# Patient Record
Sex: FEMALE | NOT HISPANIC OR LATINO | Employment: OTHER | URBAN - METROPOLITAN AREA
[De-identification: names, ages, dates, MRNs, and addresses within clinical notes are randomized per-mention and may not be internally consistent; named-entity substitution may affect disease eponyms.]

---

## 2022-02-01 ENCOUNTER — TRANSFERRED RECORDS (OUTPATIENT)
Dept: HEALTH INFORMATION MANAGEMENT | Facility: CLINIC | Age: 22
End: 2022-02-01

## 2022-03-01 ENCOUNTER — MEDICAL CORRESPONDENCE (OUTPATIENT)
Dept: HEALTH INFORMATION MANAGEMENT | Facility: CLINIC | Age: 22
End: 2022-03-01

## 2022-03-01 ENCOUNTER — TRANSFERRED RECORDS (OUTPATIENT)
Dept: HEALTH INFORMATION MANAGEMENT | Facility: CLINIC | Age: 22
End: 2022-03-01

## 2022-04-06 ENCOUNTER — TRANSFERRED RECORDS (OUTPATIENT)
Dept: HEALTH INFORMATION MANAGEMENT | Facility: CLINIC | Age: 22
End: 2022-04-06

## 2022-05-03 ENCOUNTER — TRANSFERRED RECORDS (OUTPATIENT)
Dept: HEALTH INFORMATION MANAGEMENT | Facility: CLINIC | Age: 22
End: 2022-05-03

## 2022-05-29 ENCOUNTER — TRANSFERRED RECORDS (OUTPATIENT)
Dept: HEALTH INFORMATION MANAGEMENT | Facility: CLINIC | Age: 22
End: 2022-05-29

## 2023-06-05 ENCOUNTER — HOSPITAL ENCOUNTER (OUTPATIENT)
Facility: CLINIC | Age: 23
Discharge: HOME | End: 2023-06-05
Attending: OBSTETRICS & GYNECOLOGY | Admitting: OBSTETRICS & GYNECOLOGY
Payer: MEDICAID

## 2023-06-05 ENCOUNTER — HOSPITAL ENCOUNTER (OUTPATIENT)
Facility: CLINIC | Age: 23
End: 2023-06-05
Attending: OBSTETRICS & GYNECOLOGY | Admitting: OBSTETRICS & GYNECOLOGY
Payer: MEDICAID

## 2023-06-05 VITALS
RESPIRATION RATE: 18 BRPM | OXYGEN SATURATION: 99 % | HEART RATE: 87 BPM | DIASTOLIC BLOOD PRESSURE: 55 MMHG | TEMPERATURE: 98.4 F | SYSTOLIC BLOOD PRESSURE: 104 MMHG

## 2023-06-05 DIAGNOSIS — Z34.90 INTRAUTERINE PREGNANCY: Primary | ICD-10-CM

## 2023-06-05 PROBLEM — N93.9 VAGINAL BLEEDING: Status: ACTIVE | Noted: 2023-06-05

## 2023-06-05 LAB
ABO/RH(D): NORMAL
ALBUMIN UR-MCNC: 20 MG/DL
ANTIBODY SCREEN: NEGATIVE
APPEARANCE UR: CLEAR
APTT PPP: 27 SECONDS (ref 22–38)
BILIRUB UR QL STRIP: NEGATIVE
CLUE CELLS: ABNORMAL
COLOR UR AUTO: YELLOW
ERYTHROCYTE [DISTWIDTH] IN BLOOD BY AUTOMATED COUNT: 13.8 % (ref 10–15)
FIBRINOGEN PPP-MCNC: 417 MG/DL (ref 170–490)
GLUCOSE UR STRIP-MCNC: NEGATIVE MG/DL
HCT VFR BLD AUTO: 29 % (ref 35–47)
HGB BLD-MCNC: 9.9 G/DL (ref 11.7–15.7)
HGB UR QL STRIP: NEGATIVE
HOLD SPECIMEN: NORMAL
INR PPP: 0.99 (ref 0.85–1.15)
KETONES UR STRIP-MCNC: ABNORMAL MG/DL
LEUKOCYTE ESTERASE UR QL STRIP: ABNORMAL
MCH RBC QN AUTO: 30.7 PG (ref 26.5–33)
MCHC RBC AUTO-ENTMCNC: 34.1 G/DL (ref 31.5–36.5)
MCV RBC AUTO: 90 FL (ref 78–100)
MUCOUS THREADS #/AREA URNS LPF: PRESENT /LPF
NITRATE UR QL: NEGATIVE
PH UR STRIP: 6.5 [PH] (ref 5–7)
PLATELET # BLD AUTO: 222 10E3/UL (ref 150–450)
RBC # BLD AUTO: 3.22 10E6/UL (ref 3.8–5.2)
RBC URINE: 1 /HPF
SP GR UR STRIP: 1.03 (ref 1–1.03)
SPECIMEN EXPIRATION DATE: NORMAL
SQUAMOUS EPITHELIAL: 1 /HPF
TRICHOMONAS, WET PREP: ABNORMAL
UROBILINOGEN UR STRIP-MCNC: 2 MG/DL
WBC # BLD AUTO: 8.2 10E3/UL (ref 4–11)
WBC URINE: 8 /HPF
WBC'S/HIGH POWER FIELD, WET PREP: ABNORMAL
YEAST, WET PREP: ABNORMAL

## 2023-06-05 PROCEDURE — 86780 TREPONEMA PALLIDUM: CPT

## 2023-06-05 PROCEDURE — 999N000104 HC STATISTIC NO CHARGE

## 2023-06-05 PROCEDURE — 85027 COMPLETE CBC AUTOMATED: CPT

## 2023-06-05 PROCEDURE — 36415 COLL VENOUS BLD VENIPUNCTURE: CPT

## 2023-06-05 PROCEDURE — 87086 URINE CULTURE/COLONY COUNT: CPT

## 2023-06-05 PROCEDURE — 81001 URINALYSIS AUTO W/SCOPE: CPT

## 2023-06-05 PROCEDURE — 87491 CHLMYD TRACH DNA AMP PROBE: CPT

## 2023-06-05 PROCEDURE — G0463 HOSPITAL OUTPT CLINIC VISIT: HCPCS

## 2023-06-05 PROCEDURE — 86901 BLOOD TYPING SEROLOGIC RH(D): CPT

## 2023-06-05 PROCEDURE — 85384 FIBRINOGEN ACTIVITY: CPT

## 2023-06-05 PROCEDURE — 86803 HEPATITIS C AB TEST: CPT

## 2023-06-05 PROCEDURE — 86762 RUBELLA ANTIBODY: CPT

## 2023-06-05 PROCEDURE — 87389 HIV-1 AG W/HIV-1&-2 AB AG IA: CPT

## 2023-06-05 PROCEDURE — 87210 SMEAR WET MOUNT SALINE/INK: CPT

## 2023-06-05 PROCEDURE — 87653 STREP B DNA AMP PROBE: CPT

## 2023-06-05 PROCEDURE — 86706 HEP B SURFACE ANTIBODY: CPT

## 2023-06-05 PROCEDURE — 85460 HEMOGLOBIN FETAL: CPT

## 2023-06-05 PROCEDURE — 85610 PROTHROMBIN TIME: CPT

## 2023-06-05 PROCEDURE — 85730 THROMBOPLASTIN TIME PARTIAL: CPT

## 2023-06-05 PROCEDURE — 87591 N.GONORRHOEAE DNA AMP PROB: CPT

## 2023-06-05 PROCEDURE — 87340 HEPATITIS B SURFACE AG IA: CPT

## 2023-06-05 RX ORDER — PROCHLORPERAZINE MALEATE 10 MG
10 TABLET ORAL EVERY 6 HOURS PRN
Status: DISCONTINUED | OUTPATIENT
Start: 2023-06-05 | End: 2023-06-06 | Stop reason: HOSPADM

## 2023-06-05 RX ORDER — ONDANSETRON 4 MG/1
4 TABLET, ORALLY DISINTEGRATING ORAL EVERY 6 HOURS PRN
Status: DISCONTINUED | OUTPATIENT
Start: 2023-06-05 | End: 2023-06-06 | Stop reason: HOSPADM

## 2023-06-05 RX ORDER — PROCHLORPERAZINE 25 MG
25 SUPPOSITORY, RECTAL RECTAL EVERY 12 HOURS PRN
Status: DISCONTINUED | OUTPATIENT
Start: 2023-06-05 | End: 2023-06-06 | Stop reason: HOSPADM

## 2023-06-05 RX ORDER — METOCLOPRAMIDE 10 MG/1
10 TABLET ORAL EVERY 6 HOURS PRN
Status: DISCONTINUED | OUTPATIENT
Start: 2023-06-05 | End: 2023-06-06 | Stop reason: HOSPADM

## 2023-06-05 RX ORDER — ACETAMINOPHEN 325 MG/1
650 TABLET ORAL EVERY 4 HOURS PRN
Status: DISCONTINUED | OUTPATIENT
Start: 2023-06-05 | End: 2023-06-06 | Stop reason: HOSPADM

## 2023-06-05 RX ORDER — PRENATAL VIT/IRON FUM/FOLIC AC 27MG-0.8MG
1 TABLET ORAL DAILY
Qty: 90 TABLET | Refills: 3 | Status: ON HOLD | OUTPATIENT
Start: 2023-06-05 | End: 2023-10-02

## 2023-06-05 RX ORDER — ONDANSETRON 2 MG/ML
4 INJECTION INTRAMUSCULAR; INTRAVENOUS EVERY 6 HOURS PRN
Status: DISCONTINUED | OUTPATIENT
Start: 2023-06-05 | End: 2023-06-06 | Stop reason: HOSPADM

## 2023-06-05 RX ORDER — FERROUS SULFATE 325(65) MG
325 TABLET, DELAYED RELEASE (ENTERIC COATED) ORAL EVERY OTHER DAY
Qty: 90 TABLET | Refills: 3 | Status: ON HOLD | OUTPATIENT
Start: 2023-06-05 | End: 2023-10-02

## 2023-06-05 RX ORDER — ACETAMINOPHEN 325 MG/1
325-650 TABLET ORAL EVERY 6 HOURS PRN
Qty: 90 TABLET | Refills: 3 | Status: SHIPPED | OUTPATIENT
Start: 2023-06-05 | End: 2023-06-20

## 2023-06-05 RX ORDER — METOCLOPRAMIDE HYDROCHLORIDE 5 MG/ML
10 INJECTION INTRAMUSCULAR; INTRAVENOUS EVERY 6 HOURS PRN
Status: DISCONTINUED | OUTPATIENT
Start: 2023-06-05 | End: 2023-06-06 | Stop reason: HOSPADM

## 2023-06-05 ASSESSMENT — ACTIVITIES OF DAILY LIVING (ADL)
ADLS_ACUITY_SCORE: 18
ADLS_ACUITY_SCORE: 33

## 2023-06-06 ENCOUNTER — TELEPHONE (OUTPATIENT)
Dept: OBGYN | Facility: CLINIC | Age: 23
End: 2023-06-06
Payer: MEDICAID

## 2023-06-06 LAB
C TRACH DNA SPEC QL NAA+PROBE: NEGATIVE
FETAL RBC % LFV: 0 %
FETAL RBC (ML): 0 ML
GP B STREP DNA SPEC QL NAA+PROBE: NEGATIVE
HBV SURFACE AB SERPL IA-ACNC: >1000 M[IU]/ML
HBV SURFACE AB SERPL IA-ACNC: REACTIVE M[IU]/ML
HBV SURFACE AG SERPL QL IA: NONREACTIVE
HCV AB SERPL QL IA: NONREACTIVE
HIV 1+2 AB+HIV1 P24 AG SERPL QL IA: NONREACTIVE
IF INDICATED RECOMMENDED DOSE OF RH IMMUNE GLOBULIN UG: 300 UG
N GONORRHOEA DNA SPEC QL NAA+PROBE: NEGATIVE
RUBV IGG SERPL QL IA: 13 INDEX
RUBV IGG SERPL QL IA: POSITIVE
T PALLIDUM AB SER QL: NONREACTIVE

## 2023-06-06 NOTE — PLAN OF CARE
Data: [facility] at .   Reason for maternal/fetal assessment per patient is Bleeding and back pain. Patient is a . Prenatal record reviewed.      OB History    Para Term  AB Living   3 2 2 0 0 2   SAB IAB Ectopic Multiple Live Births   0 0 0 0 2      # Outcome Date GA Lbr Adrian/2nd Weight Sex Delivery Anes PTL Lv   3 Current            2 Term         CHAPARRITA   1 Term         CHAPARRITA      Medical History: History reviewed. No pertinent past medical history.. Gestational Age Unknown. VSS. Cervix: closed.  Fetal movement present. Patient denies cramping, vaginal discharge, pelvic pressure, UTI symptoms, GI problems, bloody show, edema, headache, visual disturbances, epigastric or URQ pain, abdominal pain, rupture of membranes. Support persons present.  Action: Verbal consent for EFM. Triage assessment completed.  Fetal assessment: Presumed adequate fetal oxygenation documented (see flow record). Patient instructed to report change in fetal movement, vaginal leaking of fluid or bleeding, abdominal pain, or any concerns related to the pregnancy to her nurse/physician.   Response: Dr. Lowry informed of patient arrival. Plan per provider is discharge home following normal labs resulted at this time and referral for patient to establish prenatal care and plan for full anatomy ultrasound. Prescription for iron and PNV sent to [facility] per provider. Patient verbalized understanding of education and verbalized agreement with plan. Discharged ambulatory at 2335.

## 2023-06-06 NOTE — PROGRESS NOTES
L&D Triage Progress Note    HPI: Cathleen Urbano is a 23 year old  at 23w5d by BSUS, here for vaginal bleeding and back pain.    She reports walking earlier today and noticed very small amount of vaginal bleeding. She no longer has the bleeding. Denies prior issues with this. Also had some back pain earlier today. Using heat packs for this. Has not tried any medications. Eating and drinking well. Denies contractions or LOF. Denies any trauma.     Feeling baby move well yesterday but not as much today.    Recently moved from [place]. Moving to [place]. Currently living with her  and brother in law in [place]. States she had one US done in [place] but doesn't have access to her records. Thinks she's about 6 months along based on a timeframe she was told when she was about three months along. Does not remember her due date or how it was assigned.    Not taking any medications. Reports two prior vaginal deliveries without complication. Denies any PMH.     Pregnancy notable for:  - Limited PNC    No results found for: ABO, RH, AS, HEPBANG, CHPCRT, GCPCRT, TREPAB, RPR, RUBELLAABIGG, HGB, HIV    GBS Status:   No results found for: GBS          OBHX:  OB History    Para Term  AB Living   3 2 2 0 0 2   SAB IAB Ectopic Multiple Live Births   0 0 0 0 2      # Outcome Date GA Lbr Adrian/2nd Weight Sex Delivery Anes PTL Lv   3 Current            2 Term         CHAPARRITA   1 Term         CHAPARRITA       MedicalHX:  History reviewed. No pertinent past medical history.    SurgicalHX:  History reviewed. No pertinent surgical history.    Medications:  No current facility-administered medications on file prior to encounter.  No current outpatient medications on file prior to encounter.      Allergies:  No Known Allergies    FamilyHX:    History reviewed. No pertinent family history.    SocialHX:  Social History     Socioeconomic History     Marital status:      Spouse name: None     Number of children: None      Years of education: None     Highest education level: None   Tobacco Use     Smoking status: Never     Smokeless tobacco: Never   Substance and Sexual Activity     Alcohol use: Never     Drug use: Never       ROS: 10-point ROS negative except as in HPI.    Physical Exam:  Vitals:    23   BP: 101/61   Pulse: 87   Resp: 16   Temp: 98.6  F (37  C)   TempSrc: Oral   SpO2: 99%     GEN: resting comfortably in bed, NAD   CV: Regular rate, well perfused  PULM: On room air, no increased work of breathing  ABD: soft, gravid, non-tender, non-distended  Back: Nontender to palpation, no CVA tenderness  EXT: No edema  SSE: Normal appearing external female genitalia. Normal appearing vaginal epithelium and cervix with ectropion but no other lesions. No apparent bleeding from os or evidence of trauma. No pooling. Minimal amounts of white discharge throughout the vaginal vault.   CVX: C/L/H  Presentation: Cephalic by BSUS    BSUS:   Cephalic presentation. Posterior placenta without evidence of previa. Subjectively normal amount of amniotic fluid. Biometry with BPD 6.08 cm, AC 18.87 cm, FL 4.07 cm with average gestational age of 23w5d.     NST:  FHT: baseline 140, mod variability, intermittent variable decels  TOCO: quiet    A/P: 23 year old  at 23w5d by BSUS, here for vaginal bleeding and back pain. Pregnancy notable for limited PNC.    # Vaginal bleeding  Differential includes: Labor (cervical change), Placental Abruption, Trauma/Ectropion, Cervicitis, Placenta/Vasa Previa. No evidence of previa on BSUS but comprehensive ultrasound recommended for complete evaluation. Cervix nonlabored, so PTL unlikely. Given no abdominal pain or trauma, unlikely to be due to abruption but laboratory workup ordered for completeness. Infectious labs for cervicitis collected. No evidence of trauma. Most likely due to ectropion. Continue to monitor and return precautions provided. Follow up labs and treat PRN.   - Cervix: C/L/H  -  Membranes: Intact  - Labs: UA reflex UC, GC/C, Wet Prep, GBS, coags, KB  - Encourage oral hydration    # Anemia  - Hgb 9.9, no baseline available  - Recommend iron supplementation for now  - Further workup deferred to outpatient setting    # Back pain  No signs of PTL currently and patient states it does not feel like contractions. No CVA tenderness and pain mainly in lower back.  - Recommend symptomatic relief with hot packs and tylenol PRN  - Consider PT referral in future if persistent    # Fetal Well-Being  - NST reassuring, appropriate for gestational age  - Fetal movements seen on BSUS    # PNC  - Rh positive, GBS collected  - Imaging as above on BSUS  - PNL collected today  - Recommend PNV, prescription sent in    Dispo: To home w/ strict return precautions. Follow up recommended and message sent to RN pool for scheduling as well as comprehensive US.    Patient discussed with Dr. Valentine.    Cuate Lowry MD, MPH  Ob/Gyn Resident, PGY-2  06/05/23 11:47 PM     The patient was reviewed with Dr. Lowry.  I agree with the above assessment and plan of care.     Dalia Valentine MD, FACOG

## 2023-06-06 NOTE — DISCHARGE INSTRUCTIONS
Discharge Instruction for Undelivered Patients      You were seen for: Bleeding Assessment and Fetal Assessment  We Consulted: Dr. Lowry   You had (Test or Medicine):EFM, Labs for CBC, Hep B, Hep C, HIV, GC/Chlam, wet prep, GBS, Urinalysis and urine culture     Diet:   Drink 8 to 12 glasses of liquids (milk, juice, water) every day.  You may eat meals and snacks.     Activity:  Call your doctor or nurse midwife if your baby is moving less than usual.     Call your provider if you notice:  Swelling in your face or increased swelling in your hands or legs.  Headaches that are not relieved by Tylenol (acetaminophen).  Changes in your vision (blurring: seeing spots or stars.)  Nausea (sick to your stomach) and vomiting (throwing up).   Weight gain of 5 pounds or more per week.  Heartburn that doesn't go away.  Signs of bladder infection: pain when you urinate (use the toilet), need to go more often and more urgently.  The bag of cotto (rupture of membranes) breaks, or you notice leaking in your underwear.  Bright red blood in your underwear.  Abdominal (lower belly) or stomach pain.  Second (plus) baby: Contractions (tightening) less than 10 minutes apart and getting stronger.  *If less than 34 weeks: Contractions (tightening) more than 6 times in one hour.  Increase or change in vaginal discharge (note the color and amount)      Follow-up:  Establish care for regular OB visits with [facility] at [phone]             Detail Level: Detailed

## 2023-06-07 ENCOUNTER — TELEPHONE (OUTPATIENT)
Dept: OBGYN | Facility: CLINIC | Age: 23
End: 2023-06-07
Payer: MEDICAID

## 2023-06-07 DIAGNOSIS — Z34.90 INTRAUTERINE PREGNANCY: Primary | ICD-10-CM

## 2023-06-07 DIAGNOSIS — Z32.01 PREGNANCY TEST POSITIVE: ICD-10-CM

## 2023-06-07 LAB — BACTERIA UR CULT: NORMAL

## 2023-06-07 NOTE — TELEPHONE ENCOUNTER
[facility] Call Center    Phone Message    May a detailed message be left on voicemail: yes     Reason for Call: Order(s): Other:   Reason for requested: initial OB US ( about 6-7 months) per spouse  Date needed: 6/12/23  Provider name: Tova Lagos CNM      Action Taken: Other: whs    Travel Screening: Not Applicable

## 2023-06-12 ENCOUNTER — ANCILLARY PROCEDURE (OUTPATIENT)
Dept: ULTRASOUND IMAGING | Facility: CLINIC | Age: 23
End: 2023-06-12
Attending: ADVANCED PRACTICE MIDWIFE
Payer: MEDICAID

## 2023-06-12 DIAGNOSIS — Z32.01 PREGNANCY TEST POSITIVE: ICD-10-CM

## 2023-06-12 PROCEDURE — 76816 OB US FOLLOW-UP PER FETUS: CPT | Mod: 26 | Performed by: STUDENT IN AN ORGANIZED HEALTH CARE EDUCATION/TRAINING PROGRAM

## 2023-06-12 PROCEDURE — 76816 OB US FOLLOW-UP PER FETUS: CPT

## 2023-06-20 ENCOUNTER — PRENATAL OFFICE VISIT (OUTPATIENT)
Dept: OBGYN | Facility: CLINIC | Age: 23
End: 2023-06-20
Payer: MEDICAID

## 2023-06-20 VITALS — SYSTOLIC BLOOD PRESSURE: 97 MMHG | DIASTOLIC BLOOD PRESSURE: 63 MMHG | HEART RATE: 93 BPM | WEIGHT: 137 LBS

## 2023-06-20 DIAGNOSIS — Z34.90 INTRAUTERINE PREGNANCY: Primary | ICD-10-CM

## 2023-06-20 NOTE — PATIENT INSTRUCTIONS
Vitamin B6 (pyridoxine) Oral Tablet  Uses  This medicine is used for the following purposes:    metabolism disorder    nausea and vomiting    vitamin deficiency  Instructions  This medicine may be taken with or without food.  Store at room temperature away from heat, light, and moisture. Do not keep in the bathroom.  If you forget to take a dose on time, take it as soon as you remember. If it is almost time for the next dose, do not take the missed dose. Return to your normal dosing schedule. Do not take 2 doses of this medicine at one time.  Drug interactions can change how medicines work or increase risk for side effects. Tell your health care providers about all medicines taken. Include prescription and over-the-counter medicines, vitamins, and herbal medicines. Speak with your doctor or pharmacist before starting or stopping any medicine.  Speak with your doctor or pharmacist before starting any other vitamins.  It is very important that you follow your doctor's instructions for all blood tests.  Cautions  Tell your doctor and pharmacist if you ever had an allergic reaction to a medicine.  Do not use the medication any more than instructed.  Tell the doctor or pharmacist if you are pregnant, planning to be pregnant, or breastfeeding.  Side Effects  If you have any of the following side effects, you may be getting too much medicine. Please contact your doctor to let them know about these side effects.    drowsiness or sedation    numbness or tingling in hands and feet    headaches    nausea    stomach upset or abdominal pain  This medicine usually has no side effects.  A few people may have an allergic reaction to this medicine. Symptoms can include difficulty breathing, skin rash, itching, swelling, or severe dizziness. If you notice any of these symptoms, seek medical help quickly.  Extra  Please speak with your doctor, nurse, or pharmacist if you have any questions about this  medicine.  [email or web address]  IMPORTANT NOTE: This document tells you briefly how to take your medicine, but it does not tell you all there is to know about it. Your doctor or pharmacist may give you other documents about your medicine. Please talk to them if you have any questions. Always follow their advice. There is a more complete description of this medicine available in English. Scan this code on your smartphone or tablet or use the web address below. You can also ask your pharmacist for a printout. If you have any questions, please ask your pharmacist. The display and use of this drug information is subject to Terms of Use. Copyright(c) 2022 [employer]     9542-7528 [employer]. All rights reserved. This information is not intended as a substitute for professional medical care. Always follow your healthcare professional's instructions.          Pregnancy: Common Questions  There are plenty of myths and  old wives  tales  about pregnancy. You may need help  fact from fiction. On this sheet, you ll find answers to a few common questions. If you have other questions, talk with your healthcare provider.    Will working harm my baby?  In most cases, working throughout your pregnancy is not harmful at all. There may be concerns if the job involves dangerous machinery or chemicals, lifting, or standing for very long periods of time. Talk with your healthcare provider and employer about your particular job and pregnancy.  Is it safe to have sex during pregnancy?  Yes, unless you are specifically advised not to by your healthcare provider.  Why can t I change the cat litter box?  Cats carry a disease called toxoplasmosis. In adult humans, it shows up as a mild infection of the blood and organs. If you are infected during pregnancy, the baby s brain and eyes could be damaged. To be safe, have someone else change the litter. If you must handle it, wear a paper  mask over your nose and mouth. Also, wear gloves and wash your hands afterward.  Which medicines are safe?  No prescription or over-the-counter medicine is safe for everyone all of the time. But sometimes medicines are needed. Be sure your healthcare provider knows you are pregnant. Then use only the medicines he or she advises you to take.  Is it true that I can overheat my baby?  Yes. To prevent making your baby too warm:    Don t sit in a Jacuzzi. A long, warm bath is fine, but not in water over 100 F (37.7 C).    Exercise less intensely if you feel tired. Base your workout on how you feel, not your heart rate. Heart rates aren t a good way to measure effort during pregnancy.  Can I lift and carry safely?  Yes, if your healthcare provider doesn t tell you otherwise. Learn to lift and carry safely to prevent injury and reduce back pain during pregnancy. To protect your back:    Bend at the knees to bring the load nearer.    Get a good . Test the weight of the load.    Tighten your belly. Exhale as you lift.    Lift with your legs, not with your back.    Carry the load close to your body.    Hold the load so you can see where you are going.  What if I get sick?  Most women get sick at least once during pregnancy. Talk with your healthcare provider if you do. Most likely it will not affect your pregnancy. Get plenty of rest and fluids, and eat what you can. Talk with your provider before taking any medicines.  [employer] last reviewed this educational content on 8/1/2020 2000-2022 [employer]. All rights reserved. This information is not intended as a substitute for professional medical care. Always follow your healthcare professional's instructions.          Comfort Tips During Pregnancy  Pregnancy can bring discomfort of different kinds. Below are tips for ways to feel better. Talk with your healthcare provider before using pain-relieving medicine at any time during your pregnancy.    First  trimester tips  Easing nausea    Get up slowly. Eat a few unsalted crackers before you get out of bed.    Avoid smells that bother you.    Eat small, bland, low-fat, high-protein meals at frequent intervals.    Sip on water, weak tea, or clear soft drinks, like ginger ale. Eat ice chips.    Try taking vitamin B6.  Coping with fatigue    Take catnaps when you can.    Get regular exercise.    Accept help from others.    Practice good sleep habits, like going to bed and getting up at the same time each day. Use your bed only for sleep and sex.  Calming mood swings    Talk about your feelings with others, including other mothers.    Limit sugar, chocolate, and caffeine.    Eat a healthy diet. Don t skip meals.    Get regular exercise.  Soothing headaches    Get fresh air and exercise.    Relax and get enough rest.    Check with your healthcare provider before taking any pain medicines.    Second trimester tips    To limit ankle swelling, sit with your feet raised or wear support hose.    If you have pain in your groin and stomach (round ligament pain), don't make sudden twisting movements with your body.    For leg cramps, flexing your foot often brings immediate relief. Also try massaging your calf in long, downward strokes, or stretching your legs before going to bed. Get enough exercise and wear shoes with flexible soles. Eat foods rich in calcium.    Third trimester tips  Reducing heartburn    Eat small, light meals throughout the day rather than 3 large ones.    Sleep with your upper body raised 6 inches. Don t lie down until 2 hours after you eat.    Don't eat greasy, fried, or spicy foods.    Don't have citrus fruits or juices.  Treating constipation    Eat foods high in fiber, such as whole-grain foods, and fresh fruit and vegetables).    Drink plenty of water.    Get regular exercise.    Ask about your healthcare provider about medicines that have docusate or psyllium.  Taking care of your breasts    Don't use  harsh soaps or alcohol, which can make your skin too dry.    Wear nursing bras. They provide more support than regular bras and can be used after pregnancy if you breastfeed.  Getting a good night s sleep    Take a warm shower before bed.    Sleep on a firm mattress.    Lie on your side with 1 leg crossed over the other.    Use pillows to support your arms, legs, and belly.    [employer] last reviewed this educational content on 8/1/2020 2000-2022 [employer]. All rights reserved. This information is not intended as a substitute for professional medical care. Always follow your healthcare professional's instructions.          Folic Acid Supplements  Folic acid is also called folate. It is one of the B vitamins. Nutrition experts are just starting to learn more about how folic acid helps the body. It is needed to prevent a shortage of red blood cells (a type of anemia). Experts have also found that folic acid can prevent some birth defects.     How much folic acid do you need?  Adults should have 400 mcg (micrograms) of folic acid each day. Adults may need more if they are planning to get pregnant, are pregnant, or are breastfeeding. Talk with your healthcare provider to find the right amount of folic acid for you.   Why use a supplement?  Taking folic acid both before and during pregnancy is important. This can prevent birth defects of the spine and brain (neural tube defects). A supplement may also be helpful if you drink alcohol often. You may want to use a folic acid supplement if any of the following is true for you:   ? I am a person of childbearing age.   ? I am planning to get pregnant.  ? I rarely eat leafy green vegetables, dried beans, or lentils.   ? I rarely eat cereal and whole grains (wheat germ, brown rice, whole-wheat bread).  ? I often have more than 1 drink of alcohol a day.   If you take folic acid  Here are some tips to help you get the most from a folic acid supplement:    Read  the label to be sure the product won't  soon.    Choose a supplement that provides 400 to 800 mcg of folic acid.    Store the supplement in a cool, dry place, away from sun and heat.    Eat a healthy diet to get all the nutrients your body needs.  Foods that have folic acid  Folic acid is found mainly in plants. Some good sources include:    Dark green leafy vegetables such as spinach, kale, collards, and braeden lettuce    Lentils, chickpeas, and dried beans such as koenig, kidney, and black beans    Asparagus, bok keyona, broad-beans, broccoli, and Martell sprouts    Avocados, oranges, and orange juice    Wheat germ and whole-wheat products    Products fortified with folic acid, such as cereal, pasta, bread, and rice  [employer] last reviewed this educational content on 2022-2022 [employer]. All rights reserved. This information is not intended as a substitute for professional medical care. Always follow your healthcare professional's instructions.          Anemia During Pregnancy  Anemia is a condition in which the red blood cell count is too low. In pregnant women, this is often caused by not having enough iron in the blood. Anemia is common in pregnancy and very easy to treat.   Why you need iron   While pregnant, your body uses iron to make red blood cells for you and your baby. These cells bring oxygen to your baby and to the rest of your body. Not having enough red blood cells can cause your baby to be born too small. But this is rare, as it s easy for you to get enough iron.   Testing for anemia   The only way to know if you have anemia is to have a simple test called a CBC (complete blood count). This is a routine test that will be done at one of your first prenatal visits. This test may be done again, at about week 26 to week 28.   Treating anemia   If you have anemia due to low iron content, follow the advice of your healthcare provider. Eating foods high in iron and taking  supplements can help you get the iron you need.   Eating foods high in iron      Green leafy vegetables and nuts are a good source of iron.     Eat foods that are high in iron such as:     Red meat (limit organ meats such as liver)    Seafood (be sure it s fully cooked), and don't eat fish that are high in mercury, such as swordfish, tilefish, annie mackerel, and shark    Tofu    Eggs    Green, leafy vegetables    Whole grains and iron-fortified cereals    Dried fruits and nuts  Taking iron supplements   In most cases, a prenatal vitamin can provide enough iron. But if you need more, your healthcare provider may prescribe an iron supplement. Swallow iron pills with a glass of orange or cranberry juice. The vitamin C in these fruit juices can help your body absorb iron. But don t take your iron pills with juices that have calcium added to them. They can keep your body from absorbing the iron.   Iron supplements   Iron supplements may have certain side effects. They may cause your stools to turn black, and make you feel sick to your stomach or constipated. Here are some tips that may help you limit side effects:     Start slowly. Take 1 pill a day for a few days. Then work up to your prescribed dose over time.    Take your pills with meals, and not at bedtime.    Increase the fiber in your diet. Eat more whole grains, fruits, and vegetables.    Do mild exercise each day.    If advised by your healthcare provider, take a stool softener.  [employer] last reviewed this educational content on 2/1/2020 2000-2022 [employer]. All rights reserved. This information is not intended as a substitute for professional medical care. Always follow your healthcare professional's instructions.          Rh-Negative Screening  If you have Rh-negative blood, your baby may be at risk for health problems. This is true only if your baby has Rh-positive blood. A simple test followed by treatment can help prevent problems.    What are the risks?  If the blood of your baby is Rh positive, your Rh-negative blood may form antibodies. These antibodies will attack the Rh-positive blood. This is called Rh disease. Rh disease can cause your baby to lose blood cells or have other health problems. Medical treatment can prevent Rh disease by keeping antibodies from forming.   How are you tested?  A simple blood test shows if you re Rh negative. This test is done very early in your pregnancy. If you re Rh negative, you ll have a second blood test near week 28 of pregnancy. This test will check whether or not your blood contains Rh antibodies.     [employer] last reviewed this educational content on 4/1/2020 2000-2022 [employer]. All rights reserved. This information is not intended as a substitute for professional medical care. Always follow your healthcare professional's instructions.          What Is Prenatal Care?  Before getting pregnant, you may have added some good health habits to get ready for your baby. But if you didn t, start today. One of the first steps is learning how to take care of yourself. See your healthcare provider as soon as you think you may be pregnant. Then continue prenatal care during your pregnancy.     Prenatal care helps you have a healthy baby   During prenatal care:    Your healthcare provider checks the health of your pregnancy. They'll calculate a due date. This gives an estimate of your baby's delivery. Many people give birth between 38 and 41 weeks of pregnancy. Your due date is found by counting 40 weeks from the first day of your last menstrual period.    Your pregnancy's progress is checked. This includes your baby s growth, fetal heart rate, changes in your weight and blood pressure, and your overall health and comfort.    Your provider may find new concerns and manage current ones before problems happen.    Your provider will check lab work through blood and urine.    Your provider will  talk about normal changes that happen during pregnancy. They'll also talk about changes that may not be normal. And they'll advise you about lifestyle changes.    Your provider will answer your questions. They'll also help you get ready for labor and delivery.  You're part of a team  When you re pregnant, you re part of a team. This team includes you, your baby, and your provider. It also may include a partner or a main support person. That could be a loved one, such as a spouse, a family member, or a friend. As you work to give your baby a healthy start, rely on your team members for support.   It s not too late to start good habits   What matters most is protecting your baby from this moment on. If you smoke, drink alcohol, or use illegal drugs, now's the time to stop. If you need help, talk with your healthcare provider.     Smoking increases the risk of losing your baby. Or of having a low-birth-weight baby. If you smoke, quit now.    Alcohol and drugs have been linked with many problems. These include miscarriage, birth defects, intellectual disability, and low birth weight. Stay away from alcohol and drugs.    Eat a healthy diet. This helps keep you and your baby strong and healthy. Follow your provider's instructions for nutrition. Also stay within the guidelines you're given for healthy weight gain.    Take 400 micrograms to 800 micrograms (400 mcg to 800 mcg or 0.4 mg to 0.8 mg) of folic acid every day. Take it for at least 1 month before getting pregnant. And keep taking it for the first trimester of your pregnancy. This is to lower your risk of some brain and spinal birth defects. You can get folic acid from some foods. But it's hard to get all the folic acid you'll need from foods alone. Talk with your provider about taking a folic acid supplement.    Regular exercise will help you stay fit and feel good during pregnancy. It can also help prevent or reduce back pain. Talk with your provider about how to  exercise safely during pregnancy.    If you have a health condition, make sure it's under control. Some conditions include asthma, diabetes, depression, high blood pressure, obesity, thyroid disease, or epilepsy. Be sure your vaccines are up to date.  How daily issues affect your health  Many things in your daily life impact your health. This can include transportation, money problems, housing, access to food, and . If you can t get to medical appointments, you may not receive the care you need. When money is tight, it may be difficult to pay for medicines. And living far from a grocery store can make it hard to buy healthy food.   If you have concerns in any of these or other areas, talk with your healthcare team. They may know of local resources to assist you. Or they may have a staff person who can help.   [email or web address] last reviewed this educational content on 8/1/2020 2000-2022 [employer]. All rights reserved. This information is not intended as a substitute for professional medical care. Always follow your healthcare professional's instructions.          Understanding Intimate Partner Violence  Intimate partner violence (IPV) affects hundreds of thousands of women. But the true number may be much higher because many women may not report it. Partner violence often starts or gets worse during and after pregnancy. This may be from the stress of pregnancy and a new baby. IPV can result in pregnancy complications, poor postpartum care, and poor health of the baby.  You may feel alone if you are experiencing intimate partner violence during or after your pregnancy, but know that you re not. It s far more common than you think. And there s help and hope. Talk with your healthcare provider if you are not safe.  Types of intimate partner violence  Most people think of IPV as just physical abuse. While it does often include physical abuse, IPV can be emotional and sexual abuse. These other forms  of abuse are sometimes harder to see. This is especially true for emotional abuse, because it can distort your own viewpoint.    Physical abuse includes using physical force to hurt or disable a partner. It can include throwing objects, pushing, kicking, biting, slapping, strangling, hitting, or beating.    Emotional abuse includes making threats, and controlling money or other resources. It s anything that erodes a person s sense of self-worth. It may look like name-calling, blaming, and stalking. It can also include isolation from friends, family, and even access to healthcare.    Sexual violence includes rape, unwanted kissing, and forced touching. It also includes reproductive coercion. This is holding power and control over the woman s reproductive health. It may look like efforts to sabotage contraception, having unsafe sex to purposefully expose the woman to sexually transmitted infections (STIs). It may also include forced  or injuries to cause a miscarriage.  Are you at risk for IPV?  IPV affects all genders, races, ethnicities, and social and economic statuses. But some people are at greater risk for being a victim or an abuser. Certain factors can increase the risk for IPV.  Individual factors    Having low self-esteem, being emotional dependent, insecure, and jealous    Having low income or being unemployed, having recent job loss or job instability    Being younger    Using alcohol or drugs    Being depressed, angry, having PTSD, or being hostile towards women    Having a history of abusing others, being abused, or witnessing abuse    Having poor problem solving skills    Having poor impulse control    Being a Native , , or  woman  Relationship factors    Marital or relationship conflict, frequent fighting    Having a jealous or possessive partner    Having control issues    Being under economic stress    Having poor social support    Having income  "inequality    It often helps to ask yourself these questions from the  Dim to know if you are in an abusive relationship:    Does my partner always put me down and make me feel bad about myself?    Has my partner caused harm or pain to my body?    Does my partner threaten me, the baby, my other children or himself?    Does my partner blame me for his actions? Does he tell me it's my own fault he hit me?    Is my partner becoming more violent as time goes on?    Has my partner promised never to hurt me again, but still does?  If you answered \"yes\" to any of these questions, you may be in an unhealthy relationship.  If you recognize yourself or your partner in any of these descriptors, talk with your healthcare provider to get help. If you are in danger, he or she can help you develop a safety plan.  Health effects  IPV during or after pregnancy can cause physical and emotional health effects, pregnancy complications, poor outcomes, and injury and harm to the baby after birth.  During pregnancy    Physical injuries such as bruises, cuts, or broken bones    Vaginal bleeding or pelvic pain    Early labor and delivery    Tearing of the placenta (placental abruption)    Maternal death  Infant health    Low infant birth weight    Infant who needs NICU care    Broken bones    Death of   After birth  After birth, the mother may:    Have pain and other long-term health conditions    Develop postpartum depression (2 to 3 times greater risk)    Have high-risk sexual behaviors    Use harmful substances    Have unhealthy diet and lifestyle such as eating disorders  Abuse is never OK. One in 6 abused women are first abused during pregnancy, when it s dangerous to both you and your developing baby. Recognizing that you are in an abusive relationship is the first step to help. See your healthcare provider or someone else you trust who can help you develop a safety plan.  What to expect from your healthcare " provider  It can be a hard to bring up partner violence with your healthcare provider. But it s an important first step in getting help. Rest assured, your conversation is confidential. He or she is a non-judgmental health professional. He or she likely has other patients with similar problems and recognizes the difficulty of the situation. Your provider may ask you questions such as:    Do you feel safe in your relationship?    Do you have a safe place to go in an emergency?    Do conflicts ever turn into physical fights?  Answer honestly and completely. There will be no pressure to disclose the abuse or to press charges. He or she won t ask about the abuse in front of a partner, friends, or family. The goal is to help you access help when you are ready.  Call   If you feel your safety is in jeopardy now, call or the police.  For more help  If the person who hurt you is your partner or spouse and your situation can become dangerous again, it's vital to make a safety plan. The National Domestic Violence Hotline can help you develop a plan that meets your personal situation.    National Domestic Violence Hotline , www.mobilePeople.Linq3, 231.677.7274  [employer] last reviewed this educational content on 5/1/2020 2000-2022 [employer]. All rights reserved. This information is not intended as a substitute for professional medical care. Always follow your healthcare professional's instructions.          Healthy Eating Habits During Pregnancy  It s important to develop healthy eating habits while you are pregnant, for you as well as for your baby. Here are some ways to stay healthy.   Aim for a healthy weight  A slow, steady rate of weight gain is often best. After the first trimester, you may gain about a pound a week. If you were overweight before pregnancy, you need to gain fewer pounds. Your healthcare provider can give you a healthy weight goal for your pregnancy.   Don t diet  Now is not the time to  diet. You may not get enough of the nutrients you and your baby need. Instead, learn how to be a healthy eater. Start by doing it for your baby. Soon, you may do it for yourself.   Vitamins and supplements  Talk with your healthcare provider about taking these and other prenatal vitamins and supplements.     Iron makes the extra blood you need now.    Calcium and vitamin D help build and keep strong bones.    Folic acid helps prevent certain birth defects.    Iodine helps the thyroid work right.    Some vitamins may not be safe to take. Your healthcare provider will tell you which ones to avoid.  Fluids    Drink at least 8 to 10 cups of fluid daily. Your baby needs fluids. Fluids also decrease constipation, flush out toxins and waste, limit swelling, and help prevent bladder infections. Water is best. Other good choices are:     Water or seltzer water with a slice of lemon or lime. (These can also help ease an upset stomach.)    Clear soups that are low in salt    Low-fat or fat-free milk, soy or rice milk with calcium added    Popsicles or gelatin  Things to avoid  Some things might harm your growing baby. Don t eat or drink:     Alcohol    Unpasteurized dairy foods and juices    Raw or undercooked meat, poultry, fish, or eggs    Unwashed fruits and vegetables    Prepared meats, like deli meats or hot dogs, unless heated until steaming hot    Fish that are high in mercury, like shark, swordfish, annie mackerel, tilefish, and albacore tuna  Things to limit  Ask your healthcare provider whether it s safe to eat or drink:     Caffeine    Artificial sweeteners    Organ meats    Certain types of fish    Fish and shellfish that contain mercury in lower amounts, like shrimp, canned light tuna, salmon, pollock, and catfish  [employer] last reviewed this educational content on 7/1/2021 2000-2022 [employer]. All rights reserved. This information is not intended as a substitute for professional medical care.  Always follow your healthcare professional's instructions.          Pregnancy: Planning Your Exercise Routine  While you re pregnant, an exercise routine helps both your mind and your body feel good. It tones your muscles and makes them stronger. It also gives you and your baby more oxygen.   The right exercise for you    Overall conditioning is best for you and your baby. Try walking, swimming, or riding a stationary bike. Always warm up, cool down, and drink enough fluids. Keep a snack close by in case your blood sugar gets low. Discuss exercise choices with your healthcare provider. Talk about the following:     If you already exercise, find out how to adapt your routine while you re pregnant. Keep the intensity of the exercise moderate. As your pregnancy progresses, your center of gravity will change. Be careful to keep your balance.    Ask if there are any local prenatal exercise classes, such as yoga or water aerobics. Find out which prenatal exercise videos are good choices.    If you were not exercising before your pregnancy, find out the best way to start. Now is not the time to begin a new workout on your own. Start slowly. Listen to your body.    Ask which forms of exercise you should avoid. These may include risky activities like hot yoga, horseback riding, scuba diving, skiing, skating, and contact sports.  Pelvic tilts  These help strengthen your stomach muscles and low back. You can do pelvic tilts instead of sit-ups.     Do this exercise on your hands and knees.    Relax the back of your neck. Pull your stomach in until your low back flattens.    Hold for 30 seconds. Release. Repeat 10 times. Do this twice a day.  Kegel exercises  Kegel exercises strengthen the pelvic muscles. Doing Kegels daily helps prepare these muscles for delivery. Kegels also help ease your recovery. You exercise these muscles by tightening, holding, then relaxing them. To do 1 type of Kegel exercise, contract as if you were  stopping your urine stream (but do it when you re not urinating). Hold for 10 seconds, then repeat 10 times, a few times a day.   Tips to add activity  Here are some tips to follow:    Park the car farther from a store and walk.    If you can, do errands on foot instead of driving.    Walk across the office to talk to someone in person instead of calling.    While waiting for appointments, go up and down stairs or around the block.  Tips to stay active  Here are some tips to follow:    Maintain your routine. But exercise less intensely if you feel tired.    Base your workout on how you feel, not your heart rate. Heart rates aren t a good way to measure effort during pregnancy.    Don't exercise on your back after week 16.  What are the warning signs that I should stop exercising?  Stop exercising and call your healthcare provider if you have any of these symptoms:    Vaginal bleeding     Dizziness or feeling faint     Increased shortness of breath     Chest pain     Headache     Muscle weakness     Calf (back of the leg) pain or swelling      Uterine contractions or  labor     Decreased fetal movement     Fluid leaking (or gushing) from your vagina  [employer] last reviewed this educational content on 2021-2022 [employer]. All rights reserved. This information is not intended as a substitute for professional medical care. Always follow your healthcare professional's instructions.          Nutrition During Pregnancy   Having a healthy baby depends mostly on you. What you eat matters to your baby and your health. During pregnancy, you will likely need about 300 more calories per day than before you became pregnant. Each day, try to eat the number of servings listed here for each food group. In addition, cut down on salt and caffeine. Limit the amount of sweets and high-fat foods you eat. Don t smoke or drink alcohol.     Important: See your healthcare provider as often as requested. If  you have any questions, be sure to ask them.   Fruits Vegetables Grains & Cereals* Fats & Oils   2 cups  Examples of 1-cup servings:   1 medium apple  1 medium orange  1 medium banana  1 cup chopped fruit  1 cup 100% fruit juice (pasteurized)   1/2 cup dried fruit 2-1/2 to 3 cups   Examples of 1 servin cups raw, leafy greens   1 cup raw or cooked cut-up vegetables   1 cup 100% vegetable juice (pasteurized)  6 to 8 ounces  Examples of 1-ounce servings:   1 slice bread  1/2 cup cooked rice  1/2 cup cooked cereal   1/2 cup pasta  1 ounce cold cereal 6 to 8 teaspoons   Dairy** Protein--- Fluids      3 cups  Examples of 1-cup servings:   1 cup milk  1 cup yogurt  1-1/2 ounces natural cheese   2 ounces processed cheese  5 to 6-1/2 ounces  Examples of 1-ounce servings:   1 egg  1 ounce of lean meat, poultry, or fish   1/4 cup cooked beans   1 tablespoon peanut butter   1/2 ounce nuts 8 or more 8-ounce glasses   Examples:  Water  Mineral water  Clear soups, broth      *Note: Choose whole grains whenever possible.   ** Note: Try to choose low-fat options; stay away from soft cheeses and unpasteurized milk.   --- Notes: Don't eat raw or undercooked meats, eggs, seafood, fish, and shellfish. Also, some types of fish, such as shark, swordfish, and annie mackerel, should not be eaten during pregnancy. Don't eat hot dogs, lunch meats, or cold cuts unless heated to steaming just before being served. Ask your healthcare provider about safe choices.   Prenatal supplements  A prenatal supplement is a pill that you take daily during pregnancy. It helps make sure you re getting the right amount of certain nutrients that are important to your baby. Ask your healthcare provider to help you choose the best one for you. Important nutrients during pregnancy include:     Folic acid. It's best to start taking this supplement 1 month before you start trying to get pregnant. Folic acid helps prevent certain problems in your baby. During  pregnancy, you need to take 400 micrograms (mcg) of folic acid every day for the first 2 to 3 months after conception. After that, 600 mcg is needed for a growing baby and placenta.    Iron, calcium, and vitamin D. You may also be advised to take these supplements during pregnancy. They help keep you and your baby healthy. Take them at different times because calcium makes it hard for the body to absorb iron. Taking iron with orange juice helps to increase its absorption.  [employer] last reviewed this educational content on 8/1/2020 2000-2022 [employer]. All rights reserved. This information is not intended as a substitute for professional medical care. Always follow your healthcare professional's instructions.          Pregnancy: Your Weight  Being a healthy weight is important for both you and your baby. The weight you gain now is not just extra fat. It is also the weight of your baby. And it is the increased blood and fluids to support the baby. A slow, steady rate of gain is best. How much you should gain depends on your weight before getting pregnant. Check with your healthcare provider to find out what is right for you.      Your weight will be checked regularly by your healthcare provider.     Talk to your healthcare provider if you have any questions.   If you gain too much  Gaining too much weight might cause you to feel tired, or you could have a harder pregnancy or birth. If you and your healthcare provider decide you re gaining too much:     Eat fewer fats and sugars. Instead, eat fruit, vegetables, and whole-grain foods.    Drink plenty of water between meals.    Get at least 20 minutes of light exercise, like walking, each day.    Don t diet. You might not get enough of the nutrients you and your baby need.    Keep a food diary to help you gauge what and how much you are eating.  If you're not gaining enough  If you don t gain enough, your baby could be too small or have health  problems. Women tend to gain most of their weight in the second and third trimesters. For now:     Eat many types of foods. Make sure you get enough calcium, protein, and carbohydrates.    Don t skip meals.    Eat healthy snacks.    Pick nutrient-dense, high-calorie healthy food like trail mix or protein shakes.    See a dietitian for help.    Talk to your healthcare provider if you have had an eating disorder or problems with certain foods.  The following are ways to get more calories:    Eat breakfast every day. Peanut butter or a slice of cheese on toast can give you an extra protein boost.    Snack between meals. Yogurt and dried fruits can provide protein, calcium, and minerals.    Try to eat more foods that are high in good fats, like nuts, fatty fish, avocados, and olive oil.    Drink juices made from real fruit that are high in vitamin C or beta-carotene, like grapefruit juice, orange juice, papaya nectar, apricot nectar, and carrot juice.    Don't eat junk food, like foods high in sugar.  [employer] last reviewed this educational content on 7/1/2021 2000-2022 [employer]. All rights reserved. This information is not intended as a substitute for professional medical care. Always follow your healthcare professional's instructions.        You have been provided the CDC Warning Signs in Pregnancy document.    Additional copies can be found here: www.fvfiles.[email or web address].pdf    Pregnancy: Your Second Trimester Changes  Each day, you and your baby are changing and growing together. Here s a quick look at what s happening to both of you.  How you are changing  Even when you don t notice it, your body is adapting to meet the needs of your growing baby. The changes in your body might also affect your moods.  Your body  Your uterus expands as your baby grows. As the weeks go by, you will feel more pressure on your bladder, stomach, and other organs. You may notice some skin color changes on your  forehead, nose, or cheeks. Freckles may darken, and moles may grow. You may notice a darker line on your abdomen between your belly button and pubic bone in the midline.  Your moods  The second trimester is often easier than the first. Still, be prepared for mood swings. These are from the increase in hormones made by your body. Hormones are chemicals that affect the way organs work. These mood swings are a normal part of pregnancy.  How your baby is growing    Month 4  Your baby s heartbeat may be heard with a Doppler (handheld ultrasound device) by 9 to 10 weeks. Eyebrows, eyelashes, and fingernails begin to form.    Month 5  You may feel your baby move. After a growth spurt, your baby nears 10 inches.    Month 6  Your baby s fingerprints have formed. Your baby weighs about 1 to 2 pounds and is about 12 inches long.    [employer] last reviewed this educational content on 7/1/2021 2000-2022 [employer]. All rights reserved. This information is not intended as a substitute for professional medical care. Always follow your healthcare professional's instructions.          Adapting to Pregnancy: Second Trimester  Keep up the healthy habits you started in your first trimester. You might be a little more tired than normal. So plan your day wisely. Look at the tips below and choose the ones that suit your lifestyle.   Note  If you have any questions, talk with your healthcare provider.   If you work  If you can, adjust your work with your employer to fit your needs. Try these tips:     If you stand for long periods, find ways to do some tasks while sitting. Also, try to stand with 1 foot resting on a low stool or ledge. Shift your weight from foot to foot often. Wear low-heeled shoes.    If you sit, keep your knees level with your hips. Rest your feet on a firm surface. Sit tall with support for your low back.    If you work long hours, ask about adjusting your schedule. Try taking shorter breaks more  often.  When you travel  The second trimester may be the best time for any travel. Talk to your healthcare provider about any special plans you may need to make. Always:     Wear a seat belt. Fasten the lap part under your belly. Wear the shoulder part also.    Take breaks often during long trips by car or plane. Move around to stretch your legs.    Drink plenty of fluids on flights. The air in plane cabins is very dry.    Stay out of hot climates or high altitudes if you are not used to them.    Stay away from places where the food and water might make you sick.    Make sure you are up-to-date on all vaccines, including the flu vaccine. This is especially important when traveling overseas.  Taking time to relax  Find time to rest and relax at work or at home:     Take short time-outs daily. Do relaxation exercises.    Breathe deeply during stressful times.    Try not to take on too much. Plan tasks for times when you have the most energy.    Take naps when you can. Or just sit and relax.    After week 16, don't lie on your back for more than a few minutes. Instead, lie on your side. Switch sides often.    Having sex  Unless your healthcare provider tells you otherwise, there is no reason to stop having sex now. Blood supply increases to the pelvic area in the second trimester. Because of this, sex might be more enjoyable. Try different positions and see what s best. Also talk with your partner about any changes in desire. Spotting may happen after sex. Let your healthcare provider know if there is heavy bleeding.   Keeping your environment safe  You can still clean your house and use scented products. Just take some simple precautions:     Wear gloves when using cleaning fluids.    Open windows to let in fresh air. Use a fan if you paint.    Stay away from secondhand smoke.    Don t breathe fumes from nail polish, hair spray, cleansers, or other chemicals.  How daily issues affect your health  Many things in your  daily life impact your health. This can include transportation, money problems, housing, access to food, and . If you can t get to medical appointments, you may not receive the care you need. When money is tight, it may be difficult to pay for medicines. And living far from a grocery store can make it hard to buy healthy food.   If you have concerns in any of these or other areas, talk with your healthcare team. They may know of local resources to assist you. Or they may have a staff person who can help.   [employer] last reviewed this educational content on 6/1/2021 2000-2022 [employer]. All rights reserved. This information is not intended as a substitute for professional medical care. Always follow your healthcare professional's instructions.          Understanding Round Ligament Pain in Pregnancy   Round ligament pain is a common problem in pregnancy. Ligaments are strong tissues that connect bones, muscles, and organs. There are 2 round ligaments. There is 1 on each side of the uterus. The top part of each ligament attaches to the upper side of the uterus. The bottom of each ligament attaches down in the pubic area. These ligaments help keep the uterus in place as you move around.     What causes round ligament pain in pregnancy?   As your uterus grows during pregnancy, the round ligaments are stretched and work harder when you move around. They may stretch too quickly when you stand up or bend or laugh. Nearby nerves may be irritated, or the ligaments may have a painful spasm.   Symptoms of ligament pain in pregnancy  The symptoms are sharp pains that last a few seconds. The pain may happen most often on the right side of the belly. It may happen in the hip, the lower belly, or even deep down in your pubic area. The pain may happen when you:     Move suddenly    Stand up    Walk    Roll over in bed    Laugh    Cough    Sneeze  Diagnosing round ligament pain in pregnancy   Your  healthcare provider will ask about your symptoms and give you a physical exam. He or she may give you tests to check for other problems that can cause pain, such as an ovarian cyst or enlarged vein (varicocele). He or she will also check for signs of  labor or other pregnancy problems.   Treatment for round ligament pain in pregnancy   To help prevent pain:    Move slowly when you stand up, roll over, turn, or bend.    Don t stand for long periods of time.    Don t lift heavy objects.    Do gentle daily stretches of your hip joints.  When to call your healthcare provider  Call your healthcare provider right away if you have any of these:     Fever of 100.4 F (38 C) or higher    Pains that last more than a few minutes    Pain that gets worse    Bleeding, nausea, vomiting, or other new symptoms  [employer] last reviewed this educational content on 3/1/2020    8620-9158 [employer]. All rights reserved. This information is not intended as a substitute for professional medical care. Always follow your healthcare professional's instructions.          Relieving Back Pain During Pregnancy: Wall Stretch, Body Bend   Before trying these exercises, talk to your healthcare provider to make sure they are safe for you. Ask your healthcare provider how many times to do each exercise.   Wall stretch  This strengthens and loosens the muscles in your upper back:   1. Lean against a wall with a firm pillow or rolled towel under your shoulder blades. Your feet should be about 12 inches from the wall and shoulder-width apart. Point your chin down.  2. Breathe in. Push your shoulders, neck, and head against the wall. You will feel a stretch in your shoulders.  3. Hold for 5 counts. Then breathe out, and relax your shoulders and neck.   Body bend  This strengthens your back and buttocks muscles:   1. Stand with your legs shoulder-width apart. Put your hands on your upper thighs and bend your knees slightly.  2. Slowly  bend forward at the hips. Push your hips back and keep your shoulders up. Make sure your back is straight. You ll feel a stretch in your upper thighs. You ll also feel your back muscles holding you in position.  3. Hold for 5 counts, then straighten.   [employer] last reviewed this educational content on 7/1/2021 2000-2022 [employer]. All rights reserved. This information is not intended as a substitute for professional medical care. Always follow your healthcare professional's instructions.          Pregnancy: Planning Your Exercise Routine  While you re pregnant, an exercise routine helps both your mind and your body feel good. It tones your muscles and makes them stronger. It also gives you and your baby more oxygen.   The right exercise for you    Overall conditioning is best for you and your baby. Try walking, swimming, or riding a stationary bike. Always warm up, cool down, and drink enough fluids. Keep a snack close by in case your blood sugar gets low. Discuss exercise choices with your healthcare provider. Talk about the following:     If you already exercise, find out how to adapt your routine while you re pregnant. Keep the intensity of the exercise moderate. As your pregnancy progresses, your center of gravity will change. Be careful to keep your balance.    Ask if there are any local prenatal exercise classes, such as yoga or water aerobics. Find out which prenatal exercise videos are good choices.    If you were not exercising before your pregnancy, find out the best way to start. Now is not the time to begin a new workout on your own. Start slowly. Listen to your body.    Ask which forms of exercise you should avoid. These may include risky activities like hot yoga, horseback riding, scuba diving, skiing, skating, and contact sports.  Pelvic tilts  These help strengthen your stomach muscles and low back. You can do pelvic tilts instead of sit-ups.     Do this exercise on your hands and  knees.    Relax the back of your neck. Pull your stomach in until your low back flattens.    Hold for 30 seconds. Release. Repeat 10 times. Do this twice a day.  Kegel exercises  Kegel exercises strengthen the pelvic muscles. Doing Kegels daily helps prepare these muscles for delivery. Kegels also help ease your recovery. You exercise these muscles by tightening, holding, then relaxing them. To do 1 type of Kegel exercise, contract as if you were stopping your urine stream (but do it when you re not urinating). Hold for 10 seconds, then repeat 10 times, a few times a day.   Tips to add activity  Here are some tips to follow:    Park the car farther from a store and walk.    If you can, do errands on foot instead of driving.    Walk across the office to talk to someone in person instead of calling.    While waiting for appointments, go up and down stairs or around the block.  Tips to stay active  Here are some tips to follow:    Maintain your routine. But exercise less intensely if you feel tired.    Base your workout on how you feel, not your heart rate. Heart rates aren t a good way to measure effort during pregnancy.    Don't exercise on your back after week 16.  What are the warning signs that I should stop exercising?  Stop exercising and call your healthcare provider if you have any of these symptoms:    Vaginal bleeding     Dizziness or feeling faint     Increased shortness of breath     Chest pain     Headache     Muscle weakness     Calf (back of the leg) pain or swelling      Uterine contractions or  labor     Decreased fetal movement     Fluid leaking (or gushing) from your vagina  [employer] last reviewed this educational content on 2021-2022 [employer]. All rights reserved. This information is not intended as a substitute for professional medical care. Always follow your healthcare professional's instructions.          Blood Glucose Screening During Pregnancy  Gestational  diabetes is diabetes that only pregnant women get. Changes in your body during pregnancy can cause high blood sugar (glucose). This can cause problems for you and your baby. It is a serious condition. But it can be controlled.     Your healthcare provider will talk with you about blood glucose screening.     Who is at risk for gestational diabetes?  You are at risk of getting gestational diabetes if any of the risk factors below apply to you. The risk for this condition gets higher as your number of risk factors increases:    You are , , , , or .    You weigh more than your healthcare provider says is healthy for you.    You have a relative with diabetes.    You are older than 25.    You had gestational diabetes during a past pregnancy.    You had a stillbirth or a very large baby before.    You have a history of abnormal glucose tolerance.    You have sugar in your urine at the first prenatal visit.    You have metabolic syndrome, PCOS (polycystic ovary syndrome), are using glucocorticoids, or have high blood pressure.    You are pregnant with twins or more  What happens during a screening?  Here is what to expect during a blood glucose screening:    There is conflicting advice for screening. But the American College of Obstetricians and Gynecologists currently advises that all pregnant women be screened for gestational diabetes. When you are screened depends on your risk. Women are tested at 24 to 28 weeks of pregnancy. Women at high risk may be tested when they first learn they are pregnant.    To do the screening, a blood sample is taken. Your blood sugar level is measured.    If the results show a high blood sugar level, a glucose tolerance test may be ordered. You will drink a certain amount of sugar. This test measures how long it takes for sugar to leave your blood. The test will show if you have gestational diabetes.  What to know if you test  positive  Here are some things you need to know:    Gestational diabetes can be treated. The best way to control it is to find out you have it early and start treatment quickly.    This condition can cause problems for the mother during pregnancy. It can also cause problems with the baby during pregnancy, delivery, and after. Treatment greatly lowers the chance for problems.    The changes in your body that cause gestational diabetes normally happen only when you are pregnant. After the baby is born, your body goes back to normal. The condition goes away. But you may be more likely to have type 2 diabetes later. Talk with your healthcare provider about ways to help prevent type 2 diabetes.  Treating gestational diabetes  Here is how to treat gestational diabetes:    You ll need to check your blood sugar often. You can do this at home. Prick your finger and check a drop of blood on a glucose monitor. Your healthcare provider will show you how and when to check your blood sugar. They will talk about your target blood sugar level.    To manage your blood sugar, you will be given a special plan. It will likely include meal planning and getting regular exercise. Some women need to take a hormone called insulin. Others may take medicine to help control their blood sugar.  [employer] last reviewed this educational content on 8/1/2020 2000-2022 [employer]. All rights reserved. This information is not intended as a substitute for professional medical care. Always follow your healthcare professional's instructions.          Tdap (Tetanus, Diphtheria, Pertussis) Vaccine: What You Need to Know    This is a Vaccine Information Statement from the CDC.   Many vaccine information statements are available in [place] and other languages. See www.immunize.org/vis   Hojas de información sobre vacunas están disponibles en español y en muchos otros idiomas. Visite www.immunize.org/vis   1. Why get vaccinated?   Tdap  vaccine can prevent tetanus, diphtheria, and pertussis.   Diphtheria and pertussis spread from person to person. Tetanus enters the body through cuts or wounds.     TETANUS (T) causes painful stiffening of the muscles. Tetanus can lead to serious health problems, including being unable to open the mouth, having trouble swallowing and breathing, or death.    DIPHTHERIA (D) can lead to difficulty breathing, heart failure, paralysis, or death.    PERTUSSIS (aP), also known as  whooping cough,  can cause uncontrollable, violent coughing that makes it hard to breathe, eat, or drink. Pertussis can be extremely serious especially in babies and young children, causing pneumonia, convulsions, brain damage, or death. In teens and adults, it can cause weight loss, loss of bladder control, passing out, and rib fractures from severe coughing.  2. Tdap vaccine   Tdap is only for children 7 years and older, adolescents, and adults.   Adolescents should receive a single dose of Tdap, preferably at age 11 or 12 years.   Pregnant people should get a dose of Tdap during every pregnancy, preferably during the early part of the third trimester, to help protect the  from pertussis. Infants are most at risk for severe, life-threatening complications from pertussis.   Adults who have never received Tdap should get a dose of Tdap.   Also, adults should receive a booster dose of either Tdap or Td (a different vaccine that protects against tetanus and diphtheria but not pertussis) every 10 years , or after 5 years in the case of a severe or dirty wound or burn.   Tdap may be given at the same time as other vaccines.   3. Talk with your health care provider  Tell your vaccination provider if the person getting the vaccine:     Has had an allergic reaction after a previous dose of any vaccine that protects against tetanus, diphtheria, or pertussis , or has any severe, life-threatening allergies    Has had a coma, decreased level of  consciousness, or prolonged seizures within 7 days after a previous dose of any pertussis vaccine (DTP, DTaP, or Tdap)    Has seizures or another nervous system problem    Has ever had Guillain-Barré Syndrome (also called  GBS )    Has had severe pain or swelling after a previous dose of any vaccine that protects against tetanus or diphtheria  In some cases, your health care provider may decide to postpone Tdap vaccination until a future visit.   People with minor illnesses, such as a cold, may be vaccinated. People who are moderately or severely ill should usually wait until they recover before getting Tdap vaccine.   Your health care provider can give you more information.  4. Risks of a vaccine reaction     Pain, redness, or swelling where the shot was given, mild fever, headache, feeling tired, and nausea, vomiting, diarrhea, or stomachache sometimes happen after Tdap vaccination.  People sometimes faint after medical procedures, including vaccination. Tell your provider if you feel dizzy or have vision changes or ringing in the ears.   As with any medicine, there is a very remote chance of a vaccine causing a severe allergic reaction, other serious injury, or death.   5. What if there is a serious problem?  An allergic reaction could occur after the vaccinated person leaves the clinic. If you see signs of a severe allergic reaction (hives, swelling of the face and throat, difficulty breathing, a fast heartbeat, dizziness, or weakness), call 9-1-1 and get the person to the nearest hospital.   For other signs that concern you, call your health care provider.   Adverse reactions should be reported to the Vaccine Adverse Event Reporting System (VAERS). Your health care provider will usually file this report, or you can do it yourself. Visit the VAERS website at www.vaers.hhs.gov or call 1-213.578.3757. VAERS is only for reporting reactions, and VAERS staff members do not give medical advice.   6. The National  Vaccine Injury Compensation Program  The National Vaccine Injury Compensation Program (VICP) is a federal program that was created to compensate people who may have been injured by certain vaccines. Claims regarding alleged injury or death due to vaccination have a time limit for filing, which may be as short as two years. Visit the VICP website at www.hrsa.gov/vaccinecompensation or call 1-193.138.4051 to learn about the program and about filing a claim.   7. How can I learn more?     Ask your health care provider.    Call your local or state health department.    Visit the website of the Food and Drug Administration (FDA) for vaccine package inserts and additional information at www.fda.gov/vaccines-blood-biologics/vaccines.  ? Contact the Centers for Disease Control and Prevention (CDC): Call 1-641.471.7606 ( 1-238-BMJ-INFO) or  ? Visit CDC s website at www.cdc.gov/vaccines.  Vaccine Information Statement   Tdap (Tetanus, Diphtheria, Pertussis) Vaccine  42 U.S.C.   300aa-26  8/6/2021  [employer] last reviewed this educational content on     5608-3175 [employer]. All rights reserved. This information is not intended as a substitute for professional medical care. Always follow your healthcare professional's instructions.        You have been provided the My Labor and Birth Wishes document.  Please review at home and bring to your next prenatal visit. Bring this sheet to the hospital for your birth. Give copies to your care team members and support person.   Additional copies can be found here:  [email or web address]

## 2023-06-21 ENCOUNTER — TRANSCRIBE ORDERS (OUTPATIENT)
Dept: MATERNAL FETAL MEDICINE | Facility: CLINIC | Age: 23
End: 2023-06-21
Payer: MEDICAID

## 2023-06-21 ENCOUNTER — OFFICE VISIT (OUTPATIENT)
Dept: OBGYN | Facility: CLINIC | Age: 23
End: 2023-06-21
Attending: ADVANCED PRACTICE MIDWIFE
Payer: MEDICAID

## 2023-06-21 VITALS — SYSTOLIC BLOOD PRESSURE: 92 MMHG | HEART RATE: 94 BPM | WEIGHT: 135.9 LBS | DIASTOLIC BLOOD PRESSURE: 58 MMHG

## 2023-06-21 DIAGNOSIS — D50.9 IRON DEFICIENCY ANEMIA, UNSPECIFIED IRON DEFICIENCY ANEMIA TYPE: ICD-10-CM

## 2023-06-21 DIAGNOSIS — O09.92 SUPERVISION OF HIGH RISK PREGNANCY IN SECOND TRIMESTER: Primary | ICD-10-CM

## 2023-06-21 DIAGNOSIS — O26.90 PREGNANCY RELATED CONDITION, ANTEPARTUM: Primary | ICD-10-CM

## 2023-06-21 DIAGNOSIS — O09.32 LIMITED PRENATAL CARE IN SECOND TRIMESTER: ICD-10-CM

## 2023-06-21 PROCEDURE — 99207 PR PRENATAL VISIT: CPT | Performed by: ADVANCED PRACTICE MIDWIFE

## 2023-06-21 PROCEDURE — G0463 HOSPITAL OUTPT CLINIC VISIT: HCPCS | Performed by: ADVANCED PRACTICE MIDWIFE

## 2023-06-21 RX ORDER — LANOLIN ALCOHOL/MO/W.PET/CERES
1000 CREAM (GRAM) TOPICAL DAILY
Qty: 60 TABLET | Refills: 1 | Status: ON HOLD | OUTPATIENT
Start: 2023-06-21 | End: 2023-10-02

## 2023-06-21 RX ORDER — MULTIVIT WITH MINERALS/LUTEIN
250 TABLET ORAL DAILY
Qty: 60 TABLET | Refills: 1 | Status: ON HOLD | OUTPATIENT
Start: 2023-06-21 | End: 2023-10-02

## 2023-06-21 NOTE — PATIENT INSTRUCTIONS
Thank you for trusting us with your care!     If you need to contact us for questions about:  Symptoms, Scheduling & Medical Questions; Non-urgent (2-3 day response) Mary Grace message, Urgent (needing response today) [phone] (if after 3:30pm next day response)   Prescriptions: Please call your Pharmacy   Billing: Renzo [phone] or [facility]:[phone]

## 2023-06-21 NOTE — PROGRESS NOTES
"[facility] Provider New OB Note    Reviewed RN intake note.    Cathleen is a 23 year old female who presents to clinic for an initial OB visit.  Present with [facility] -  Refugee representative.   at 25w3d with Estimated Date of Delivery: Oct 1, 2023 based on dating ultrasound at 24+1 on 23.     - Ultrasound done on 23 with gestational age measurement of 24+1, MACARIO 10/1/23. Pt reports that she does not have a known LMP.   Did receive some care in [place] (country) before moving to . Pt's  states that they may be able to get records- remembers seeing a document that may have said pt was due at the end of September.     Feels well overall but reports occasional weakness/dizziness. Denies cramping/contractions, vaginal bleeding, discharge or leakage of fluid. Report +fetal movement.  No HA, vision changes, ruq/epigastric pain.     Pt's partner Mariusz and her children are present with her in [place]. Has other family members as well.     She was seen at . Had abo/rh, rubella, CBC, HIV, anti-trep, hep b surface antibody and antigen, gc/ct, UC done. GBS negative.  B+.     Anemia- hemoglobin on  was 9.9. Has been taking prescribed iron supplement. Did not receive script for vit c or b12.     Reports 2 prior vaginal deliveries, thinks they were \"full term\" but notes they were both \"small\" and wonders why they were \"weak\" and had some trouble gaining weight and eating. Did not breastfeed. Has picture of scale of last daughter showing weight of 2.2kg. Denies ghtn, pre-e, gdm, pph.     Notes some back pain when she walks a lot. Interested in maternity belt prescription.     Not a candidate for aspirin, will complete GCT at 28 weeks.       -The patient does not have a history of immunosuppresion or HIV so Toxoplasma IgG/IgM WILL NOT be ordered.    -Assess risk for asymptomatic latent TB (prior infection, recent immigrant from epidemic areas, immunosuppression, living in overcrowded " environment)- address at next visit.     OBJECTIVE:  BP 92/58   Pulse 94   Wt 61.6 kg (135 lb 14.4 oz)   ROS: 10 point ROS neg other than the symptoms noted above in the HPI.      PSYCHIATRIC:  Denies mood changes, difficulty concentrating, depression, anxiety, panic attacks or post partum depression. Denies hx.     Past History:  Her past medical history   Past Medical History:   Diagnosis Date     No pertinent past medical history    .     Since her last LMP she denies use of alcohol, tobacco and street drugs.  HISTORY:  Family History   Problem Relation Age of Onset     No Known Problems Mother      No Known Problems Father      No Known Problems Brother      No Known Problems Brother      No Known Problems Brother      No Known Problems Brother      No Known Problems Brother      No Known Problems Brother      No Known Problems Brother      No Known Problems Brother      No Known Problems Brother      No Known Problems Maternal Grandmother      No Known Problems Maternal Grandfather      No Known Problems Paternal Grandmother      No Known Problems Paternal Grandfather      No Known Problems Sister      Social History     Socioeconomic History     Marital status:      Spouse name: Mariusz     Number of children: 2     Years of education: None     Highest education level: None   Tobacco Use     Smoking status: Never     Smokeless tobacco: Never   Substance and Sexual Activity     Alcohol use: Never     Drug use: Never     Sexual activity: Yes     Partners: Male     Current Outpatient Medications   Medication Sig     cyanocobalamin (VITAMIN B-12) 1000 MCG tablet Take 1 tablet (1,000 mcg) by mouth daily     ferrous sulfate (FE TABS) 325 (65 Fe) MG EC tablet Take 1 tablet (325 mg) by mouth every other day     vitamin C (ASCORBIC ACID) 250 MG tablet Take 1 tablet (250 mg) by mouth daily     Prenatal Vit-Fe Fumarate-FA (PRENATAL MULTIVITAMIN W/IRON) 27-0.8 MG tablet Take 1 tablet by mouth daily (Patient not  taking: Reported on 2023)     No current facility-administered medications for this visit.     No Known Allergies    ============================================  MEDICAL HISTORY  Past Medical History:   Diagnosis Date     No pertinent past medical history      No past surgical history on file.    OB History    Para Term  AB Living   3 2 2 0 0 2   SAB IAB Ectopic Multiple Live Births   0 0 0 0 2      # Outcome Date GA Lbr Adrian/2nd Weight Sex Delivery Anes PTL Lv   3 Current            2 Term 21   2.22 kg (4 lb 14.3 oz) F Vag-Spont   CHAPARRITA   1 Term 20    F Vag-Spont   CHAPARRITA      Obstetric Comments   Report that their first daughter's birth documents do not reflect her real birthday, but are unsure of her actual date of birth.  They state it is close to 21.       They also report that both daughters were born full term, but do not recall the actual gestation, and report that birth weight was low- Mariusz pulled up a photo of their second daughter on the scale at her birth, and it was 2.22kg.              GYN History- pap hx needs review, plan pp if due    I personally reviewed the past social/family/medical and surgical history on the date of service.     EXAM:  BP 92/58   Pulse 94   Wt 61.6 kg (135 lb 14.4 oz)    EXAM:  GENERAL:  Pleasant pregnant female, alert, cooperative and well groomed.  SKIN:  Warm and dry, without lesions or rashes  HEAD: Symmetrical features.  MOUTH:  Buccal mucosa pink, moist without lesions.  Teeth in good repair.    NECK:  Thyroid without enlargement and nodules.  Lymph nodes not palpable.   LUNGS:  Clear to auscultation.  BREAST:   deferred  HEART:  RRR without murmur.  ABDOMEN: Soft without masses , tenderness or organomegaly.  No CVA tenderness.  Uterus palpable at size equal to dates.  No scars noted..   MUSCULOSKELETAL:  Full range of motion  EXTREMITIES:  No edema. No significant varicosities.   PELVIC EXAM: deferred    ASSESSMENT:  23 year old ,  25w3d weeks of pregnancy with MACARIO of Oct 1, 2023 by late dating US  Intrauterine pregnancy 25w3d size  consistent with dates      ICD-10-CM    1. Supervision of high risk pregnancy in second trimester  O09.92 vitamin C (ASCORBIC ACID) 250 MG tablet     cyanocobalamin (VITAMIN B-12) 1000 MCG tablet     OB/Maternity Back Brace for DME - ONLY FOR DME     [facility] Referral - Pregnancy     Glucose 1 Hour     CBC with Platelets     Treponema Abs w Reflex to RPR and Titer     25- OH-Vitamin D     Varicella Zoster Virus Antibody IgG     Hgb with reflex to ELP or RBC Solubility (Sickle Cell Screen)     CANCELED: [facility]tr Referral - Pregnancy      2. Iron deficiency anemia, unspecified iron deficiency anemia type  D50.9       3. Limited prenatal care in second trimester  O09.32           Cathleen was seen today for prenatal care.    Diagnoses and all orders for this visit:    Supervision of high risk pregnancy in second trimester  -     Cancel: [facility] Referral - Pregnancy; Future  -     vitamin C (ASCORBIC ACID) 250 MG tablet; Take 1 tablet (250 mg) by mouth daily  -     cyanocobalamin (VITAMIN B-12) 1000 MCG tablet; Take 1 tablet (1,000 mcg) by mouth daily  -     OB/Maternity Back Brace for DME - ONLY FOR DME  -     [facility] Referral - Pregnancy; Future  -     Glucose 1 Hour; Future  -     CBC with Platelets; Future  -     Treponema Abs w Reflex to RPR and Titer; Future  -     25- OH-Vitamin D; Future  -     Varicella Zoster Virus Antibody IgG; Future  -     Hgb with reflex to ELP or RBC Solubility (Sickle Cell Screen); Future    Iron deficiency anemia, unspecified iron deficiency anemia type    Limited prenatal care in second trimester          Orders Placed This Encounter   Procedures     Glucose 1 Hour     CBC with Platelets     Treponema Abs w Reflex to RPR and Titer     25- OH-Vitamin D     Varicella Zoster Virus Antibody IgG     Hgb with reflex to ELP or RBC Solubility (Sickle Cell  Screen)     [facility] Referral - Pregnancy     OB/Maternity Back Brace for DME - ONLY FOR DME        PLAN:  - Reviewed use of triage nurse line and contacting the on-call provider after hours for an urgent need such as fever, vagina bleeding, bladder or vaginal infection, rupture of membranes,  or term labor.    - Reviewed best evidence for: weight gain for her weight and height for pregnancy  - Reviewed healthy diet and foods to avoid, exercise and activity during pregnancy; avoiding exposure to toxoplasmosis; and maintenance of a generally healthy lifestyle.   - Discussed the harms, benefits, side effects and alternative therapies for current prescribed and OTC medications. Patient was encouraged to start/continue prenatal vitamins as tolerated.    - Oriented to Practice, types of care, and how to reach a provider. CNM care.     - All pt's questions discussed and answered.  Pt verbalized understanding of and agreement to plan of care.     Additional information needed: last pap, tb risk/need for screening.    - Reviewed history.   - JERILYN completed for records.  to assist.   Discussed 3rd trimester growth ultrasound could be considered d/t pt's report hx of small babies and late dating.   - M referral placed for level 2 ultrasound.   - Reviewed labs from triage visit.  - Reviewed anemia- hemoglobin 9.9. Continue oral iron supplementation. Prescription sent for vitamin C and vitamin b12.   - Future orders placed for EOB labs plus needed additional labs- to be done at 28 week visit: 1 hour glucose, cbc with plts, anti-trep, vitamin D, hemoglobin electrophoresis, varicella.  Plan tdap at EOB visit.   - Will need GBS repeated at 36-37 weeks.  - Maternity belt prescription given.    EOB next visit.     - Continue scheduled prenatal care, RTC in 2 weeks for EOB and prn if questions or concerns    JERAMY Azul, LAURENT

## 2023-06-21 NOTE — LETTER
"2023       RE: Cathleen Urbano  [address]  [facility] [address]     Dear Colleague,    Thank you for referring your patient, Cathleen Urbano, to the [facility] [place] [facility]. Please see a copy of my visit note below.    [facility] Provider New OB Note    Reviewed RN intake note.    Cathleen is a 23 year old female who presents to clinic for an initial OB visit.  Present with Ra[employer] -  Refugee representative.   at 25w3d with Estimated Date of Delivery: Oct 1, 2023 based on dating ultrasound at 24+1 on 23.     - Ultrasound done on 23 with gestational age measurement of 24+1, MACARIO 10/1/23. Pt reports that she does not have a known LMP.   Did receive some care in [place] (country) before moving to . Pt's  states that they may be able to get records- remembers seeing a document that may have said pt was due at the end of September.     Feels well overall but reports occasional weakness/dizziness. Denies cramping/contractions, vaginal bleeding, discharge or leakage of fluid. Report +fetal movement.  No HA, vision changes, ruq/epigastric pain.     Pt's partner Mariusz and her children are present with her in [place]. Has other family members as well.     She was seen at . Had abo/rh, rubella, CBC, HIV, anti-trep, hep b surface antibody and antigen, gc/ct, UC done. GBS negative.  B+.     Anemia- hemoglobin on  was 9.9. Has been taking prescribed iron supplement. Did not receive script for vit c or b12.     Reports 2 prior vaginal deliveries, thinks they were \"full term\" but notes they were both \"small\" and wonders why they were \"weak\" and had some trouble gaining weight and eating. Did not breastfeed. Has picture of scale of last daughter showing weight of 2.2kg. Denies ghtn, pre-e, gdm, pph.     Notes some back pain when she walks a lot. Interested in maternity belt prescription.     Not a candidate " for aspirin, will complete GCT at 28 weeks.       -The patient does not have a history of immunosuppresion or HIV so Toxoplasma IgG/IgM WILL NOT be ordered.    -Assess risk for asymptomatic latent TB (prior infection, recent immigrant from epidemic areas, immunosuppression, living in overcrowded environment)- address at next visit.     OBJECTIVE:  BP 92/58   Pulse 94   Wt 61.6 kg (135 lb 14.4 oz)   ROS: 10 point ROS neg other than the symptoms noted above in the HPI.      PSYCHIATRIC:  Denies mood changes, difficulty concentrating, depression, anxiety, panic attacks or post partum depression. Denies hx.     Past History:  Her past medical history   Past Medical History:   Diagnosis Date    No pertinent past medical history    .     Since her last LMP she denies use of alcohol, tobacco and street drugs.  HISTORY:  Family History   Problem Relation Age of Onset    No Known Problems Mother     No Known Problems Father     No Known Problems Brother     No Known Problems Brother     No Known Problems Brother     No Known Problems Brother     No Known Problems Brother     No Known Problems Brother     No Known Problems Brother     No Known Problems Brother     No Known Problems Brother     No Known Problems Maternal Grandmother     No Known Problems Maternal Grandfather     No Known Problems Paternal Grandmother     No Known Problems Paternal Grandfather     No Known Problems Sister      Social History     Socioeconomic History    Marital status:      Spouse name: Mariusz    Number of children: 2    Years of education: None    Highest education level: None   Tobacco Use    Smoking status: Never    Smokeless tobacco: Never   Substance and Sexual Activity    Alcohol use: Never    Drug use: Never    Sexual activity: Yes     Partners: Male     Current Outpatient Medications   Medication Sig    cyanocobalamin (VITAMIN B-12) 1000 MCG tablet Take 1 tablet (1,000 mcg) by mouth daily    ferrous sulfate (FE TABS) 325 (65 Fe)  MG EC tablet Take 1 tablet (325 mg) by mouth every other day    vitamin C (ASCORBIC ACID) 250 MG tablet Take 1 tablet (250 mg) by mouth daily    Prenatal Vit-Fe Fumarate-FA (PRENATAL MULTIVITAMIN W/IRON) 27-0.8 MG tablet Take 1 tablet by mouth daily (Patient not taking: Reported on 2023)     No current facility-administered medications for this visit.     No Known Allergies    ============================================  MEDICAL HISTORY  Past Medical History:   Diagnosis Date    No pertinent past medical history      No past surgical history on file.    OB History    Para Term  AB Living   3 2 2 0 0 2   SAB IAB Ectopic Multiple Live Births   0 0 0 0 2      # Outcome Date GA Lbr Adrian/2nd Weight Sex Delivery Anes PTL Lv   3 Current            2 Term 21   2.22 kg (4 lb 14.3 oz) F Vag-Spont   CHAPARRITA   1 Term 20    F Vag-Spont   CHAPARRITA      Obstetric Comments   Report that their first daughter's birth documents do not reflect her real birthday, but are unsure of her actual date of birth.  They state it is close to 21.       They also report that both daughters were born full term, but do not recall the actual gestation, and report that birth weight was low- Mariusz pulled up a photo of their second daughter on the scale at her birth, and it was 2.22kg.              GYN History- pap hx needs review, plan pp if due    I personally reviewed the past social/family/medical and surgical history on the date of service.     EXAM:  BP 92/58   Pulse 94   Wt 61.6 kg (135 lb 14.4 oz)    EXAM:  GENERAL:  Pleasant pregnant female, alert, cooperative and well groomed.  SKIN:  Warm and dry, without lesions or rashes  HEAD: Symmetrical features.  MOUTH:  Buccal mucosa pink, moist without lesions.  Teeth in good repair.    NECK:  Thyroid without enlargement and nodules.  Lymph nodes not palpable.   LUNGS:  Clear to auscultation.  BREAST:   deferred  HEART:  RRR without murmur.  ABDOMEN: Soft without masses ,  tenderness or organomegaly.  No CVA tenderness.  Uterus palpable at size equal to dates.  No scars noted..   MUSCULOSKELETAL:  Full range of motion  EXTREMITIES:  No edema. No significant varicosities.   PELVIC EXAM: deferred    ASSESSMENT:  23 year old , 25w3d weeks of pregnancy with MACARIO of Oct 1, 2023 by late dating US  Intrauterine pregnancy 25w3d size  consistent with dates      ICD-10-CM    1. Supervision of high risk pregnancy in second trimester  O09.92 vitamin C (ASCORBIC ACID) 250 MG tablet     cyanocobalamin (VITAMIN B-12) 1000 MCG tablet     OB/Maternity Back Brace for DME - ONLY FOR DME     [facility] Referral - Pregnancy     Glucose 1 Hour     CBC with Platelets     Treponema Abs w Reflex to RPR and Titer     25- OH-Vitamin D     Varicella Zoster Virus Antibody IgG     Hgb with reflex to ELP or RBC Solubility (Sickle Cell Screen)     CANCELED: [facility] Referral - Pregnancy      2. Iron deficiency anemia, unspecified iron deficiency anemia type  D50.9       3. Limited prenatal care in second trimester  O09.32           Cathleen was seen today for prenatal care.    Diagnoses and all orders for this visit:    Supervision of high risk pregnancy in second trimester  -     Cancel: [facility] Referral - Pregnancy; Future  -     vitamin C (ASCORBIC ACID) 250 MG tablet; Take 1 tablet (250 mg) by mouth daily  -     cyanocobalamin (VITAMIN B-12) 1000 MCG tablet; Take 1 tablet (1,000 mcg) by mouth daily  -     OB/Maternity Back Brace for DME - ONLY FOR DME  -     [facility] Referral - Pregnancy; Future  -     Glucose 1 Hour; Future  -     CBC with Platelets; Future  -     Treponema Abs w Reflex to RPR and Titer; Future  -     25- OH-Vitamin D; Future  -     Varicella Zoster Virus Antibody IgG; Future  -     Hgb with reflex to ELP or RBC Solubility (Sickle Cell Screen); Future    Iron deficiency anemia, unspecified iron deficiency anemia type    Limited prenatal care in second  trimester          Orders Placed This Encounter   Procedures    Glucose 1 Hour    CBC with Platelets    Treponema Abs w Reflex to RPR and Titer    25- OH-Vitamin D    Varicella Zoster Virus Antibody IgG    Hgb with reflex to ELP or RBC Solubility (Sickle Cell Screen)    [facility] Referral - Pregnancy    OB/Maternity Back Brace for DME - ONLY FOR DME        PLAN:  - Reviewed use of triage nurse line and contacting the on-call provider after hours for an urgent need such as fever, vagina bleeding, bladder or vaginal infection, rupture of membranes,  or term labor.    - Reviewed best evidence for: weight gain for her weight and height for pregnancy  - Reviewed healthy diet and foods to avoid, exercise and activity during pregnancy; avoiding exposure to toxoplasmosis; and maintenance of a generally healthy lifestyle.   - Discussed the harms, benefits, side effects and alternative therapies for current prescribed and OTC medications. Patient was encouraged to start/continue prenatal vitamins as tolerated.    - Oriented to Practice, types of care, and how to reach a provider. CNM care.     - All pt's questions discussed and answered.  Pt verbalized understanding of and agreement to plan of care.     Additional information needed: last pap, tb risk/need for screening.    - Reviewed history.   - JERILYN completed for records.  to assist.   Discussed 3rd trimester growth ultrasound could be considered d/t pt's report hx of small babies and late dating.   - MFM referral placed for level 2 ultrasound.   - Reviewed labs from triage visit.  - Reviewed anemia- hemoglobin 9.9. Continue oral iron supplementation. Prescription sent for vitamin C and vitamin b12.   - Future orders placed for EOB labs plus needed additional labs- to be done at 28 week visit: 1 hour glucose, cbc with plts, anti-trep, vitamin D, hemoglobin electrophoresis, varicella.  Plan tdap at EOB visit.   - Will need GBS repeated at 36-37  weeks.  - Maternity belt prescription given.    EOB next visit.     - Continue scheduled prenatal care, RTC in 2 weeks for EOB and prn if questions or concerns    JERAMY Azul, MAR[place]

## 2023-06-22 PROBLEM — O09.92 SUPERVISION OF HIGH RISK PREGNANCY IN SECOND TRIMESTER: Status: ACTIVE | Noted: 2023-06-22

## 2023-06-22 PROBLEM — D50.9 IRON DEFICIENCY ANEMIA, UNSPECIFIED IRON DEFICIENCY ANEMIA TYPE: Status: ACTIVE | Noted: 2023-06-22

## 2023-06-22 PROBLEM — O09.32 LIMITED PRENATAL CARE IN SECOND TRIMESTER: Status: ACTIVE | Noted: 2023-06-22

## 2023-06-22 PROBLEM — N93.9 VAGINAL BLEEDING: Status: RESOLVED | Noted: 2023-06-05 | Resolved: 2023-06-22

## 2023-06-30 ENCOUNTER — PRE VISIT (OUTPATIENT)
Dept: MATERNAL FETAL MEDICINE | Facility: CLINIC | Age: 23
End: 2023-06-30
Payer: MEDICAID

## 2023-07-06 ENCOUNTER — PRENATAL OFFICE VISIT (OUTPATIENT)
Dept: OBGYN | Facility: CLINIC | Age: 23
End: 2023-07-06
Attending: REGISTERED NURSE
Payer: MEDICAID

## 2023-07-06 ENCOUNTER — LAB (OUTPATIENT)
Dept: LAB | Facility: CLINIC | Age: 23
End: 2023-07-06
Attending: REGISTERED NURSE
Payer: MEDICAID

## 2023-07-06 VITALS — HEART RATE: 99 BPM | DIASTOLIC BLOOD PRESSURE: 58 MMHG | SYSTOLIC BLOOD PRESSURE: 92 MMHG | WEIGHT: 137.6 LBS

## 2023-07-06 DIAGNOSIS — O09.92 SUPERVISION OF HIGH RISK PREGNANCY IN SECOND TRIMESTER: ICD-10-CM

## 2023-07-06 DIAGNOSIS — O09.92 SUPERVISION OF HIGH RISK PREGNANCY IN SECOND TRIMESTER: Primary | ICD-10-CM

## 2023-07-06 DIAGNOSIS — D50.9 IRON DEFICIENCY ANEMIA, UNSPECIFIED IRON DEFICIENCY ANEMIA TYPE: ICD-10-CM

## 2023-07-06 DIAGNOSIS — Z60.3 LANGUAGE BARRIER: ICD-10-CM

## 2023-07-06 DIAGNOSIS — Z75.8 LANGUAGE BARRIER: ICD-10-CM

## 2023-07-06 LAB
DEPRECATED CALCIDIOL+CALCIFEROL SERPL-MC: 9 UG/L (ref 20–75)
ERYTHROCYTE [DISTWIDTH] IN BLOOD BY AUTOMATED COUNT: 13 % (ref 10–15)
GLUCOSE 1H P 50 G GLC PO SERPL-MCNC: 129 MG/DL (ref 70–129)
HCT VFR BLD AUTO: 31.6 % (ref 35–47)
HGB BLD-MCNC: 10.6 G/DL (ref 11.7–15.7)
MCH RBC QN AUTO: 31.3 PG (ref 26.5–33)
MCHC RBC AUTO-ENTMCNC: 33.5 G/DL (ref 31.5–36.5)
MCV RBC AUTO: 93 FL (ref 78–100)
PLATELET # BLD AUTO: 238 10E3/UL (ref 150–450)
RBC # BLD AUTO: 3.39 10E6/UL (ref 3.8–5.2)
T PALLIDUM AB SER QL: NONREACTIVE
VZV IGG SER QL IA: 1022 INDEX
VZV IGG SER QL IA: POSITIVE
WBC # BLD AUTO: 9 10E3/UL (ref 4–11)

## 2023-07-06 PROCEDURE — G0463 HOSPITAL OUTPT CLINIC VISIT: HCPCS | Mod: 25 | Performed by: REGISTERED NURSE

## 2023-07-06 PROCEDURE — 82950 GLUCOSE TEST: CPT

## 2023-07-06 PROCEDURE — 99207 PR PRENATAL VISIT: CPT | Performed by: REGISTERED NURSE

## 2023-07-06 PROCEDURE — 250N000011 HC RX IP 250 OP 636

## 2023-07-06 PROCEDURE — 85660 RBC SICKLE CELL TEST: CPT

## 2023-07-06 PROCEDURE — 90471 IMMUNIZATION ADMIN: CPT

## 2023-07-06 PROCEDURE — 85027 COMPLETE CBC AUTOMATED: CPT

## 2023-07-06 PROCEDURE — 82306 VITAMIN D 25 HYDROXY: CPT

## 2023-07-06 PROCEDURE — G0463 HOSPITAL OUTPT CLINIC VISIT: HCPCS | Performed by: REGISTERED NURSE

## 2023-07-06 PROCEDURE — 86780 TREPONEMA PALLIDUM: CPT

## 2023-07-06 PROCEDURE — 36415 COLL VENOUS BLD VENIPUNCTURE: CPT

## 2023-07-06 PROCEDURE — 90715 TDAP VACCINE 7 YRS/> IM: CPT

## 2023-07-06 PROCEDURE — 86787 VARICELLA-ZOSTER ANTIBODY: CPT

## 2023-07-06 SDOH — SOCIAL STABILITY - SOCIAL INSECURITY: ACCULTURATION DIFFICULTY: Z60.3

## 2023-07-06 NOTE — PROGRESS NOTES
" 23 year old, , 27w4d, phone : [place]  Denies vaginal bleeding/LOF. Denies contractions/cramping. Positive fetal movement. Denies HA, visual changes, RUQ/epigastric pain.     The patient presents with the following concerns:     - \"feeling weak and dizzy started about 25 days ago.\" Reports that she is hydrated and keeping food down.   - Reporting back pain- using tylenol, hot/cold packs, and maternity belt with mild relief.    - hemoglobin 9.9- taking iron at home every other day.     Education completed today includes  labor, TDAP, counting movements and getting enough iron, [facility] handout.  Birth preferences reviewed: Medicated, plans epidural  Labor support: possibly     Feeding plans :    Contraception planned:  mini pill / progesterone only pill or IUD (does not want anyone to know).  The following labs were ordered today:       GCT, CBC w platelets, Vitamin d, Anti-treponema  Water birth consent form was not given.   Blood type: B POS    Antibody Screen   Date Value Ref Range Status   2023 Negative Negative Final   , Rhogam  was notgiven.  TDAP  Was given.  A/P:  Encounter Diagnoses   Name Primary?     Supervision of high risk pregnancy in second trimester Yes     Language barrier      Iron deficiency anemia, unspecified iron deficiency anemia type      Orders Placed This Encounter   Procedures     TDAP VACCINE (Adacel, Boostrix)  [[id number]]     No orders of the defined types were placed in this encounter.    - Level 2 US scheduled 23.  - Continue with iron supplementation. Also encouraged rest, hydration and changing positions slowly for dizziness. If dizziness worsens or does not improve, call clinic.    - Reviewed phone numbers and when to call/present to triage.     Continue scheduled prenatal care. RTC in 2 weeks and PRN if questions or concerns.     Kari WELLS, am serving as a scribe; to document services personally performed by  Provider based " on data collection and the provider's statements to me.     Kari Sena DNP Student     I agree with the PFSH and ROS as completed by Kari Sena except for changes made by me. The remainder of the encounter was performed by me and scribed by Kari Sena. The scribed note accurately reflects my personal services and decisions made by me.     JERAMY Honeycutt CNM

## 2023-07-06 NOTE — PATIENT INSTRUCTIONS
Thank you for trusting us with your care!     If you need to contact us for questions about:  Symptoms, Scheduling & Medical Questions; Non-urgent (2-3 day response) Mary Grace message, Urgent (needing response today) [phone] (if after 3:30pm next day response)   Prescriptions: Please call your Pharmacy   Billing: [facility] [phone] or [facility]:[phone]      Blood Glucose Screening During Pregnancy  Gestational diabetes is diabetes that only pregnant women get. Changes in your body during pregnancy can cause high blood sugar (glucose). This can cause problems for you and your baby. It is a serious condition. But it can be controlled.      Your healthcare provider will talk with you about blood glucose screening.     Who is at risk for gestational diabetes?  You are at risk of getting gestational diabetes if any of the risk factors below apply to you. The risk for this condition gets higher as your number of risk factors increases:     You are , , , , or .    You weigh more than your healthcare provider says is healthy for you.    You have a relative with diabetes.    You are older than 25.    You had gestational diabetes during a past pregnancy.    You had a stillbirth or a very large baby before.    You have a history of abnormal glucose tolerance.    You have sugar in your urine at the first prenatal visit.    You have metabolic syndrome, PCOS (polycystic ovary syndrome), are using glucocorticoids, or have high blood pressure.    You are pregnant with twins or more  What happens during a screening?  Here is what to expect during a blood glucose screening:    There is conflicting advice for screening. But the American College of Obstetricians and Gynecologists currently advises that all pregnant women be screened for gestational diabetes. When you are screened depends on your risk. Women are tested at 24 to 28 weeks of pregnancy. Women at high  risk may be tested when they first learn they are pregnant.    To do the screening, a blood sample is taken. Your blood sugar level is measured.    If the results show a high blood sugar level, a glucose tolerance test may be ordered. You will drink a certain amount of sugar. This test measures how long it takes for sugar to leave your blood. The test will show if you have gestational diabetes.  What to know if you test positive  Here are some things you need to know:    Gestational diabetes can be treated. The best way to control it is to find out you have it early and start treatment quickly.    This condition can cause problems for the mother during pregnancy. It can also cause problems with the baby during pregnancy, delivery, and after. Treatment greatly lowers the chance for problems.    The changes in your body that cause gestational diabetes normally happen only when you are pregnant. After the baby is born, your body goes back to normal. The condition goes away. But you may be more likely to have type 2 diabetes later. Talk with your healthcare provider about ways to help prevent type 2 diabetes.  Treating gestational diabetes  Here is how to treat gestational diabetes:    You ll need to check your blood sugar often. You can do this at home. Prick your finger and check a drop of blood on a glucose monitor. Your healthcare provider will show you how and when to check your blood sugar. They will talk about your target blood sugar level.    To manage your blood sugar, you will be given a special plan. It will likely include meal planning and getting regular exercise. Some women need to take a hormone called insulin. Others may take medicine to help control their blood sugar.  [employer] last reviewed this educational content on 12/1/2022 2000-2023 [employer]. All rights reserved. This information is not intended as a substitute for professional medical care. Always follow your healthcare  professional's instructions.          Tdap (Tetanus, Diphtheria, Pertussis) Vaccine: What You Need to Know    This is a Vaccine Information Statement from the CDC.   Many vaccine information statements are available in Croatian and other languages. See www.immunize.org/vis   Hojas de información sobre vacunas están disponibles en español y en muchos otros idiomas. Visite www.immunize.org/vis   1. Why get vaccinated?   Tdap vaccine can prevent tetanus, diphtheria, and pertussis.   Diphtheria and pertussis spread from person to person. Tetanus enters the body through cuts or wounds.     TETANUS (T) causes painful stiffening of the muscles. Tetanus can lead to serious health problems, including being unable to open the mouth, having trouble swallowing and breathing, or death.    DIPHTHERIA (D) can lead to difficulty breathing, heart failure, paralysis, or death.    PERTUSSIS (aP), also known as  whooping cough,  can cause uncontrollable, violent coughing that makes it hard to breathe, eat, or drink. Pertussis can be extremely serious especially in babies and young children, causing pneumonia, convulsions, brain damage, or death. In teens and adults, it can cause weight loss, loss of bladder control, passing out, and rib fractures from severe coughing.  2. Tdap vaccine   Tdap is only for children 7 years and older, adolescents, and adults.   Adolescents should receive a single dose of Tdap, preferably at age 11 or 12 years.   Pregnant people should get a dose of Tdap during every pregnancy, preferably during the early part of the third trimester, to help protect the  from pertussis. Infants are most at risk for severe, life-threatening complications from pertussis.   Adults who have never received Tdap should get a dose of Tdap.   Also, adults should receive a booster dose of either Tdap or Td (a different vaccine that protects against tetanus and diphtheria but not pertussis) every 10 years , or after 5 years in  the case of a severe or dirty wound or burn.   Tdap may be given at the same time as other vaccines.   3. Talk with your health care provider  Tell your vaccination provider if the person getting the vaccine:     Has had an allergic reaction after a previous dose of any vaccine that protects against tetanus, diphtheria, or pertussis , or has any severe, life-threatening allergies    Has had a coma, decreased level of consciousness, or prolonged seizures within 7 days after a previous dose of any pertussis vaccine (DTP, DTaP, or Tdap)    Has seizures or another nervous system problem    Has ever had Guillain-Barré Syndrome (also called  GBS )    Has had severe pain or swelling after a previous dose of any vaccine that protects against tetanus or diphtheria  In some cases, your health care provider may decide to postpone Tdap vaccination until a future visit.   People with minor illnesses, such as a cold, may be vaccinated. People who are moderately or severely ill should usually wait until they recover before getting Tdap vaccine.   Your health care provider can give you more information.  4. Risks of a vaccine reaction     Pain, redness, or swelling where the shot was given, mild fever, headache, feeling tired, and nausea, vomiting, diarrhea, or stomachache sometimes happen after Tdap vaccination.  People sometimes faint after medical procedures, including vaccination. Tell your provider if you feel dizzy or have vision changes or ringing in the ears.   As with any medicine, there is a very remote chance of a vaccine causing a severe allergic reaction, other serious injury, or death.   5. What if there is a serious problem?  An allergic reaction could occur after the vaccinated person leaves the clinic. If you see signs of a severe allergic reaction (hives, swelling of the face and throat, difficulty breathing, a fast heartbeat, dizziness, or weakness), call 9-1-1 and get the person to the nearest hospital.   For  other signs that concern you, call your health care provider.   Adverse reactions should be reported to the Vaccine Adverse Event Reporting System (VAERS). Your health care provider will usually file this report, or you can do it yourself. Visit the VAERS website at www.vaers.hhs.gov or call 1-750.845.8373. VAERS is only for reporting reactions, and VAERS staff members do not give medical advice.   6. The National Vaccine Injury Compensation Program  The National Vaccine Injury Compensation Program (VICP) is a federal program that was created to compensate people who may have been injured by certain vaccines. Claims regarding alleged injury or death due to vaccination have a time limit for filing, which may be as short as two years. Visit the VICP website at www.[facility].gov/vaccinecompensation or call 1-155.931.1092 to learn about the program and about filing a claim.   7. How can I learn more?     Ask your health care provider.    Call your local or state health department.    Visit the website of the Food and Drug Administration (FDA) for vaccine package inserts and additional information at www.fda.gov/vaccines-blood-biologics/vaccines.  ? Contact the Centers for Disease Control and Prevention (CDC): Call 1-383.125.8348 ( 8-952-KKJ-INFO) or  ? Visit CDC s website at www.cdc.gov/vaccines.  Vaccine Information Statement   Tdap (Tetanus, Diphtheria, Pertussis) Vaccine  42 U.S.C.   [id number]  8/6/2021  [employer] last reviewed this educational content on     6948-4082 [employer]. All rights reserved. This information is not intended as a substitute for professional medical care. Always follow your healthcare professional's instructions.        Counting Your Baby's Movements   Counting your unborn baby's movements will assure you of your baby's health.   The best time to count is when your baby is most active. Do it at the same time every day.  To keep track of your baby's movements, use the attached chart.  Bring the chart with you to each clinic visit.  1. Do not smoke for at least 2 hours before counting your baby's movements. (In fact, it's best to quit smoking if you're pregnant.)  2. Lie on your side. Put your hand on your baby.  3. Write the time you start counting movements.  4. Count the next 10 kicks, rolls, and other movements.  5. Write the time when your baby has finished 10 movements.  6. If you reach 10 movements within 2 hours, you're done for the day.  Call your care provider right away if:    You feel no movement for the first hour.    It takes more than 2 hours to feel 10 movements.    There's a change in the normal pattern of movements.  Your care provider's phone number is:   ________________________________________  The number to your [facility] center is:   ________________________________________     For informational purposes only. Not to replace the advice of your health care provider. Copyright   ,  [facility]. All rights reserved. Clinically reviewed by Merary Nix RN, [facility]. [employer] [id number] - REV 10/21.       Understanding  Labor  Going into labor before week 37 of pregnancy is called  labor.  labor can cause your baby to be born too soon. This can lead to health problems for your baby.     Before labor, the cervix is thick and closed.      In  labor, the cervix begins to efface (thin) and dilate (open).     Symptoms of  labor  If you think you re having  labor, get medical help right away. Contractions alone don t mean you re in  labor. What matters more are changes in your cervix. The cervix is the opening at the lower end of the uterus. Symptoms of  labor include:    4 or more contractions per hour    Strong contractions    Constant menstrual-like cramping    Low-back pain    Mucous or bloody fluid from the vagina    Bleeding or spotting in the second or third  trimester  Evaluating  labor  Your healthcare provider will try to find out if you re in  labor or just having contractions. They may watch you for a few hours. You may have these tests:    Pelvic exam. This is to see if your cervix has effaced (thinned) and dilated (opened).    Uterine activity monitoring. This is used to detect contractions.    Fetal monitoring. This is done to check the health of your baby.    Ultrasound. This test looks at your baby s size and position.    Amniocentesis. This test checks how mature your baby s lungs are.  Caring for yourself at home  If you have  contractions, but your cervix is still thick and closed, your healthcare provider may tell you to:    Drink plenty of water.    Do fewer activities.    Rest in bed on your side.    Don't have intercourse or stimulate your nipples.  When to call your healthcare provider  Call your healthcare provider if you have any of these:    4 or more contractions per hour    Bag of water breaks    Bleeding or spotting  If you need hospital care   labor often means that you need hospital care. You may need complete bed rest. You may have an IV (intravenous) line in your arm or hand. This is to give you fluids. You may be given pills or injections. These are done to help prevent contractions. You may get a medicine called a corticosteroid. This is to help your baby s lungs mature more quickly.  Are you at risk?  Any pregnant woman can have  labor. It may start for no reason. But these risk factors can increase your chances:    Past  labor or early birth    Smoking, drug, or alcohol use in pregnancy    A multiple pregnancy (twins or more)    Problems with the shape of the uterus    Bleeding during the pregnancy  The dangers of  birth  A baby born too soon may have health problems. This is because the baby didn t have enough time to grow. Some of the risks for your baby include:    Not breastfeeding or  feeding well    Having immature lungs    Bleeding in the brain    Death  Reaching term  Your goal is to get as close to term (week 37 or later) as you can before giving birth. The closer you get to term, the higher your chance of having a healthy baby. Work with your healthcare provider. Together, you can take steps that may keep you from giving birth too early.  [employer] last reviewed this educational content on 10/1/2021    0067-6139 [employer]. All rights reserved. This information is not intended as a substitute for professional medical care. Always follow your healthcare professional's instructions.          Pregnancy: Your Third Trimester Changes  As the baby grows, your body changes, too. You may also see signs that your body is getting ready for labor. Be patient. Within a few more weeks, your baby will be born.   How you are changing  Your body is preparing for the birth of your baby. Some of the most common changes are listed below. If you have any questions or concerns, ask your healthcare provider:     You ll gain more weight from fluids, extra blood, and fat deposits.    Your breasts will grow as your body gets ready to feed the baby. They may be more tender. You may also notice a slight yellow or white discharge from the nipples.    Discharge from your vagina may increase. This is normal.    You might see some skin color changes on your forehead, cheeks, or nose. Most of these will go away after you deliver.  How your baby is growing  Month 7  Your baby can open and close their eyes and weighs around 4 pounds (1.8 kg). If born prematurely (too early), your baby would likely survive with special care.     Month 8  Your baby is building up body fat and weighs around 6 pounds (2.7 kg).    Month 9  Your baby weighs nearly 7 pounds (3.2 kg) and is about 19 to 21 inches long. In other words, any day now...     [employer] last reviewed this educational content on 9/1/2021 2000-2023 [employer]  [employer]. All rights reserved. This information is not intended as a substitute for professional medical care. Always follow your healthcare professional's instructions.

## 2023-07-07 PROBLEM — E55.9 VITAMIN D DEFICIENCY: Status: ACTIVE | Noted: 2023-07-07

## 2023-07-07 LAB
HGB A1 MFR BLD: 96.8 %
HGB A2 MFR BLD: 2.9 %
HGB C MFR BLD: 0 %
HGB E MFR BLD: 0 %
HGB F MFR BLD: 0.3 %
HGB FRACT BLD ELPH-IMP: NORMAL
HGB OTHER MFR BLD: 0 %
HGB S BLD QL SOLY: NORMAL
HGB S MFR BLD: 0 %
PATH INTERP BLD-IMP: NORMAL

## 2023-07-10 DIAGNOSIS — R79.89 LOW VITAMIN D LEVEL: Primary | ICD-10-CM

## 2023-07-10 RX ORDER — MULTIVIT-MIN/IRON/FOLIC ACID/K 18-600-40
2 CAPSULE ORAL DAILY
Qty: 180 CAPSULE | Refills: 3 | Status: SHIPPED | OUTPATIENT
Start: 2023-07-10 | End: 2023-11-13

## 2023-08-02 ENCOUNTER — TELEPHONE (OUTPATIENT)
Dept: OBGYN | Facility: CLINIC | Age: 23
End: 2023-08-02
Payer: MEDICAID

## 2023-08-02 NOTE — TELEPHONE ENCOUNTER
Received call from [facility]. Patient has no-showed multiple appointments so she is being removed from their list. Message sent to provider.

## 2023-09-27 ENCOUNTER — HOSPITAL ENCOUNTER (OUTPATIENT)
Facility: CLINIC | Age: 23
End: 2023-09-27
Admitting: MIDWIFE
Payer: COMMERCIAL

## 2023-09-27 ENCOUNTER — HOSPITAL ENCOUNTER (OUTPATIENT)
Facility: CLINIC | Age: 23
Discharge: HOME | End: 2023-09-27
Attending: ADVANCED PRACTICE MIDWIFE | Admitting: MIDWIFE
Payer: COMMERCIAL

## 2023-09-27 VITALS — TEMPERATURE: 98.9 F | RESPIRATION RATE: 18 BRPM | SYSTOLIC BLOOD PRESSURE: 113 MMHG | DIASTOLIC BLOOD PRESSURE: 61 MMHG

## 2023-09-27 PROBLEM — N89.8 VAGINAL DISCHARGE DURING PREGNANCY IN THIRD TRIMESTER: Status: ACTIVE | Noted: 2023-09-27

## 2023-09-27 PROBLEM — O26.893 VAGINAL DISCHARGE DURING PREGNANCY IN THIRD TRIMESTER: Status: ACTIVE | Noted: 2023-09-27

## 2023-09-27 LAB — RUPTURE OF FETAL MEMBRANES BY ROM PLUS: NEGATIVE

## 2023-09-27 PROCEDURE — 59025 FETAL NON-STRESS TEST: CPT | Mod: 26 | Performed by: MIDWIFE

## 2023-09-27 PROCEDURE — 84112 EVAL AMNIOTIC FLUID PROTEIN: CPT | Performed by: MIDWIFE

## 2023-09-27 PROCEDURE — G0463 HOSPITAL OUTPT CLINIC VISIT: HCPCS

## 2023-09-27 PROCEDURE — 87653 STREP B DNA AMP PROBE: CPT | Performed by: MIDWIFE

## 2023-09-27 PROCEDURE — 99214 OFFICE O/P EST MOD 30 MIN: CPT | Mod: 25 | Performed by: MIDWIFE

## 2023-09-27 RX ORDER — PROCHLORPERAZINE MALEATE 10 MG
10 TABLET ORAL EVERY 6 HOURS PRN
Status: DISCONTINUED | OUTPATIENT
Start: 2023-09-27 | End: 2023-09-27 | Stop reason: HOSPADM

## 2023-09-27 RX ORDER — PROCHLORPERAZINE 25 MG
25 SUPPOSITORY, RECTAL RECTAL EVERY 12 HOURS PRN
Status: DISCONTINUED | OUTPATIENT
Start: 2023-09-27 | End: 2023-09-27 | Stop reason: HOSPADM

## 2023-09-27 RX ORDER — ONDANSETRON 4 MG/1
4 TABLET, ORALLY DISINTEGRATING ORAL EVERY 6 HOURS PRN
Status: DISCONTINUED | OUTPATIENT
Start: 2023-09-27 | End: 2023-09-27 | Stop reason: HOSPADM

## 2023-09-27 RX ORDER — METOCLOPRAMIDE 10 MG/1
10 TABLET ORAL EVERY 6 HOURS PRN
Status: DISCONTINUED | OUTPATIENT
Start: 2023-09-27 | End: 2023-09-27 | Stop reason: HOSPADM

## 2023-09-27 RX ORDER — METOCLOPRAMIDE HYDROCHLORIDE 5 MG/ML
10 INJECTION INTRAMUSCULAR; INTRAVENOUS EVERY 6 HOURS PRN
Status: DISCONTINUED | OUTPATIENT
Start: 2023-09-27 | End: 2023-09-27 | Stop reason: HOSPADM

## 2023-09-27 RX ORDER — ONDANSETRON 2 MG/ML
4 INJECTION INTRAMUSCULAR; INTRAVENOUS EVERY 6 HOURS PRN
Status: DISCONTINUED | OUTPATIENT
Start: 2023-09-27 | End: 2023-09-27 | Stop reason: HOSPADM

## 2023-09-27 ASSESSMENT — ACTIVITIES OF DAILY LIVING (ADL)
CONCENTRATING,_REMEMBERING_OR_MAKING_DECISIONS_DIFFICULTY: NO
ADLS_ACUITY_SCORE: 35
WALKING_OR_CLIMBING_STAIRS_DIFFICULTY: NO
ADLS_ACUITY_SCORE: 35
DRESSING/BATHING_DIFFICULTY: NO
DIFFICULTY_EATING/SWALLOWING: NO
DOING_ERRANDS_INDEPENDENTLY_DIFFICULTY: NO
TOILETING_ISSUES: NO
WEAR_GLASSES_OR_BLIND: NO
FALL_HISTORY_WITHIN_LAST_SIX_MONTHS: NO
CHANGE_IN_FUNCTIONAL_STATUS_SINCE_ONSET_OF_CURRENT_ILLNESS/INJURY: NO

## 2023-09-27 NOTE — PROVIDER NOTIFICATION
09/27/23 1801   Provider Notification   Provider Name/Title BEN Martinez CNM   Method of Notification At Bedside       GBS and ROM+ collected. BSUS shows baby is cephalic.

## 2023-09-27 NOTE — PROGRESS NOTES
HOSPITAL TRIAGE NOTE  ===================    CHIEF COMPLAINT  ========================  Cathleen Urbano is a 23 year old patient presenting today at 39w3d for evaluation of R/O ROM.    No LMP recorded. Patient is pregnant.  Estimated Date of Delivery: Oct 1, 2023     HPI  ==================   Conversation facilitated by [employer] . Accompanied by Mariposa lora, and her young daughter  Pt reports irregular contractions through the night and day. When up walking throughout the day has been experiencing some watery discharge. No large gushes. Clear in appearance, no odor, itching, or burning.      Denies fever, cough, SOB or chest pain. Denies having contact with anyone who is Covid-19 positive. Does have a slight stuffy/runny nose.     Prenatal record and labs reviewed from [facility] and ED visits, through [facility] EMR.    CONTRACTIONS: irreg and mild  ABDOMINAL PAIN: none  FETAL MOVEMENT: active    VAGINAL BLEEDING: none  RUPTURE OF MEMBRANES: uncertain, agreeable to ROMPlus  PELVIC PAIN: cramping intermittently     PREGNANCY COMPLICATIONS: limited prenatal care     # Pain Assessment:      2023     9:15 PM   Current Pain Score   Patient currently in pain? yes   Cathleen kim pain level was assessed and she currently denies pain.        REVIEW OF SYSTEMS  =====================  C: NEGATIVE for fever, chills  I: NEGATIVE for worrisome rashes, moles or lesions  E: NEGATIVE for vision changes or irritation  R: NEGATIVE for significant cough or SOB  CV: NEGATIVE for chest pain, palpitations or varicosities  GI: NEGATIVE for nausea, abdominal pain, heartburn, or change in bowel habits  : NEGATIVE for frequency, dysuria, or hematuria  M: NEGATIVE for significant arthralgias or myalgia  N: NEGATIVE for headache, weakness, dizziness or paresthesias  P: NEGATIVE for changes in mood or affect    PROBLEM LIST  ===============  Patient Active Problem List    Diagnosis Date Noted    Vaginal  discharge during pregnancy in third trimester 09/27/2023     Priority: Medium    Vitamin D deficiency 07/07/2023     Priority: Medium     7/7/23: Recommended supplementation       Language barrier 07/06/2023     Priority: Medium     Communicates with [place]        Supervision of high risk pregnancy in second trimester 06/22/2023     Priority: Medium     *moved from country- [place]*  *Dating from 24+1 US*  [facility] Patient Provider Group choice: CNM group  Partner's name: Mariusz  [x]NOB folder  [x]Dating- 24 +1 us, JERILYN completed  [] 1st trimester screening:n/a  []quad/afp: n/a  [x]Fetal anatomy US ordered- [facility] referral level 2 us placed 6/21  [x]Rubella immune  [x]Hep B immune  []Pap- need to ask, plan pp if needed  [x] No added risk for PRE-E  [x] No increased risk for GDM  []No need for utox in labor  []COVID vaccine completed  _____________________________________  []EOB folder  []PP Contraception plan: If tubal,consent date:  []Labor plans: epidural  []:  [x]Infant feeding plan breast  []FLU shot  [x]TDAP given  [x]Rhogam if needed, NA  [x]TOLAC NA  [x] Water birth interest NO  [x]GCT, passed  ________________________________________  [] OTC PP meds sent  []PP plans, time off, support system discussed, resources offered  []Planning CS-ERAS pkt        Iron deficiency anemia, unspecified iron deficiency anemia type 06/22/2023     Priority: Medium     6/5: hbg 9.9, iron sent      6/21: vit c and b12 sent   Repeat with EOB labs, future order in place  7/7/23: Hgb 10.6 at EOB. Recommend continuing with supplementation or given persistent anemia in pregnancy could consider IV Fe infusions if patient prefers. Message sent to RN team.       Limited prenatal care in second trimester 06/22/2023     Priority: Medium     Had some prenatal care in [place] prior to moving    6/21: JERILYN completed,  to assist         HISTORIES  ==============  ALLERGIES:    No Known Allergies  PAST  MEDICAL HISTORY  Past Medical History:   Diagnosis Date    No pertinent past medical history      SOCIAL HISTORY  Social History     Socioeconomic History    Marital status:      Spouse name: Mariusz    Number of children: 2    Years of education: Not on file    Highest education level: Not on file   Occupational History    Not on file   Tobacco Use    Smoking status: Never    Smokeless tobacco: Never   Substance and Sexual Activity    Alcohol use: Never    Drug use: Never    Sexual activity: Yes     Partners: Male   Other Topics Concern    Not on file   Social History Narrative    Not on file     Social Determinants of Health     Financial Resource Strain: Not on file   Food Insecurity: Not on file   Transportation Needs: Not on file   Physical Activity: Not on file   Stress: Not on file   Social Connections: Not on file   Interpersonal Safety: Not on file   Housing Stability: Not on file     PARTNER: not present    FAMILY HISTORY  Family History   Problem Relation Age of Onset    No Known Problems Mother     No Known Problems Father     No Known Problems Brother     No Known Problems Brother     No Known Problems Brother     No Known Problems Brother     No Known Problems Brother     No Known Problems Brother     No Known Problems Brother     No Known Problems Brother     No Known Problems Brother     No Known Problems Maternal Grandmother     No Known Problems Maternal Grandfather     No Known Problems Paternal Grandmother     No Known Problems Paternal Grandfather     No Known Problems Sister      OB HISTORY  OB History    Para Term  AB Living   3 2 2 0 0 2   SAB IAB Ectopic Multiple Live Births   0 0 0 0 2      # Outcome Date GA Lbr Adrian/2nd Weight Sex Delivery Anes PTL Lv   3 Current            2 Term 22   2.22 kg (4 lb 14.3 oz) F Vag-Spont   CHAPARRITA   1 Term 21    F Vag-Spont   CHAPARRITA      Obstetric Comments   Report that their first daughter's birth documents do not reflect her real  birthday, but are unsure of her actual date of birth.  They state it is close to [birth year]/01[birth year]21.       They also report that both daughters were born full term, but do not recall the actual gestation, and report that birth weight was low- Mariusz pulled up a photo of their second daughter on the scale at her birth, and it was 2.22kg.          Prenatal Labs:   Lab Results   Component Value Date    AS Negative 06/05/2023    HEPBANG Nonreactive 06/05/2023    RUQIGG Positive 06/05/2023    HGB 10.6 (L) 07/06/2023     Rubella- Immune  GBS negative in July    ULTRASOUND(s) reviewed: no level 2, but normal US at 24 weeks    EXAM  ============  /61   Temp 98.9  F (37.2  C) (Oral)   Resp 18   GENERAL APPEARANCE: healthy, alert and no distress  RESP: lungs clear to auscultation - no rales, rhonchi or wheezes  CV: regular rates and rhythm, normal S1 S2, no S3 or S4 and no murmur,and no varicosities  ABDOMEN:  soft, nontender, no epigastric pain. MINDY by Leopolds. Fundal height measuring 38cm.   SKIN: no suspicious lesions or rashes  NEURO: Denies headache, blurred vision, other vision changes  PSYCH: mentation appears normal. and affect normal/bright  MS/ LEGS: No edema    CONTRACTIONS: every 7-10 minutes   FETAL HEART TONES: continuous EFM- baseline 140 with moderate variability and positive accelerations. One possible variable deceleration, but strip was not connected.   NST: REACTIVE  EFW: 7.25 lbs    PELVIC EXAM: deferred  RAVI SCORE: NA  PRESENTATION: VERTEX by transverse abdominal US  BLOOD: no  DISCHARGE: clear    ROM: pending RomPlus  ROMPLUS: collected      DIAGNOSIS  ============  39w3d [facility] T[facility] for vaginal discharge - pending ROMPlus  NST: REACTIVE  Fetal Heart rate tracing:category one    PLAN  ============  Will await ROMPlus results  Verbal report given to oncoming CNM  Discussed recommendation for weekly PNC appts.  states that pt's  has not been willing to bring her to  appointments. Pt states she is willing to schedule GAEL.     32 minutes spent on the date of the encounter doing chart review, history and exam, documentation and further activities per the note.    JERAMY Gonzales, JERAMY Lopes CNM    Addendum 1915:  ROM plus results negative, patient to be discharge to home.  JERAMY Serrato CNM

## 2023-09-28 ENCOUNTER — PRENATAL OFFICE VISIT (OUTPATIENT)
Dept: OBGYN | Facility: CLINIC | Age: 23
End: 2023-09-28
Attending: REGISTERED NURSE
Payer: COMMERCIAL

## 2023-09-28 VITALS
DIASTOLIC BLOOD PRESSURE: 66 MMHG | HEIGHT: 62 IN | SYSTOLIC BLOOD PRESSURE: 104 MMHG | WEIGHT: 146 LBS | BODY MASS INDEX: 26.87 KG/M2 | HEART RATE: 87 BPM

## 2023-09-28 DIAGNOSIS — O09.92 SUPERVISION OF HIGH RISK PREGNANCY IN SECOND TRIMESTER: Primary | ICD-10-CM

## 2023-09-28 DIAGNOSIS — O09.32 LIMITED PRENATAL CARE IN SECOND TRIMESTER: ICD-10-CM

## 2023-09-28 DIAGNOSIS — Z60.3 LANGUAGE BARRIER: ICD-10-CM

## 2023-09-28 DIAGNOSIS — Z75.8 LANGUAGE BARRIER: ICD-10-CM

## 2023-09-28 LAB — GP B STREP DNA SPEC QL NAA+PROBE: NEGATIVE

## 2023-09-28 PROCEDURE — 99213 OFFICE O/P EST LOW 20 MIN: CPT | Performed by: REGISTERED NURSE

## 2023-09-28 PROCEDURE — G0463 HOSPITAL OUTPT CLINIC VISIT: HCPCS | Performed by: REGISTERED NURSE

## 2023-09-28 SDOH — SOCIAL STABILITY - SOCIAL INSECURITY: ACCULTURATION DIFFICULTY: Z60.3

## 2023-09-28 ASSESSMENT — PAIN SCALES - GENERAL: PAINLEVEL: NO PAIN (0)

## 2023-09-28 NOTE — PROGRESS NOTES
Subjective:     23 year old  at 39w4d presents for routine prenatal visit.             Denies *** cramping/contractions, vaginal bleeding, discharge or leakage of fluid. Reports +fetal movement.  No HA, vision changes, ruq/epigastric pain.      Patient concerns: Feeling well overall. ***    Objective:  There were no vitals filed for this visit.     See OB flowsheet    Assessment/Plan   No diagnosis found.  No orders of the defined types were placed in this encounter.    No orders of the defined types were placed in this encounter.      - Reviewed why/how to contact provider if headache/visual changes/RUQ or epigastric pain, decreased fetal movement, vaginal bleeding, leakage of fluid or strong/regular contractions.   Patient education/orders or handouts today:  {[email or web address]}    - Reviewed late term testing including BPP => 41 weeks and rationale for induction of labor based on results.   Patient desires *** induction or late term testing.      Continue scheduled prenatal care, RTC in *** weeks and prn if questions or concerns.      JERAMY Azul, CNM

## 2023-09-28 NOTE — PROGRESS NOTES
"Subjective:     23 year old  at 39w4d presents for routine prenatal visit.           Pt seen with iPad Lavish Skate . She is here with her .      Denies vaginal bleeding or leakage of fluid.  Having irregular contractions.  Feeling normal fetal movement.        No HA, visual changes, RUQ or epigastric pain.     Patient concerns:   - Cathleen has not been seen in clinic since 28 weeks due to transportation issues.   - She was seen on L&D triage yesterday for r/o ROM, workup was negative. GBS collected in hospital, result pending.   - She has not completed a level II US this pregnancy.     - Reports last 2 babies born near her due date, one was one week early.   - Completed TDAP 23, EOB labs reviewed showed passed GCT, mildly low Hgb 10.6, reports not taking Fe only taking PNV, Vit D low reports not taking supplement.   - Feeling well overall, denies further questions or concerns.       Objective:  Vitals:    23 1016   BP: 104/66   Pulse: 87   Weight: 66.2 kg (146 lb)   Height: 1.575 m (5' 2\")    See OB flowsheet    Assessment/Plan     Encounter Diagnoses   Name Primary?    Language barrier     Supervision of high risk pregnancy in second trimester Yes    Limited prenatal care in second trimester      Orders Placed This Encounter   Procedures    US OB Biophys Single Gestation Measure     No orders of the defined types were placed in this encounter.    - Reviewed postdates testing including BPP => 41 weeks and rationale for induction of labor based on results.   Patient desires postdates testing. Continue to review at next visit.     - BPP + Growth ordered today given limited prenatal care. Recommend RTC early next week if undelivered.   - Pt to  refill of Vit D, Fe supplements after visit today.     - Reviewed why/how to contact provider if headache/visual changes/RUQ or epigastric pain, decreased fetal movement, vaginal bleeding, leakage of fluid or strong/regular contractions.   Patient " education/orders or handouts today:  Sign/symptoms of labor, When to call for labor or other concerns, Postdates testing discussion, and BPP    Return to clinic in 1 week and prn if questions or concerns.   Sarah Connolly, JERAMY LARAM

## 2023-09-28 NOTE — DISCHARGE INSTRUCTIONS
Discharge Instruction for Undelivered Patients      You were seen for:  Rule out rupture of membranes, rule out labor  We Consulted: Midwife team  You had (Test or Medicine): ROM+, GBS    Diet:   Drink 8 to 12 glasses of liquids (milk, juice, water) every day.     Activity:  Call your doctor or nurse midwife if your baby is moving less than usual.     Call your provider if you notice:  Swelling in your face or increased swelling in your hands or legs.  Headaches that are not relieved by Tylenol (acetaminophen).  Changes in your vision (blurring: seeing spots or stars.)  Nausea (sick to your stomach) and vomiting (throwing up).   Weight gain of 5 pounds or more per week.  Heartburn that doesn't go away.  Signs of bladder infection: pain when you urinate (use the toilet), need to go more often and more urgently.  The bag of cotto (rupture of membranes) breaks, or you notice leaking in your underwear.  Bright red blood in your underwear.  Abdominal (lower belly) or stomach pain.  For first baby: Contractions (tightening) less than 5 minutes apart for one hour or more.  Second (plus) baby: Contractions (tightening) less than 10 minutes apart and getting stronger.  *If less than 34 weeks: Contractions (tightening) more than 6 times in one hour.  Increase or change in vaginal discharge (note the color and amount)      Follow-up:  As scheduled in the clinic

## 2023-09-28 NOTE — PROGRESS NOTES
Patient discharged ambulatory at 1955. RN reviewed education via  with patient and patient is aware to come in if she notices LOF or an increase in contractions, patient feels no current change in her contractions and prefers to be discharged.

## 2023-09-28 NOTE — PROGRESS NOTES
Data: [facility] at 1651.   Reason for maternal/fetal assessment per patient is Rule Out Labor (Pt reports leaking of clear fluid and intermittent contractions since 2200.) and Rule out rupture of membranes  . Patient is a . Prenatal record reviewed.      OB History    Para Term  AB Living   3 2 2 0 0 2   SAB IAB Ectopic Multiple Live Births   0 0 0 0 2      # Outcome Date GA Lbr Adrian/2nd Weight Sex Delivery Anes PTL Lv   3 Current            2 Term 22   2.22 kg (4 lb 14.3 oz) F Vag-Spont   CHAPARRITA   1 Term 21    F Vag-Spont   CHAPARRITA      Obstetric Comments   Report that their first daughter's birth documents do not reflect her real birthday, but are unsure of her actual date of birth.  They state it is close to 21.       They also report that both daughters were born full term, but do not recall the actual gestation, and report that birth weight was low- Mariusz pulled up a photo of their second daughter on the scale at her birth, and it was 2.22kg.           Medical History:   Past Medical History:   Diagnosis Date    No pertinent past medical history    . Gestational Age 39w3d. VSS. Cervix: not examined.  Fetal movement present. Patient denies cramping, backache, vaginal discharge, pelvic pressure, UTI symptoms, GI problems, bloody show, vaginal bleeding, edema, headache, visual disturbances, epigastric or URQ pain, abdominal pain, rupture of membranes.  present.  Action: Verbal consent for EFM. Triage assessment completed. EFM applied. Fetal assessment: Presumed adequate fetal oxygenation documented (see flow record). Patient education pamphlets given on fetal movement and when to return. Patient instructed to report change in fetal movement, vaginal leaking of fluid or bleeding, abdominal pain, or any concerns related to the pregnancy to her nurse/physician.   Response: Mackenzie Martinez CNM informed of arrival. Lab obtained and ROM+ returned negative.  Patient verbalized understanding of education and verbalized agreement with plan. Report given to oncoming nurse who will discharge the patient.

## 2023-09-30 ENCOUNTER — HOSPITAL ENCOUNTER (OUTPATIENT)
Facility: CLINIC | Age: 23
Discharge: HOME | End: 2023-10-01
Attending: MIDWIFE | Admitting: MIDWIFE
Payer: COMMERCIAL

## 2023-09-30 LAB
CLUE CELLS: ABNORMAL
RUPTURE OF FETAL MEMBRANES BY ROM PLUS: NEGATIVE
TRICHOMONAS, WET PREP: ABNORMAL
WBC'S/HIGH POWER FIELD, WET PREP: ABNORMAL
YEAST, WET PREP: ABNORMAL

## 2023-09-30 PROCEDURE — 87210 SMEAR WET MOUNT SALINE/INK: CPT | Performed by: MIDWIFE

## 2023-09-30 PROCEDURE — 84112 EVAL AMNIOTIC FLUID PROTEIN: CPT | Performed by: MIDWIFE

## 2023-09-30 PROCEDURE — 99214 OFFICE O/P EST MOD 30 MIN: CPT | Mod: 25 | Performed by: MIDWIFE

## 2023-09-30 PROCEDURE — G0463 HOSPITAL OUTPT CLINIC VISIT: HCPCS

## 2023-09-30 RX ORDER — METOCLOPRAMIDE HYDROCHLORIDE 5 MG/ML
10 INJECTION INTRAMUSCULAR; INTRAVENOUS EVERY 6 HOURS PRN
Status: DISCONTINUED | OUTPATIENT
Start: 2023-09-30 | End: 2023-10-01 | Stop reason: HOSPADM

## 2023-09-30 RX ORDER — ONDANSETRON 2 MG/ML
4 INJECTION INTRAMUSCULAR; INTRAVENOUS EVERY 6 HOURS PRN
Status: DISCONTINUED | OUTPATIENT
Start: 2023-09-30 | End: 2023-10-01 | Stop reason: HOSPADM

## 2023-09-30 RX ORDER — ONDANSETRON 4 MG/1
4 TABLET, ORALLY DISINTEGRATING ORAL EVERY 6 HOURS PRN
Status: DISCONTINUED | OUTPATIENT
Start: 2023-09-30 | End: 2023-10-01 | Stop reason: HOSPADM

## 2023-09-30 RX ORDER — PROCHLORPERAZINE 25 MG
25 SUPPOSITORY, RECTAL RECTAL EVERY 12 HOURS PRN
Status: DISCONTINUED | OUTPATIENT
Start: 2023-09-30 | End: 2023-10-01 | Stop reason: HOSPADM

## 2023-09-30 RX ORDER — PROCHLORPERAZINE MALEATE 10 MG
10 TABLET ORAL EVERY 6 HOURS PRN
Status: DISCONTINUED | OUTPATIENT
Start: 2023-09-30 | End: 2023-10-01 | Stop reason: HOSPADM

## 2023-09-30 RX ORDER — METOCLOPRAMIDE 10 MG/1
10 TABLET ORAL EVERY 6 HOURS PRN
Status: DISCONTINUED | OUTPATIENT
Start: 2023-09-30 | End: 2023-10-01 | Stop reason: HOSPADM

## 2023-09-30 ASSESSMENT — ACTIVITIES OF DAILY LIVING (ADL): ADLS_ACUITY_SCORE: 35

## 2023-10-01 ENCOUNTER — ANESTHESIA (OUTPATIENT)
Dept: OBGYN | Facility: CLINIC | Age: 23
End: 2023-10-01
Payer: COMMERCIAL

## 2023-10-01 ENCOUNTER — ANESTHESIA EVENT (OUTPATIENT)
Dept: OBGYN | Facility: CLINIC | Age: 23
End: 2023-10-01
Payer: COMMERCIAL

## 2023-10-01 ENCOUNTER — HOSPITAL ENCOUNTER (INPATIENT)
Facility: CLINIC | Age: 23
LOS: 1 days | Discharge: HOME HEALTH | End: 2023-10-02
Attending: MIDWIFE | Admitting: MIDWIFE
Payer: COMMERCIAL

## 2023-10-01 VITALS
HEART RATE: 83 BPM | SYSTOLIC BLOOD PRESSURE: 108 MMHG | TEMPERATURE: 98.1 F | RESPIRATION RATE: 18 BRPM | DIASTOLIC BLOOD PRESSURE: 60 MMHG

## 2023-10-01 DIAGNOSIS — Z34.90 INTRAUTERINE PREGNANCY: ICD-10-CM

## 2023-10-01 DIAGNOSIS — O09.92 SUPERVISION OF HIGH RISK PREGNANCY IN SECOND TRIMESTER: ICD-10-CM

## 2023-10-01 PROBLEM — Z36.89 ENCOUNTER FOR TRIAGE IN PREGNANT PATIENT: Status: ACTIVE | Noted: 2023-10-01

## 2023-10-01 LAB
ABO/RH(D): NORMAL
ANTIBODY SCREEN: NEGATIVE
ERYTHROCYTE [DISTWIDTH] IN BLOOD BY AUTOMATED COUNT: 14.2 % (ref 10–15)
ERYTHROCYTE [DISTWIDTH] IN BLOOD BY AUTOMATED COUNT: 14.4 % (ref 10–15)
HCT VFR BLD AUTO: 25.8 % (ref 35–47)
HCT VFR BLD AUTO: 30.3 % (ref 35–47)
HGB BLD-MCNC: 8.2 G/DL (ref 11.7–15.7)
HGB BLD-MCNC: 9.8 G/DL (ref 11.7–15.7)
MCH RBC QN AUTO: 26.6 PG (ref 26.5–33)
MCH RBC QN AUTO: 26.9 PG (ref 26.5–33)
MCHC RBC AUTO-ENTMCNC: 31.8 G/DL (ref 31.5–36.5)
MCHC RBC AUTO-ENTMCNC: 32.3 G/DL (ref 31.5–36.5)
MCV RBC AUTO: 83 FL (ref 78–100)
MCV RBC AUTO: 84 FL (ref 78–100)
PLATELET # BLD AUTO: 175 10E3/UL (ref 150–450)
PLATELET # BLD AUTO: 193 10E3/UL (ref 150–450)
RBC # BLD AUTO: 3.08 10E6/UL (ref 3.8–5.2)
RBC # BLD AUTO: 3.64 10E6/UL (ref 3.8–5.2)
RUPTURE OF FETAL MEMBRANES BY ROM PLUS: POSITIVE
SARS-COV-2 RNA RESP QL NAA+PROBE: NEGATIVE
SPECIMEN EXPIRATION DATE: NORMAL
WBC # BLD AUTO: 13.1 10E3/UL (ref 4–11)
WBC # BLD AUTO: 14.7 10E3/UL (ref 4–11)

## 2023-10-01 PROCEDURE — 250N000011 HC RX IP 250 OP 636: Performed by: ADVANCED PRACTICE MIDWIFE

## 2023-10-01 PROCEDURE — C9803 HOPD COVID-19 SPEC COLLECT: HCPCS

## 2023-10-01 PROCEDURE — 722N000001 HC LABOR CARE VAGINAL DELIVERY SINGLE

## 2023-10-01 PROCEDURE — 84112 EVAL AMNIOTIC FLUID PROTEIN: CPT | Performed by: MIDWIFE

## 2023-10-01 PROCEDURE — 86780 TREPONEMA PALLIDUM: CPT | Performed by: MIDWIFE

## 2023-10-01 PROCEDURE — 86850 RBC ANTIBODY SCREEN: CPT | Performed by: MIDWIFE

## 2023-10-01 PROCEDURE — 258N000003 HC RX IP 258 OP 636: Performed by: MIDWIFE

## 2023-10-01 PROCEDURE — 3E0R3BZ INTRODUCTION OF ANESTHETIC AGENT INTO SPINAL CANAL, PERCUTANEOUS APPROACH: ICD-10-PCS | Performed by: ANESTHESIOLOGY

## 2023-10-01 PROCEDURE — 36415 COLL VENOUS BLD VENIPUNCTURE: CPT | Performed by: ADVANCED PRACTICE MIDWIFE

## 2023-10-01 PROCEDURE — 120N000002 HC R&B MED SURG/OB UMMC

## 2023-10-01 PROCEDURE — 59025 FETAL NON-STRESS TEST: CPT | Mod: 26 | Performed by: MIDWIFE

## 2023-10-01 PROCEDURE — 87581 M.PNEUMON DNA AMP PROBE: CPT | Performed by: ADVANCED PRACTICE MIDWIFE

## 2023-10-01 PROCEDURE — 85027 COMPLETE CBC AUTOMATED: CPT | Performed by: ADVANCED PRACTICE MIDWIFE

## 2023-10-01 PROCEDURE — 250N000009 HC RX 250: Performed by: MIDWIFE

## 2023-10-01 PROCEDURE — 85027 COMPLETE CBC AUTOMATED: CPT | Performed by: MIDWIFE

## 2023-10-01 PROCEDURE — 250N000011 HC RX IP 250 OP 636: Performed by: MIDWIFE

## 2023-10-01 PROCEDURE — 59410 OBSTETRICAL CARE: CPT | Performed by: MIDWIFE

## 2023-10-01 PROCEDURE — 86901 BLOOD TYPING SEROLOGIC RH(D): CPT | Performed by: MIDWIFE

## 2023-10-01 PROCEDURE — 250N000013 HC RX MED GY IP 250 OP 250 PS 637: Performed by: MIDWIFE

## 2023-10-01 PROCEDURE — 36415 COLL VENOUS BLD VENIPUNCTURE: CPT | Performed by: MIDWIFE

## 2023-10-01 PROCEDURE — 00HU33Z INSERTION OF INFUSION DEVICE INTO SPINAL CANAL, PERCUTANEOUS APPROACH: ICD-10-PCS | Performed by: ANESTHESIOLOGY

## 2023-10-01 PROCEDURE — 370N000003 HC ANESTHESIA WARD SERVICE: Performed by: ANESTHESIOLOGY

## 2023-10-01 PROCEDURE — 87635 SARS-COV-2 COVID-19 AMP PRB: CPT | Performed by: ADVANCED PRACTICE MIDWIFE

## 2023-10-01 PROCEDURE — 82565 ASSAY OF CREATININE: CPT | Performed by: MIDWIFE

## 2023-10-01 PROCEDURE — 250N000011 HC RX IP 250 OP 636: Performed by: STUDENT IN AN ORGANIZED HEALTH CARE EDUCATION/TRAINING PROGRAM

## 2023-10-01 RX ORDER — ONDANSETRON 4 MG/1
4 TABLET, ORALLY DISINTEGRATING ORAL EVERY 6 HOURS PRN
Status: DISCONTINUED | OUTPATIENT
Start: 2023-10-01 | End: 2023-10-01

## 2023-10-01 RX ORDER — METOCLOPRAMIDE 10 MG/1
10 TABLET ORAL EVERY 6 HOURS PRN
Status: DISCONTINUED | OUTPATIENT
Start: 2023-10-01 | End: 2023-10-01

## 2023-10-01 RX ORDER — CLINDAMYCIN PHOSPHATE 900 MG/50ML
900 INJECTION, SOLUTION INTRAVENOUS EVERY 8 HOURS
Status: DISCONTINUED | OUTPATIENT
Start: 2023-10-01 | End: 2023-10-02 | Stop reason: HOSPADM

## 2023-10-01 RX ORDER — MISOPROSTOL 200 UG/1
TABLET ORAL
Status: DISCONTINUED
Start: 2023-10-01 | End: 2023-10-01 | Stop reason: HOSPADM

## 2023-10-01 RX ORDER — PROCHLORPERAZINE MALEATE 10 MG
10 TABLET ORAL EVERY 6 HOURS PRN
Status: DISCONTINUED | OUTPATIENT
Start: 2023-10-01 | End: 2023-10-01

## 2023-10-01 RX ORDER — OXYTOCIN/0.9 % SODIUM CHLORIDE 30/500 ML
PLASTIC BAG, INJECTION (ML) INTRAVENOUS
Status: DISCONTINUED
Start: 2023-10-01 | End: 2023-10-01 | Stop reason: HOSPADM

## 2023-10-01 RX ORDER — FENTANYL/ROPIVACAINE/NS/PF 2MCG/ML-.1
PLASTIC BAG, INJECTION (ML) EPIDURAL
Status: DISCONTINUED
Start: 2023-10-01 | End: 2023-10-01 | Stop reason: HOSPADM

## 2023-10-01 RX ORDER — LIDOCAINE 40 MG/G
CREAM TOPICAL
Status: DISCONTINUED | OUTPATIENT
Start: 2023-10-01 | End: 2023-10-01

## 2023-10-01 RX ORDER — HYDROCORTISONE 25 MG/G
CREAM TOPICAL 3 TIMES DAILY PRN
Status: DISCONTINUED | OUTPATIENT
Start: 2023-10-01 | End: 2023-10-02 | Stop reason: HOSPADM

## 2023-10-01 RX ORDER — METOCLOPRAMIDE HYDROCHLORIDE 5 MG/ML
10 INJECTION INTRAMUSCULAR; INTRAVENOUS EVERY 6 HOURS PRN
Status: DISCONTINUED | OUTPATIENT
Start: 2023-10-01 | End: 2023-10-01

## 2023-10-01 RX ORDER — LIDOCAINE HYDROCHLORIDE 10 MG/ML
INJECTION, SOLUTION EPIDURAL; INFILTRATION; INTRACAUDAL; PERINEURAL
Status: DISCONTINUED
Start: 2023-10-01 | End: 2023-10-01 | Stop reason: WASHOUT

## 2023-10-01 RX ORDER — OXYTOCIN 10 [USP'U]/ML
10 INJECTION, SOLUTION INTRAMUSCULAR; INTRAVENOUS
Status: DISCONTINUED | OUTPATIENT
Start: 2023-10-01 | End: 2023-10-01

## 2023-10-01 RX ORDER — LIDOCAINE 40 MG/G
CREAM TOPICAL
Status: DISCONTINUED | OUTPATIENT
Start: 2023-10-01 | End: 2023-10-01 | Stop reason: HOSPADM

## 2023-10-01 RX ORDER — KETOROLAC TROMETHAMINE 30 MG/ML
30 INJECTION, SOLUTION INTRAMUSCULAR; INTRAVENOUS
Status: DISCONTINUED | OUTPATIENT
Start: 2023-10-01 | End: 2023-10-02 | Stop reason: HOSPADM

## 2023-10-01 RX ORDER — METHYLERGONOVINE MALEATE 0.2 MG/ML
200 INJECTION INTRAVENOUS
Status: DISCONTINUED | OUTPATIENT
Start: 2023-10-01 | End: 2023-10-02 | Stop reason: HOSPADM

## 2023-10-01 RX ORDER — FENTANYL CITRATE 50 UG/ML
100 INJECTION, SOLUTION INTRAMUSCULAR; INTRAVENOUS
Status: DISCONTINUED | OUTPATIENT
Start: 2023-10-01 | End: 2023-10-01

## 2023-10-01 RX ORDER — OXYTOCIN 10 [USP'U]/ML
10 INJECTION, SOLUTION INTRAMUSCULAR; INTRAVENOUS
Status: DISCONTINUED | OUTPATIENT
Start: 2023-10-01 | End: 2023-10-02 | Stop reason: HOSPADM

## 2023-10-01 RX ORDER — FENTANYL CITRATE-0.9 % NACL/PF 10 MCG/ML
PLASTIC BAG, INJECTION (ML) INTRAVENOUS
Status: DISCONTINUED
Start: 2023-10-01 | End: 2023-10-01 | Stop reason: WASHOUT

## 2023-10-01 RX ORDER — MISOPROSTOL 200 UG/1
800 TABLET ORAL
Status: DISCONTINUED | OUTPATIENT
Start: 2023-10-01 | End: 2023-10-02 | Stop reason: HOSPADM

## 2023-10-01 RX ORDER — MISOPROSTOL 200 UG/1
800 TABLET ORAL
Status: DISCONTINUED | OUTPATIENT
Start: 2023-10-01 | End: 2023-10-01

## 2023-10-01 RX ORDER — NALOXONE HYDROCHLORIDE 0.4 MG/ML
0.2 INJECTION, SOLUTION INTRAMUSCULAR; INTRAVENOUS; SUBCUTANEOUS
Status: DISCONTINUED | OUTPATIENT
Start: 2023-10-01 | End: 2023-10-01

## 2023-10-01 RX ORDER — OXYTOCIN 10 [USP'U]/ML
INJECTION, SOLUTION INTRAMUSCULAR; INTRAVENOUS
Status: DISCONTINUED
Start: 2023-10-01 | End: 2023-10-01 | Stop reason: WASHOUT

## 2023-10-01 RX ORDER — OXYTOCIN/0.9 % SODIUM CHLORIDE 30/500 ML
340 PLASTIC BAG, INJECTION (ML) INTRAVENOUS CONTINUOUS PRN
Status: DISCONTINUED | OUTPATIENT
Start: 2023-10-01 | End: 2023-10-01

## 2023-10-01 RX ORDER — MISOPROSTOL 200 UG/1
400 TABLET ORAL
Status: DISCONTINUED | OUTPATIENT
Start: 2023-10-01 | End: 2023-10-02 | Stop reason: HOSPADM

## 2023-10-01 RX ORDER — CARBOPROST TROMETHAMINE 250 UG/ML
250 INJECTION, SOLUTION INTRAMUSCULAR
Status: DISCONTINUED | OUTPATIENT
Start: 2023-10-01 | End: 2023-10-01

## 2023-10-01 RX ORDER — NALOXONE HYDROCHLORIDE 0.4 MG/ML
0.4 INJECTION, SOLUTION INTRAMUSCULAR; INTRAVENOUS; SUBCUTANEOUS
Status: DISCONTINUED | OUTPATIENT
Start: 2023-10-01 | End: 2023-10-01

## 2023-10-01 RX ORDER — MODIFIED LANOLIN
OINTMENT (GRAM) TOPICAL
Status: DISCONTINUED | OUTPATIENT
Start: 2023-10-01 | End: 2023-10-02 | Stop reason: HOSPADM

## 2023-10-01 RX ORDER — FENTANYL/ROPIVACAINE/NS/PF 2MCG/ML-.1
PLASTIC BAG, INJECTION (ML) EPIDURAL
Status: DISCONTINUED | OUTPATIENT
Start: 2023-10-01 | End: 2023-10-01 | Stop reason: HOSPADM

## 2023-10-01 RX ORDER — METHYLERGONOVINE MALEATE 0.2 MG/ML
200 INJECTION INTRAVENOUS
Status: DISCONTINUED | OUTPATIENT
Start: 2023-10-01 | End: 2023-10-01

## 2023-10-01 RX ORDER — IBUPROFEN 800 MG/1
800 TABLET, FILM COATED ORAL EVERY 6 HOURS PRN
Status: DISCONTINUED | OUTPATIENT
Start: 2023-10-01 | End: 2023-10-02 | Stop reason: HOSPADM

## 2023-10-01 RX ORDER — OXYTOCIN/0.9 % SODIUM CHLORIDE 30/500 ML
100-340 PLASTIC BAG, INJECTION (ML) INTRAVENOUS CONTINUOUS PRN
Status: DISCONTINUED | OUTPATIENT
Start: 2023-10-01 | End: 2023-10-01

## 2023-10-01 RX ORDER — ACETAMINOPHEN 325 MG/1
650 TABLET ORAL EVERY 4 HOURS PRN
Status: DISCONTINUED | OUTPATIENT
Start: 2023-10-01 | End: 2023-10-02 | Stop reason: HOSPADM

## 2023-10-01 RX ORDER — MISOPROSTOL 200 UG/1
400 TABLET ORAL
Status: DISCONTINUED | OUTPATIENT
Start: 2023-10-01 | End: 2023-10-01

## 2023-10-01 RX ORDER — TRANEXAMIC ACID 10 MG/ML
1 INJECTION, SOLUTION INTRAVENOUS EVERY 30 MIN PRN
Status: DISCONTINUED | OUTPATIENT
Start: 2023-10-01 | End: 2023-10-01

## 2023-10-01 RX ORDER — NALBUPHINE HYDROCHLORIDE 20 MG/ML
2.5-5 INJECTION, SOLUTION INTRAMUSCULAR; INTRAVENOUS; SUBCUTANEOUS EVERY 6 HOURS PRN
Status: DISCONTINUED | OUTPATIENT
Start: 2023-10-01 | End: 2023-10-02 | Stop reason: HOSPADM

## 2023-10-01 RX ORDER — CARBOPROST TROMETHAMINE 250 UG/ML
250 INJECTION, SOLUTION INTRAMUSCULAR
Status: DISCONTINUED | OUTPATIENT
Start: 2023-10-01 | End: 2023-10-02 | Stop reason: HOSPADM

## 2023-10-01 RX ORDER — ONDANSETRON 2 MG/ML
4 INJECTION INTRAMUSCULAR; INTRAVENOUS EVERY 6 HOURS PRN
Status: DISCONTINUED | OUTPATIENT
Start: 2023-10-01 | End: 2023-10-01

## 2023-10-01 RX ORDER — IBUPROFEN 800 MG/1
800 TABLET, FILM COATED ORAL
Status: DISCONTINUED | OUTPATIENT
Start: 2023-10-01 | End: 2023-10-02 | Stop reason: HOSPADM

## 2023-10-01 RX ORDER — PROCHLORPERAZINE 25 MG
25 SUPPOSITORY, RECTAL RECTAL EVERY 12 HOURS PRN
Status: DISCONTINUED | OUTPATIENT
Start: 2023-10-01 | End: 2023-10-01

## 2023-10-01 RX ORDER — TRANEXAMIC ACID 10 MG/ML
1 INJECTION, SOLUTION INTRAVENOUS EVERY 30 MIN PRN
Status: DISCONTINUED | OUTPATIENT
Start: 2023-10-01 | End: 2023-10-02 | Stop reason: HOSPADM

## 2023-10-01 RX ORDER — CITRIC ACID/SODIUM CITRATE 334-500MG
30 SOLUTION, ORAL ORAL
Status: DISCONTINUED | OUTPATIENT
Start: 2023-10-01 | End: 2023-10-01

## 2023-10-01 RX ORDER — ACETAMINOPHEN 325 MG/1
650 TABLET ORAL EVERY 4 HOURS PRN
Status: DISCONTINUED | OUTPATIENT
Start: 2023-10-01 | End: 2023-10-01

## 2023-10-01 RX ORDER — DOCUSATE SODIUM 100 MG/1
100 CAPSULE, LIQUID FILLED ORAL DAILY
Status: DISCONTINUED | OUTPATIENT
Start: 2023-10-01 | End: 2023-10-02 | Stop reason: HOSPADM

## 2023-10-01 RX ORDER — OXYTOCIN/0.9 % SODIUM CHLORIDE 30/500 ML
340 PLASTIC BAG, INJECTION (ML) INTRAVENOUS CONTINUOUS PRN
Status: DISCONTINUED | OUTPATIENT
Start: 2023-10-01 | End: 2023-10-02 | Stop reason: HOSPADM

## 2023-10-01 RX ORDER — FENTANYL CITRATE-0.9 % NACL/PF 10 MCG/ML
100 PLASTIC BAG, INJECTION (ML) INTRAVENOUS EVERY 5 MIN PRN
Status: DISCONTINUED | OUTPATIENT
Start: 2023-10-01 | End: 2023-10-01 | Stop reason: HOSPADM

## 2023-10-01 RX ORDER — BISACODYL 10 MG
10 SUPPOSITORY, RECTAL RECTAL DAILY PRN
Status: DISCONTINUED | OUTPATIENT
Start: 2023-10-01 | End: 2023-10-02 | Stop reason: HOSPADM

## 2023-10-01 RX ADMIN — Medication 340 ML/HR: at 15:40

## 2023-10-01 RX ADMIN — Medication 2 ML: at 14:34

## 2023-10-01 RX ADMIN — CLINDAMYCIN PHOSPHATE 900 MG: 900 INJECTION, SOLUTION INTRAVENOUS at 22:13

## 2023-10-01 RX ADMIN — MISOPROSTOL 400 MCG: 200 TABLET ORAL at 15:49

## 2023-10-01 RX ADMIN — SODIUM CHLORIDE, POTASSIUM CHLORIDE, SODIUM LACTATE AND CALCIUM CHLORIDE 1000 ML: 600; 310; 30; 20 INJECTION, SOLUTION INTRAVENOUS at 14:00

## 2023-10-01 RX ADMIN — Medication 5 ML: at 14:25

## 2023-10-01 RX ADMIN — METHYLERGONOVINE MALEATE 200 MCG: 0.2 INJECTION INTRAVENOUS at 15:44

## 2023-10-01 RX ADMIN — ACETAMINOPHEN 650 MG: 325 TABLET, FILM COATED ORAL at 21:27

## 2023-10-01 RX ADMIN — KETOROLAC TROMETHAMINE 30 MG: 30 INJECTION, SOLUTION INTRAMUSCULAR at 17:51

## 2023-10-01 ASSESSMENT — ACTIVITIES OF DAILY LIVING (ADL)
WEAR_GLASSES_OR_BLIND: NO
FALL_HISTORY_WITHIN_LAST_SIX_MONTHS: NO
DRESSING/BATHING_DIFFICULTY: NO
DOING_ERRANDS_INDEPENDENTLY_DIFFICULTY: NO
ADLS_ACUITY_SCORE: 18
ADLS_ACUITY_SCORE: 18
TOILETING_ISSUES: NO
ADLS_ACUITY_SCORE: 35
WALKING_OR_CLIMBING_STAIRS_DIFFICULTY: NO
ADLS_ACUITY_SCORE: 18
DIFFICULTY_EATING/SWALLOWING: NO
ADLS_ACUITY_SCORE: 18
CONCENTRATING,_REMEMBERING_OR_MAKING_DECISIONS_DIFFICULTY: NO
CHANGE_IN_FUNCTIONAL_STATUS_SINCE_ONSET_OF_CURRENT_ILLNESS/INJURY: NO
ADLS_ACUITY_SCORE: 18

## 2023-10-01 NOTE — ANESTHESIA PREPROCEDURE EVALUATION
Anesthesia Pre-Procedure Evaluation    Patient: Cathleen Urbano   MRN: [id number] : 2000        Procedure :           Past Medical History:   Diagnosis Date    No pertinent past medical history       No past surgical history on file.   No Known Allergies   Social History     Tobacco Use    Smoking status: Never    Smokeless tobacco: Never   Substance Use Topics    Alcohol use: Never      Wt Readings from Last 1 Encounters:   23 66.2 kg (146 lb)        Anesthesia Evaluation            ROS/MED HX  ENT/Pulmonary:    (-) asthma   Neurologic:       Cardiovascular:       METS/Exercise Tolerance:     Hematologic:       Musculoskeletal:       GI/Hepatic:       Renal/Genitourinary:       Endo:       Psychiatric/Substance Use:       Infectious Disease:       Malignancy:       Other:   24yo  at 40w0d  Admission: spon labor s/p SROM  PMHx:  x2  Labs: Plt 238             Physical Exam    Airway        Mallampati: II   TM distance: > 3 FB   Neck ROM: full   Mouth opening: > 3 cm    Respiratory Devices and Support         Dental  no notable dental history         Cardiovascular   cardiovascular exam normal          Pulmonary   pulmonary exam normal                OUTSIDE LABS:  CBC:   Lab Results   Component Value Date    WBC 13.1 (H) 10/01/2023    WBC 9.0 2023    HGB 9.8 (L) 10/01/2023    HGB 10.6 (L) 2023    HCT 30.3 (L) 10/01/2023    HCT 31.6 (L) 2023     10/01/2023     2023     BMP: No results found for: NA, POTASSIUM, CHLORIDE, CO2, BUN, CR, GLC  COAGS:   Lab Results   Component Value Date    PTT 27 2023    INR 0.99 2023    FIBR 417 2023     POC: No results found for: BGM, HCG, HCGS  HEPATIC: No results found for: ALBUMIN, PROTTOTAL, ALT, AST, GGT, ALKPHOS, BILITOTAL, BILIDIRECT, CATHERINE  OTHER: No results found for: PH, LACT, A1C, KAYLA, PHOS, MAG, LIPASE, AMYLASE, TSH, T4, T3, CRP, SED    Anesthesia Plan    ASA Status:  2       Anesthesia Type:  Epidural.              Consents    Anesthesia Plan(s) and associated risks, benefits, and realistic alternatives discussed. Questions answered and patient/representative(s) expressed understanding.     - Discussed:     - Discussed with:  Patient            Postoperative Care            Comments:                Julio Christensen MD

## 2023-10-01 NOTE — PROGRESS NOTES
Patient arrived to [facility] unit via wheelchair at 1800 ,with belongings, accompanied by spouse/ significant other, with infant in arms. Received report from  Meagan  and checked bands. Unit and room orientation completd. Call light given and within arms reach; no concerns present at this time. Continue with plan of care.

## 2023-10-01 NOTE — PLAN OF CARE
Data: [facility] at 2140.   Reason for maternal/fetal assessment per patient is No chief complaint on file.  . Patient is a . Prenatal record reviewed.      OB History    Para Term  AB Living   3 2 2 0 0 2   SAB IAB Ectopic Multiple Live Births   0 0 0 0 2      # Outcome Date GA Lbr Adrian/2nd Weight Sex Delivery Anes PTL Lv   3 Current            2 Term 22   2.22 kg (4 lb 14.3 oz) F Vag-Spont   CHAPARRITA   1 Term 21    F Vag-Spont   CHAPARRITA      Obstetric Comments   Report that their first daughter's birth documents do not reflect her real birthday, but are unsure of her actual date of birth.  They state it is close to 21.       They also report that both daughters were born full term, but do not recall the actual gestation, and report that birth weight was low- Mariusz pulled up a photo of their second daughter on the scale at her birth, and it was 2.22kg.           Medical History:   Past Medical History:   Diagnosis Date    No pertinent past medical history    . Gestational Age 40w0d. VSS. Cervix: dilated to 3.  Fetal movement present. Patient is reporting some vaginal discharge with occasional contractions that been more intense this afternoon. Patient denies pelvic pressure, UTI symptoms, GI problems, bloody show, vaginal bleeding, edema, headache, visual disturbances, epigastric or URQ pain, abdominal pain, rupture of membranes. Support person is present.  Action: Verbal consent for EFM. Triage assessment completed. EFM applied for fetal assessment. Uterine assessment: shows contractions every 4-10 minutes. Fetal assessment: Presumed adequate fetal oxygenation documented (see flow record). Patient education pamphlets given on fetal movement counts. Patient instructed to report change in fetal movement, vaginal leaking of fluid or bleeding, abdominal pain, or any concerns related to the pregnancy to her nurse/physician.   Response: Mackenzie Martinez CNM was informed  of patient's arrival. Plan per provider is to discharge patient home due to negative ROM+ test and contractions being irregular. Patient verbalized understanding of education and verbalized agreement with plan. Discharged ambulatory at 0010.

## 2023-10-01 NOTE — L&D DELIVERY NOTE
Delivery Summary    Cathleen Urbano MRN# [id number]   Age: 23 year old YOB: 2000   Delivery Note    Brief course of labor:pt presented at 1235 reporting onset of labor and SROM at 11 am    Progressed well had epidural at 6 cm for pain relief     Pt became complete at 1532 and started pushing . Delivered a vigorous baby boy  who was immediately dried per pt request not placed on abdomen until in a warm blanket . Umbilical cord was double clamped and cut  after the cord stopped pulsing.  Placenta spontaneously delivered intact without difficulty via Shultze mechanism. Intact perineum .  IV pitocin opened after delivery of baby  noted 300 ml blood loss after delivery of placenta pt received IM methergine and buccal miso per protocol    Fundus is firm  and midline.  Mom and baby are stable.      IUP at 40 weeks gestation delivered on 2023.     delivery of a viable Male infant.  Weight : pending  Apgars of 8 at 1 minute and 9 at 5 minutes.  Labor was spontaneous.  Medications administered  in labor:  Pain Rx Epidural; Antibiotics No;  Perineum: Intact  Placenta-mechanism: spontaneous, intact,  with a 3 vessel cord. IV oxytocin was given.  QBL was 575.  Anticipated Discharge Date: 10/2/23   Complications of pregnancy, labor and delivery: None during labor or birth  lapse in prenatal care late to care dating by 24 wk    Birth attendants:JERAMY Cruz CNM, LAURENT DESHPANDE  ASSESSMENT & PLAN: routine PP        Willy Urbano-Cathleen [[id number]]      Labor Event Times      Latent labor onset date/time: 10/1/2023 1100    Active labor onset date: 10/1/23 Onset time:  1:00 PM CDT   Dilation complete date: 10/1/23 Complete time:  3:32 PM   Start pushing date/time: 10/1/2023 1532          Labor Events     labor?: No   steroids: None  Labor Type: Spontaneous     Antibiotics received during labor?: No       Rupture date/time: 10/1/23 1100   Rupture type: Spontaneous Rupture of  Membranes  Fluid color: Clear  Fluid odor: Normal     Augmentation: None       Delivery/Placenta Date and Time      Delivery Date: 10/1/23 Delivery Time:  3:35 PM   Placenta Date/Time: 10/1/2023  3:41 PM  Oxytocin given at the time of delivery: after delivery of baby  Delivering clinician: Марина Hill APRN CNM   Other personnel present at delivery:  Provider Role   Thee Raines Hannah, RN Delivery Nurse             Vaginal Counts       Initial count performed by 2 team members:  Two Team Members   Patience Ramirez RN         Needles Suture Needles Sponges (RETIRED) Instruments   Initial counts 2 0 5    Added to count       Relief counts       Final counts 2 0 5            Placed during labor Accounted for at the end of labor   FSE No NA   IUPC No NA   Cervidil No NA                  Final count performed by 2 team members:  Two Team Members   Thee ROWE RN      Final count correct?: Yes  Pre-Birth Team Brief: Complete  Post-Birth Team Debrief: Complete       Apgars    Living status: Living   1 Minute 5 Minute 10 Minute 15 Minute 20 Minute   Skin color:        Heart rate:        Reflex irritability:        Muscle tone:        Respiratory effort:        Total:               Cord      Vessels: 3 Vessels    Cord Complications: None               Cord Blood Disposition: Lab    Gases Sent?: No    Delayed cord clamping?: Yes    Cord Clamping Delay (seconds):  seconds    Stem cell collection?: No           Grandin Resuscitation    Methods: None       Skin to Skin and Feeding Plan      Skin to skin initiation date/time: 1/10/1841      Skin to skin end date/time:            Labor Events and Shoulder Dystocia    Fetal Tracing Prior to Delivery: Category 1  Shoulder dystocia present?: Neg       Delivery (Maternal) (Provider to Complete) ([id number])    Episiotomy: None  Perineal lacerations: None    Repair suture: None  Genital tract inspection done: Pos       Blood Loss   Mother: Cathleen Urbano #[id number]     Start of Mother's Information      Delivery Blood Loss  10/01/23 1300 - 10/01/23 1611      Delivery QBL (mL) Hospital Encounter 550 mL    Total  550 mL               End of Mother's Information  Mother: Cathleen Urbano #[id number]                Delivery - Provider to Complete ([id number])    Delivering clinician: Марина Hill APRN CNM  Delivery Type (Choose the 1 that will go to the Birth History): Vaginal, Spontaneous                         Other personnel:  Provider Role   Thee Raines Student   Meagan Lee, RN Delivery Nurse                    Placenta    Date/Time: 10/1/2023  3:41 PM  Removal: Spontaneous  Disposition: Hospital disposal             Anesthesia    Method: Epidural  Cervical dilation at placement: 4-7                           JERAMY Cruz CNM

## 2023-10-01 NOTE — PROVIDER NOTIFICATION
10/01/23 1301   Provider Notification   Provider Name/Title Patience Hill CNM   Method of Notification At Bedside   Request Evaluate in Person   Notification Reason Status Update     CNM present to discuss plan of care and epidural. Pt. Requests epidural. WIll proceed with ongoing assessment and care.

## 2023-10-01 NOTE — PROGRESS NOTES
Data: Cathleen Urbano transferred to 7131 via wheelchair at 1758. Baby transferred via isolette.  Action: Receiving unit notified of transfer: Yes. Patient and family notified of room change. Report given to RN at 1712. Belongings sent to receiving unit. Accompanied by Registered Nurse. Oriented patient to surroundings. Call light within reach. ID bands double-checked with receiving RN.  Response: Patient tolerated transfer and is stable.

## 2023-10-01 NOTE — ANESTHESIA PROCEDURE NOTES
"Epidural catheter Procedure Note    Pre-Procedure   Staff -        Anesthesiologist:  Abraham Walker MD       Resident/Fellow: Julio Christensen MD       Performed By: resident       Location: OB       Procedure Start/Stop Times: 10/1/2023 2:12 PM and 10/1/2023 2:22 PM       Pre-Anesthestic Checklist: patient identified, IV checked, risks and benefits discussed, informed consent, monitors and equipment checked, pre-op evaluation and at physician/surgeon's request  Timeout:       Correct Patient: Yes        Correct Procedure: Yes        Correct Site: Yes        Correct Position: Yes   Procedure Documentation  Procedure: epidural catheter       Patient Position: sitting       Patient Prep/Sterile Barriers: sterile gloves, mask, patient draped       Skin prep: Chloraprep       Local skin infiltrated with 3 mL of 1% lidocaine.        Insertion Site: L3-4. (midline approach).       Technique: LORT saline        FLAVIA at 5 cm.       Needle Type: Axxanay needle       Needle Gauge: 17.        Needle Length (Inches): 3.5        Catheter: 19 G.          Catheter threaded easily.           Threaded 10 cm at skin.         # of attempts: 1 and  # of redirects:  0    Assessment/Narrative         Paresthesias: No.       Test dose of 3 mL lidocaine 1.5% w/ 1:200,000 epinephrine at 14:19 CDT.         Test dose negative, 3 minutes after injection, for signs of intravascular, subdural, or intrathecal injection.       Insertion/Infusion Method: LORT saline       Aspiration negative for Heme or CSF via Epidural Catheter.    Medication(s) Administered   0.125% bupivacaine (Epidural) - EPIDURAL   5 mL - 10/1/2023 2:25:00 PM   2 mL - 10/1/2023 2:34:00 PM  Medication Administration Time: 10/1/2023 2:12 PM      FOR [facility] ([facility]) ONLY:   Pain Team Contact information: please page the Pain Team Via [employer]. Search \"Pain\". During daytime hours, please page the attending first. At night please page the resident first.      " Patient states struck head on right parietal region, and pain is to left side. Remains with blurry vision to right eye. Denies any neck or back  pain. Pupils are equal and reactive bilaterally, 3mm.       Lesli Reese RN  09/24/23 2052       Lesli Reese RN  09/24/23 [id number]

## 2023-10-01 NOTE — PROGRESS NOTES
HOSPITAL TRIAGE NOTE  ===================    CHIEF COMPLAINT  ========================  Cathleen Urbano is a 23 year old patient presenting today at 39w6d for evaluation of uterine contractions and increased discharge.    No LMP recorded. Patient is pregnant.  Estimated Date of Delivery: Oct 1, 2023       HPI  ==================   Spoke with Cathleen using ipad [employer] .  is at bedside.     Cathleen reports that she has been experiencing mild contractions through the day and this evening she had some stronger ones. She has also been experiencing more discharge than usual. No itching, burning, odor, gushes, or continued trickling.      Denies fever, cough, SOB or chest pain. Denies having contact with anyone who is Covid-19 positive.    Prenatal record and labs reviewed from [facility], through [facility] EMR.    CONTRACTIONS: mild to moderate   ABDOMINAL PAIN: none  FETAL MOVEMENT: active    VAGINAL BLEEDING: blood smear  RUPTURE OF MEMBRANES: uncertain- pending [employer]  PELVIC PAIN: cramping with ctx    PREGNANCY COMPLICATIONS: limited prenatal care, anemia   OTHER: none    # Pain Assessment:      2023     9:43 PM   Current Pain Score   Patient currently in pain? denies   - Cathleen is experiencing pain due to contractions. Pain management was discussed and the plan was created in a collaborative fashion.  Cathleen's response to the current recommendations: engaged  - Pt states she had some strong contractions earlier but not now. Coping well.       REVIEW OF SYSTEMS  =====================  C: NEGATIVE for fever, chills  I: NEGATIVE for worrisome rashes, moles or lesions  E: NEGATIVE for vision changes or irritation  R: NEGATIVE for significant cough or SOB  CV: NEGATIVE for chest pain, palpitations or varicosities  GI: NEGATIVE for nausea, abdominal pain, heartburn, or change in bowel habits  : NEGATIVE for frequency, dysuria, or hematuria  M: NEGATIVE for significant arthralgias or  myalgia  N: NEGATIVE for headache, weakness, dizziness or paresthesias  P: NEGATIVE for changes in mood or affect    PROBLEM LIST  ===============  Patient Active Problem List    Diagnosis Date Noted    Vaginal discharge during pregnancy in third trimester 09/27/2023     Priority: Medium    Vitamin D deficiency 07/07/2023     Priority: Medium     7/7/23: Recommended supplementation       Language barrier 07/06/2023     Priority: Medium     Communicates with P[place]o        Supervision of high risk pregnancy in second trimester 06/22/2023     Priority: Medium     *moved from country- [place]*  *Dating from 24+1 US*  [facility] Patient Provider Group choice: CNM group  Partner's name: Mariusz  [x]NOB folder  [x]Dating- 24 +1 us, JERILYN completed  [] 1st trimester screening:n/a  []quad/afp: n/a  [x]Fetal anatomy US ordered- M referral level 2 us placed 6/21  [x]Rubella immune  [x]Hep B immune  []Pap- need to ask, plan pp if needed  [x] No added risk for PRE-E  [x] No increased risk for GDM  []No need for utox in labor  []COVID vaccine completed  _____________________________________  []EOB folder  []PP Contraception plan: If tubal,consent date:  []Labor plans: epidural  []:  [x]Infant feeding plan breast  []FLU shot  [x]TDAP given  [x]Rhogam if needed, NA  [x]TOLAC NA  [x] Water birth interest NO  [x]GCT, passed  ________________________________________  [] OTC PP meds sent  []PP plans, time off, support system discussed, resources offered  []Planning CS-ERAS pkt        Iron deficiency anemia, unspecified iron deficiency anemia type 06/22/2023     Priority: Medium     6/5: hbg 9.9, iron sent      6/21: vit c and b12 sent   Repeat with EOB labs, future order in place  7/7/23: Hgb 10.6 at EOB. Recommend continuing with supplementation or given persistent anemia in pregnancy could consider IV Fe infusions if patient prefers. Message sent to RN team.       Limited prenatal care in second trimester  2023     Priority: Medium     Had some prenatal care in [place] prior to moving    : JERILYN completed,  to assist         HISTORIES  ==============  ALLERGIES:    No Known Allergies  PAST MEDICAL HISTORY  Past Medical History:   Diagnosis Date    No pertinent past medical history      SOCIAL HISTORY  Social History     Socioeconomic History    Marital status:      Spouse name: Mariusz    Number of children: 2    Years of education: Not on file    Highest education level: Not on file   Occupational History    Not on file   Tobacco Use    Smoking status: Never    Smokeless tobacco: Never   Substance and Sexual Activity    Alcohol use: Never    Drug use: Never    Sexual activity: Yes     Partners: Male   Other Topics Concern    Not on file   Social History Narrative    Not on file     Social Determinants of Health     Financial Resource Strain: Not on file   Food Insecurity: Not on file   Transportation Needs: Not on file   Physical Activity: Not on file   Stress: Not on file   Social Connections: Not on file   Interpersonal Safety: Not on file   Housing Stability: Not on file     PARTNER: at bedside     FAMILY HISTORY  Family History   Problem Relation Age of Onset    No Known Problems Mother     No Known Problems Father     No Known Problems Brother     No Known Problems Brother     No Known Problems Brother     No Known Problems Brother     No Known Problems Brother     No Known Problems Brother     No Known Problems Brother     No Known Problems Brother     No Known Problems Brother     No Known Problems Maternal Grandmother     No Known Problems Maternal Grandfather     No Known Problems Paternal Grandmother     No Known Problems Paternal Grandfather     No Known Problems Sister      OB HISTORY  OB History    Para Term  AB Living   3 2 2 0 0 2   SAB IAB Ectopic Multiple Live Births   0 0 0 0 2      # Outcome Date GA Lbr Adrian/2nd Weight Sex Delivery Anes PTL Lv   3 Current             2 Term 05/29/22   2.22 kg (4 lb 14.3 oz) F Vag-Spont   CHAPARRTIA   1 Term 01/01/21    F Vag-Spont   CHAPARRITA      Obstetric Comments   Report that their first daughter's birth documents do not reflect her real birthday, but are unsure of her actual date of birth.  They state it is close to 01/01/21.       They also report that both daughters were born full term, but do not recall the actual gestation, and report that birth weight was low- Mariusz pulled up a photo of their second daughter on the scale at her birth, and it was 2.22kg.          Prenatal Labs:   Lab Results   Component Value Date    AS Negative 06/05/2023    HEPBANG Nonreactive 06/05/2023    RUQIGG Positive 06/05/2023    HGB 10.6 (L) 07/06/2023     Rubella- Immune  GBS negative on 9/27/2023    ULTRASOUND(s) reviewed: no level 2, but normal US at 24 weeks     EXAM  ============  /60 (BP Location: Left arm, Patient Position: Semi-Elam's, Cuff Size: Adult Regular)   Pulse 83   Temp 98.1  F (36.7  C) (Oral)   Resp 18   GENERAL APPEARANCE: healthy, alert and no distress  RESP: lnon labored breath  CV: appears well perfused throughout, HR 82   ABDOMEN:  soft, nontender, no epigastric pain  SKIN: no suspicious lesions or rashes  NEURO: Denies headache, blurred vision, other vision changes  PSYCH: mentation appears normal. and affect normal/bright  MS/ LEGS: No edema    CONTRACTIONS: every 7-12 minutes , palpate mild  FETAL HEART TONES: continuous EFM- baseline 115 with moderate variability and positive accelerations. No decelerations.  NST: REACTIVE  EFW: 7.25    PELVIC EXAM: 3cm, 70%, -1, mid, soft   RAVI SCORE: 9  PRESENTATION: VERTEX  BLOOD: no  DISCHARGE: clear    ROM: no  ROMPLUS: negative    DIAGNOSIS  ============  39w6d seen on the [facility] Triage for vaginal discharge and contractions  No ROM and no signs of labor  NST: REACTIVE  Fetal Heart rate tracing:category one    PLAN  ============  Call or return to the Birthplace with contractions,  cramping, abdominal or pelvic pain, vaginal bleeding, leaking fluid or decreased fetal movement.    Review with pt and  that despite tomorrow being her EDC, it would be very normal to go past this date. Discussed that an IOL could be scheduled at her clinic appointment this week if desired.     Follow up- in clinic this week with US and LAURENT appt.     JERAMY Gonzales CNM

## 2023-10-01 NOTE — H&P
ADMIT NOTE  =================  40w0d    Cathleen Urbano is a 23 year old female with an No LMP recorded. Patient is pregnant. and Estimated Date of Delivery: Oct 1, 2023 [facility]1/2023 at 12:35 PM ROM x 1 1/2  hours.   [employer]  used via phone to speak with pt   HPI  ================    Denies fever, cough, SOB or chest pain. Denies having contact with anyone who is Covid-19 positive.  Agreeable to Covid-19 testing  Contractions- every 3-4 minutes  Fetal movement- active  ROM- yes moderate, clear. SROM @ 11am .  Vaginal bleeding- none  GBS- negative  FOB- is involved,   Other labor support-   pt mother in law     Weight gain- 146 - 137 lbs, Total weight gain- 9 lbs  Height- 5'2  BMI- 24  First prenatal visit at 25 weeks, Total visits- 4  Lapse in care 28-39w     PROBLEM LIST  =================  Patient Active Problem List    Diagnosis Date Noted    Encounter for triage in pregnant patient 10/01/2023     Priority: Medium    Labor and delivery indication for care or intervention 10/01/2023     Priority: Medium    Vaginal discharge during pregnancy in third trimester 2023     Priority: Medium    Vitamin D deficiency 2023     Priority: Medium     23: Recommended supplementation       Language barrier 2023     Priority: Medium     Communicates with [place]o        Supervision of high risk pregnancy in second trimester 2023     Priority: Medium     *moved from country- [place]*  *Dating from 24+1 US*  [facility] Patient Provider Group choice: CNM group  Partner's name: Mariusz  [x]NOB folder  [x]Dating- 24 +1 us, JERILYN completed  [] 1st trimester screening:n/a  []quad/afp: n/a  [x]Fetal anatomy US ordered- [facility] referral level 2 us placed   [x]Rubella immune  [x]Hep B immune  []Pap- need to ask, plan pp if needed  [x] No added risk for PRE-E  [x] No increased risk for GDM  []No need for utox in labor  []COVID vaccine  completed  _____________________________________  []EOB folder  []PP Contraception plan: If tubal,consent date:  []Labor plans: epidural  []:  [x]Infant feeding plan breast  []FLU shot  [x]TDAP given  [x]Rhogam if needed, NA  [x]TOLAC NA  [x] Water birth interest NO  [x]GCT, passed  ________________________________________  [] OTC PP meds sent  []PP plans, time off, support system discussed, resources offered  []Planning CS-ERAS pkt        Iron deficiency anemia, unspecified iron deficiency anemia type 2023     Priority: Medium     : hbg 9.9, iron sent      : vit c and b12 sent   Repeat with EOB labs, future order in place  23: Hgb 10.6 at EOB. Recommend continuing with supplementation or given persistent anemia in pregnancy could consider IV Fe infusions if patient prefers. Message sent to RN team.       Limited prenatal care in second trimester 2023     Priority: Medium     Had some prenatal care in [place] prior to moving    : JEIRLYN completed,  to assist         HISTORIES  ============  No Known Allergies  Past Medical History:   Diagnosis Date    No pertinent past medical history      No past surgical history on file..  Family History   Problem Relation Age of Onset    No Known Problems Mother     No Known Problems Father     No Known Problems Brother     No Known Problems Brother     No Known Problems Brother     No Known Problems Brother     No Known Problems Brother     No Known Problems Brother     No Known Problems Brother     No Known Problems Brother     No Known Problems Brother     No Known Problems Maternal Grandmother     No Known Problems Maternal Grandfather     No Known Problems Paternal Grandmother     No Known Problems Paternal Grandfather     No Known Problems Sister      Social History     Tobacco Use    Smoking status: Never    Smokeless tobacco: Never   Substance Use Topics    Alcohol use: Never     OB History    Para Term  AB Living   3  2 2 0 0 2   SAB IAB Ectopic Multiple Live Births   0 0 0 0 2      # Outcome Date GA Lbr Adrian/2nd Weight Sex Delivery Anes PTL Lv   3 Current            2 Term 05/29/22   2.22 kg (4 lb 14.3 oz) F Vag-Spont   CHAPARRITA   1 Term 01/01/21    F Vag-Spont   CHAPARRITA      Obstetric Comments   Report that their first daughter's birth documents do not reflect her real birthday, but are unsure of her actual date of birth.  They state it is close to 01/01/21.       They also report that both daughters were born full term, but do not recall the actual gestation, and report that birth weight was low- Mariusz pulled up a photo of their second daughter on the scale at her birth, and it was 2.22kg.             LABS:   ===========  Prenatal Labs:  Rhogam not indicated   Lab Results   Component Value Date    AS Negative 06/05/2023    RUQIGG Positive 06/05/2023    HEPBANG Nonreactive 06/05/2023    HGB 10.6 (L) 07/06/2023    HIAGAB Nonreactive 06/05/2023    GLU1 129 07/06/2023     Rubella immune   Neg GBS  Pt presented late to care  had one US 6/12/23 at 24wks EDC 10/1/23 post placenta    Other labs: passed glucose screening 129 at 25 wks   COVID-19 PCR Results           No data to display              COVID-19 Antibody Results, Testing for Immunity           No data to display               Results for orders placed or performed during the hospital encounter of 10/01/23 (from the past 24 hour(s))   Rupture of Fetal Membranes by ROM Plus   Result Value Ref Range    Rupture of Fetal Membranes by ROM Plus Positive (A) Negative, Invalid, Suggest Repeat    Narrative    It is recommended that the tests to detect rupture of the amniotic membranes should not be used without other clinical assessments to make clinical patient management decision.       ROS  =========  Pt denies significant respiratory, cardiovacular, GI, or muscular/skeletalcomplaints.    See RN data base ROS.       PHYSICAL EXAM:  ===============   Normotensive   General appearance:  uncomfortable with contractions  GENERAL APPEARANCE: healthy, alert and no distress  RESP: lungs clear to auscultation - no rales, rhonchi or wheezes  CV: regular rates and rhythm, normal S1 S2, no S3 or S4 and no murmur,and no varicosities  ABDOMEN:  soft, nontender, no epigastric pain  SKIN: no suspicious lesions or rashes  NEURO: Denies headache, blurred vision, other vision changes  PSYCH: mentation appears normal. and affect normal/bright  Legs: Reflexes normal bilaterally     Abdomen: gravid, vertex fetus per Leopold's, non-tender between contractions.   Cephalic presentation confirmed by  leopolds sutures palpated BSUS not available   EFW-  6 1/2  lbs.   CONTRACTIONS: every 3-4 minutes  FETAL HEART TONES: continuous EFM- baseline 140 with moderate variability and positive accelerations. No decelerations.  PELVIC EXAM: 6/ 90%/ Mid/ soft/ -1   RAVI SCORE: 10  BLOODY SHOW: no   ROM:yes moderate, clear. SROM @ 11 am   FLUID: clear  ROMPLUS: positive    # Pain Assessment:      2023     9:43 PM   Current Pain Score   Patient currently in pain? denies   - Cathleen is experiencing pain due to labor  she is considering epidural but not at this time . Pain management was discussed and the plan was created in a collaborative fashion.  Cathleen's response to the current recommendations: engaged      ASSESSMENT:  ==============  22 yo  IUP @ 40w0d admitted in active labor  SROM x 1.5 h   NST REACTIVE  Fetal Heart Tones - category one  GBS- negative  Covid- pending    Patient Active Problem List   Diagnosis    Supervision of high risk pregnancy in second trimester    Iron deficiency anemia, unspecified iron deficiency anemia type    Limited prenatal care in second trimester    Language barrier    Vitamin D deficiency    Vaginal discharge during pregnancy in third trimester    Encounter for triage in pregnant patient    Labor and delivery indication for care or intervention       PLAN:  ===========  Admit - see IP  orders  pain medication options of nitrous oxide, fentanyl IV and epidural anesthesia reviewed with pt. Pt is interested in at this time none  considering epidural will let us know   MD consultant on call / available prn  Anticipate   JERAMY Cruz CNM

## 2023-10-01 NOTE — PROVIDER NOTIFICATION
10/01/23 1520   Provider Notification   Provider Name/Title Patience Hill   Method of Notification At Bedside   Request Attend Delivery     CNM present for delivery

## 2023-10-01 NOTE — PLAN OF CARE
Assumed care of patient at 1500, upon SVE patient had a lip and patient placed in throne position to finish dilating. Patient delivered a baby boy at 1535. Apgars 9/9. Patient had no lacerations, . All VSS and patient remained afebrile. Patient given buccal misoprostol and Methergine. Patient given Toradol before moving to . See MAR for details. EFM as charted. All post delivery cares provided. Anticipate transfer up to [facility].

## 2023-10-01 NOTE — PLAN OF CARE
Goal Outcome Evaluation:       Patient VSS and afebrile. Patient received epidural administration and positioned in semi-fowlers afterwards. 's moderate variability, intermittent ED's noted, ctx q2-4 mins. Patient notified when to call out to RN, verbalized understanding and agreed to plan. Will proceed with ongoing assessment.

## 2023-10-01 NOTE — PLAN OF CARE
Data: Patient presented to [facility] at 1130.   Reason for maternal/fetal assessment per patient is No chief complaint on file.  .  Patient is a . Prenatal record reviewed.      OB History    Para Term  AB Living   3 2 2 0 0 2   SAB IAB Ectopic Multiple Live Births   0 0 0 0 2      # Outcome Date GA Lbr Adrian/2nd Weight Sex Delivery Anes PTL Lv   3 Current            2 Term 21   2.22 kg (4 lb 14.3 oz) F Vag-Spont   CHAPARRITA   1 Term 21    F Vag-Spont   CHAPARRITA      Obstetric Comments   Report that their first daughter's birth documents do not reflect her real birthday, but are unsure of her actual date of birth.  They state it is close to 21.       They also report that both daughters were born full term, but do not recall the actual gestation, and report that birth weight was low- Mariusz pulled up a photo of their second daughter on the scale at her birth, and it was 2.22kg.        . Medical history:   Past Medical History:   Diagnosis Date    No pertinent past medical history    . Gestational Age 40w0d. VSS. Fetal movement present. Patient denies cramping, backache, vaginal discharge, pelvic pressure, UTI symptoms, GI problems, bloody show, vaginal bleeding, edema, headache, visual disturbances, epigastric or URQ pain, abdominal pain. Endorses rupture of membranes, Clear fluid, ROM Plus is POSITIVE . Support persons  + Mother in law present.  Action: Verbal consent for EFM. Triage assessment completed. EFM applied. Uterine assessment contraction. Fetal assessment: Presumed adequate fetal oxygenation documented (see flow record).   Response: JUAN NEWBY informed of patient on unit. Plan per provider is admit. Patient verbalized agreement with plan. Patient transferred to room 444 ambulatory, oriented to room and call light. Report given to Clementina GAITAN.

## 2023-10-01 NOTE — PROGRESS NOTES
S;General appearance: comfortable  Now comfortable with epidural anesthesia in place  agreeable to cervical assessment prior to changing positions and assess for AROm of forebag   Support:       Blood pressure 125/64, pulse 85, temperature 98.3  F (36.8  C), temperature source Oral, resp. rate 17, SpO2 99 %, not currently breastfeeding.      Baseline rate 145, normal  Variability moderate  Accelerations present   Decelerations not present    CONTRACTIONS: Contractions every 2-4 minutes.  Palpate: moderate  Pitocin- none,  Antibiotics- none      ROM: clear fluid  PELVIC EXAM:deferred  # Pain Assessment:      10/1/2023     1:01 PM   Current Pain Score   Patient currently in pain? yes   Pt is becoming more comfortable with epidural in place        Assessment: EFM interpretation suggests absence of concern for fetal metabolic acidemia at this time due to mod variability and accels       ASSESSMENT:  ==============  Cathleen Urbano  23 year old  female  Estimated Date of Delivery: Oct 1, 2023  IUP @ 40w0d active labor   Fetal Heart rate tracing  category one GBS- negative    Patient Active Problem List   Diagnosis    Supervision of high risk pregnancy in second trimester    Iron deficiency anemia, unspecified iron deficiency anemia type    Limited prenatal care in second trimester    Language barrier    Vitamin D deficiency    Vaginal discharge during pregnancy in third trimester    Encounter for triage in pregnant patient    Labor and delivery indication for care or intervention          PLAN:  ===========  Anticipate   MD consultant on call / available prn    JERAMY Cruz CNM,LAURENT, APRN

## 2023-10-01 NOTE — DISCHARGE INSTRUCTIONS
Data: [facility] at 2140.   Reason for maternal/fetal assessment per patient is No chief complaint on file.  . Patient is a . Prenatal record reviewed.      OB History    Para Term  AB Living   3 2 2 0 0 2   SAB IAB Ectopic Multiple Live Births   0 0 0 0 2      # Outcome Date GA Lbr Adrian/2nd Weight Sex Delivery Anes PTL Lv   3 Current            2 Term 21   2.22 kg (4 lb 14.3 oz) F Vag-Spont   CHAPARRITA   1 Term 20    F Vag-Spont   CHAPARRITA      Obstetric Comments   Report that their first daughter's birth documents do not reflect her real birthday, but are unsure of her actual date of birth.  They state it is close to 21.       They also report that both daughters were born full term, but do not recall the actual gestation, and report that birth weight was low- Mariusz pulled up a photo of their second daughter on the scale at her birth, and it was 2.22kg.           Medical History:   Past Medical History:   Diagnosis Date    No pertinent past medical history    . Gestational Age 40w0d. VSS. Cervix: dilated to 3.  Fetal movement present. Patient reports occasional contractions with some vaginal discharge, but denies pelvic pressure, UTI symptoms, GI problems, bloody show, vaginal bleeding, edema, headache, visual disturbances, epigastric or URQ pain, abdominal pain. Support persons is present.  Action: Verbal consent for EFM. Triage assessment completed. EFM applied for fetal assessment. Uterine assessment: contractions every 8-10 min. Fetal assessment: Presumed adequate fetal oxygenation documented (see flow record). Patient education pamphlets given on fetal movement counts. Patient instructed to report change in fetal movement, vaginal leaking of fluid or bleeding, abdominal pain, or any concerns related to the pregnancy to her nurse/physician.   Response: Mackenzie Martinez CNM informed of pt's arrival. Plan per provider is to discharge pt home at this time. Patient  verbalized understanding of education and verbalized agreement with plan. Discharged ambulatory at 0010.

## 2023-10-02 VITALS
WEIGHT: 136.8 LBS | RESPIRATION RATE: 16 BRPM | BODY MASS INDEX: 25.02 KG/M2 | TEMPERATURE: 98.2 F | SYSTOLIC BLOOD PRESSURE: 115 MMHG | DIASTOLIC BLOOD PRESSURE: 70 MMHG | HEART RATE: 72 BPM | OXYGEN SATURATION: 100 %

## 2023-10-02 LAB
C PNEUM DNA SPEC QL NAA+PROBE: NOT DETECTED
CREAT SERPL-MCNC: 0.66 MG/DL (ref 0.51–0.95)
EGFRCR SERPLBLD CKD-EPI 2021: >90 ML/MIN/1.73M2
ERYTHROCYTE [DISTWIDTH] IN BLOOD BY AUTOMATED COUNT: 14.4 % (ref 10–15)
FLUAV H1 2009 PAND RNA SPEC QL NAA+PROBE: NOT DETECTED
FLUAV H1 RNA SPEC QL NAA+PROBE: NOT DETECTED
FLUAV H3 RNA SPEC QL NAA+PROBE: NOT DETECTED
FLUAV RNA SPEC QL NAA+PROBE: NOT DETECTED
FLUBV RNA SPEC QL NAA+PROBE: NOT DETECTED
HADV DNA SPEC QL NAA+PROBE: NOT DETECTED
HCOV PNL SPEC NAA+PROBE: NOT DETECTED
HCT VFR BLD AUTO: 25.5 % (ref 35–47)
HGB BLD-MCNC: 8.1 G/DL (ref 11.7–15.7)
HMPV RNA SPEC QL NAA+PROBE: NOT DETECTED
HPIV1 RNA SPEC QL NAA+PROBE: NOT DETECTED
HPIV2 RNA SPEC QL NAA+PROBE: NOT DETECTED
HPIV3 RNA SPEC QL NAA+PROBE: NOT DETECTED
HPIV4 RNA SPEC QL NAA+PROBE: NOT DETECTED
M PNEUMO DNA SPEC QL NAA+PROBE: NOT DETECTED
MCH RBC QN AUTO: 26.5 PG (ref 26.5–33)
MCHC RBC AUTO-ENTMCNC: 31.8 G/DL (ref 31.5–36.5)
MCV RBC AUTO: 83 FL (ref 78–100)
PLATELET # BLD AUTO: 178 10E3/UL (ref 150–450)
RBC # BLD AUTO: 3.06 10E6/UL (ref 3.8–5.2)
RSV RNA SPEC QL NAA+PROBE: NOT DETECTED
RSV RNA SPEC QL NAA+PROBE: NOT DETECTED
RV+EV RNA SPEC QL NAA+PROBE: DETECTED
T PALLIDUM AB SER QL: NONREACTIVE
WBC # BLD AUTO: 13.6 10E3/UL (ref 4–11)

## 2023-10-02 PROCEDURE — 250N000011 HC RX IP 250 OP 636: Performed by: MIDWIFE

## 2023-10-02 PROCEDURE — 258N000003 HC RX IP 258 OP 636: Performed by: ADVANCED PRACTICE MIDWIFE

## 2023-10-02 PROCEDURE — 250N000011 HC RX IP 250 OP 636: Performed by: ADVANCED PRACTICE MIDWIFE

## 2023-10-02 PROCEDURE — 250N000013 HC RX MED GY IP 250 OP 250 PS 637: Performed by: MIDWIFE

## 2023-10-02 PROCEDURE — 258N000003 HC RX IP 258 OP 636: Performed by: MIDWIFE

## 2023-10-02 PROCEDURE — 258N000003 HC RX IP 258 OP 636: Performed by: REGISTERED NURSE

## 2023-10-02 PROCEDURE — 36415 COLL VENOUS BLD VENIPUNCTURE: CPT | Performed by: ADVANCED PRACTICE MIDWIFE

## 2023-10-02 PROCEDURE — 250N000011 HC RX IP 250 OP 636: Mod: JZ | Performed by: REGISTERED NURSE

## 2023-10-02 PROCEDURE — 85041 AUTOMATED RBC COUNT: CPT | Performed by: ADVANCED PRACTICE MIDWIFE

## 2023-10-02 PROCEDURE — 258N000003 HC RX IP 258 OP 636

## 2023-10-02 RX ORDER — MULTIVIT WITH MINERALS/LUTEIN
250 TABLET ORAL DAILY
Qty: 60 TABLET | Refills: 0 | Status: SHIPPED | OUTPATIENT
Start: 2023-10-02 | End: 2023-11-13

## 2023-10-02 RX ORDER — SODIUM CHLORIDE, SODIUM LACTATE, POTASSIUM CHLORIDE, CALCIUM CHLORIDE 600; 310; 30; 20 MG/100ML; MG/100ML; MG/100ML; MG/100ML
INJECTION, SOLUTION INTRAVENOUS
Status: COMPLETED
Start: 2023-10-02 | End: 2023-10-02

## 2023-10-02 RX ORDER — IBUPROFEN 800 MG/1
800 TABLET, FILM COATED ORAL EVERY 6 HOURS PRN
Qty: 90 TABLET | Refills: 0 | COMMUNITY
Start: 2023-10-02 | End: 2023-10-02

## 2023-10-02 RX ORDER — LANOLIN ALCOHOL/MO/W.PET/CERES
1000 CREAM (GRAM) TOPICAL DAILY
Qty: 60 TABLET | Refills: 0 | Status: SHIPPED | OUTPATIENT
Start: 2023-10-02 | End: 2023-11-13

## 2023-10-02 RX ORDER — IBUPROFEN 800 MG/1
800 TABLET, FILM COATED ORAL EVERY 6 HOURS PRN
Qty: 90 TABLET | Refills: 0 | Status: SHIPPED | OUTPATIENT
Start: 2023-10-02 | End: 2023-11-13

## 2023-10-02 RX ORDER — ACETAMINOPHEN 325 MG/1
650 TABLET ORAL EVERY 4 HOURS PRN
Qty: 90 TABLET | Refills: 0 | COMMUNITY
Start: 2023-10-02 | End: 2023-11-13

## 2023-10-02 RX ORDER — DOCUSATE SODIUM 100 MG/1
100 CAPSULE, LIQUID FILLED ORAL DAILY
Qty: 90 CAPSULE | Refills: 0 | COMMUNITY
Start: 2023-10-02 | End: 2023-11-13

## 2023-10-02 RX ORDER — FERROUS SULFATE 325(65) MG
325 TABLET, DELAYED RELEASE (ENTERIC COATED) ORAL EVERY OTHER DAY
Qty: 90 TABLET | Refills: 0 | Status: SHIPPED | OUTPATIENT
Start: 2023-10-02 | End: 2023-11-13

## 2023-10-02 RX ORDER — DIPHENHYDRAMINE HYDROCHLORIDE 50 MG/ML
50 INJECTION INTRAMUSCULAR; INTRAVENOUS
Status: DISCONTINUED | OUTPATIENT
Start: 2023-10-02 | End: 2023-10-02 | Stop reason: HOSPADM

## 2023-10-02 RX ORDER — METHYLPREDNISOLONE SODIUM SUCCINATE 125 MG/2ML
125 INJECTION, POWDER, LYOPHILIZED, FOR SOLUTION INTRAMUSCULAR; INTRAVENOUS
Status: DISCONTINUED | OUTPATIENT
Start: 2023-10-02 | End: 2023-10-02 | Stop reason: HOSPADM

## 2023-10-02 RX ORDER — PRENATAL VIT/IRON FUM/FOLIC AC 27MG-0.8MG
1 TABLET ORAL DAILY
Qty: 90 TABLET | Refills: 3 | Status: SHIPPED | OUTPATIENT
Start: 2023-10-02 | End: 2023-11-13

## 2023-10-02 RX ORDER — ACETAMINOPHEN 325 MG/1
650 TABLET ORAL EVERY 4 HOURS PRN
Qty: 90 TABLET | COMMUNITY
Start: 2023-10-02 | End: 2023-10-02

## 2023-10-02 RX ADMIN — IBUPROFEN 800 MG: 800 TABLET ORAL at 01:06

## 2023-10-02 RX ADMIN — IRON SUCROSE 300 MG: 20 INJECTION, SOLUTION INTRAVENOUS at 15:51

## 2023-10-02 RX ADMIN — DOCUSATE SODIUM 100 MG: 100 CAPSULE, LIQUID FILLED ORAL at 10:05

## 2023-10-02 RX ADMIN — CLINDAMYCIN PHOSPHATE 900 MG: 900 INJECTION, SOLUTION INTRAVENOUS at 06:00

## 2023-10-02 RX ADMIN — GENTAMICIN SULFATE 85 MG: 40 INJECTION, SOLUTION INTRAMUSCULAR; INTRAVENOUS at 01:05

## 2023-10-02 RX ADMIN — GENTAMICIN SULFATE 85 MG: 40 INJECTION, SOLUTION INTRAMUSCULAR; INTRAVENOUS at 10:01

## 2023-10-02 RX ADMIN — CLINDAMYCIN PHOSPHATE 900 MG: 900 INJECTION, SOLUTION INTRAVENOUS at 14:16

## 2023-10-02 RX ADMIN — SODIUM CHLORIDE, POTASSIUM CHLORIDE, SODIUM LACTATE AND CALCIUM CHLORIDE 1000 ML: 600; 310; 30; 20 INJECTION, SOLUTION INTRAVENOUS at 10:04

## 2023-10-02 RX ADMIN — ACETAMINOPHEN 650 MG: 325 TABLET, FILM COATED ORAL at 07:07

## 2023-10-02 RX ADMIN — IBUPROFEN 800 MG: 800 TABLET ORAL at 07:08

## 2023-10-02 RX ADMIN — GENTAMICIN SULFATE 85 MG: 40 INJECTION, SOLUTION INTRAMUSCULAR; INTRAVENOUS at 17:47

## 2023-10-02 ASSESSMENT — ACTIVITIES OF DAILY LIVING (ADL)
ADLS_ACUITY_SCORE: 18

## 2023-10-02 NOTE — PROVIDER NOTIFICATION
10/02/23 0938   Provider Notification   Provider Name/Title Pharm   Method of Notification Phone     Called as no gent.  Emar  request was sent at 09 for missing dose.  Per pharm, they will send a dose.

## 2023-10-02 NOTE — CONSULTS
SW acknowledges order to see patient.  SW spoke with RN on [facility].  RN indicates patient has multiple visitors right now.  SW provided pager # and requested to be paged if/when visitors to leave to complete visit with patient.    Kalley Thurner, MSW, [facility]  Maternal and Child Health   Office: [phone]  Pager: [phone]  After Hours Pager: [phone]  [email or web address]

## 2023-10-02 NOTE — PROVIDER NOTIFICATION
10/01/23 2009   Provider Notification   Provider Name/Title Andrew   Method of Notification Electronic Page   Request Evaluate in Person   Notification Reason Vital Signs Change     pt having consistent temp of 101 degrees sinec admitted to floor. bleeding WNL. she got miso in L&D but unsure if this is cause. please advise thanks

## 2023-10-02 NOTE — PROVIDER NOTIFICATION
10/02/23 1024   Provider Notification   Provider Name/Title DUTCH Carter CNM   Method of Notification Electronic Page     Pt is requesting to go home today around 1600.  States she has a daughter to take care of at home.

## 2023-10-02 NOTE — PLAN OF CARE
Goal Outcome Evaluation:         Data: Vital signs within normal limit except for febrile in 101 and 102 degrees. MD pageorlin and patient started on IV antibiotics. COVID and resp panel collected - COVID negative, resp panel pending . Postpartum checks within normal limits - see flow record. Patient eating and drinking normally. Patient able to empty bladder independently and is up ambulating.  Patient performing self cares with minimal assist and is able to care for infant. All cares  explained and done with [facility] .   Action: Patient medicated during the shift for pain and cramping. See MAR. Patient reassessed within 1 hour after each medication and pain was improved - patient stated she was comfortable. Patient education done about feeding infant, safe sleep, postpartum cares. See flow record.  Response: Positive attachment behaviors observed with infant. Support persons  and mother in law present.   Plan: Anticipate discharge on 10/3.

## 2023-10-02 NOTE — PROVIDER NOTIFICATION
10/02/23 1045   Provider Notification   Provider Name/Title DUTCH Carter CNM   Method of Notification Electronic Page     Talked with Dr Gonzalez.   is routine as maternal elevated temps after delv of placenta.   may be d/c'd today pending 24 hr lab results.

## 2023-10-02 NOTE — PLAN OF CARE
Goal Outcome Evaluation:       Data: VSS and postpartum checks WNL. Patient eating and drinking normally. Patient able to empty bladder independently and up ambulating. Patient performing self care and able to care for infant. Fundus at U and firm  without massage.  lochia scant and  no blood clots.   Action: Patient taking Tylenol and Ibuprofen for pain and pain assessed . Encouraged patient to breast / formula  feed every 2 - 3 hours and to monitor for cues to feed infant. Patient education done( education record). Used [facility]  service to explain cares.   Response: Patient participating in infant's cares by feeding infant. . Positive attachment with infant observed. Mother in law at  at bedside and attentive to infant and patient.   Plan: Continue with the plan of cares.

## 2023-10-02 NOTE — PROGRESS NOTES
Anesthesia Post-Partum Follow-Up Note After  with Epidural    Patient: Cathleen Urbano    Patient location: Post-partum floor.    Anesthesia type: Epidural    Subjective:     She does not complain of pruritis at this time. She denies weakness, denies paresthesia, denies difficulties breathing or voiding, denies nausea or vomiting, and denies headache. She is able to ambulate and tolerates regular diet.    Objective:    Respiratory Function (RR / SpO2 / Airway Patency): Satisfactory    Cardiac Function (HR / Rhythm / BP): Satisfactory    Strength and sensation lower extremities: Normal    Site of epidural insertion: No signs of infection or inflammation.     Last Vitals: /68 (Cuff Size: Adult Regular)   Pulse 86   Temp 36.4  C (97.5  F) (Oral)   Resp 16   SpO2 100%   Breastfeeding Unknown     Assessment and plan:   Cathleen Urbano is a 23 year old female  post-partum #1 s/p  No admission procedures for hospital encounter.. An epidural catheter was successfully inserted at the level of L3-L$ without technical challenges. This successfully provided labor analgesia via PCEA pump infusing Ropivacaine 0.1% with Fentanyl 2mcg/mL. The patient delivered via  and the epidural catheter was removed immediately thereafter by the L&D RN with the catheter tip in tact per chart review.     At this time, there is no evidence of adverse side effects associated with the insertion or removal of the epidural catheter. If the patient develops new lower extremity paresis or paresthesias; or if there are concerns regarding the insertion site of the catheter, please reach out to the Dept of Anesthesia.    Thank you for including us in the care for this patient.    Jose Carlos Champion MD   Anesthesiology Resident PGY-3

## 2023-10-02 NOTE — DISCHARGE SUMMARY
[facility] Discharge Summary    Cathleen Urbano MRN# [id number]   Age: 23 year old YOB: 2000     Date of Admission:  10/1/2023  Date of Discharge::  10/2/2023  Admitting Physician:  JERAMY Cruz CNM  Discharge Physician:  JERAMY Honeycutt CNM      Home clinic: [facility]          Admission Diagnoses:   Encounter for triage in pregnant patient [Z36.89]  Labor and delivery indication for care or intervention [O75.9]          Discharge Diagnosis:     Normal spontaneous vaginal delivery  Intrauterine pregnancy at 40+0 weeks gestation  Postpartum endometritis  Anemia - hgb 8.1           Procedures:     Procedure(s): No additional procedures performed       No procedures performed during this admission           Medications Prior to Admission:     Medications Prior to Admission   Medication Sig Dispense Refill Last Dose    Cholecalciferol (VITAMIN D) 50 MCG (2000 UT) CAPS Take 2 capsules by mouth daily 180 capsule 3     [DISCONTINUED] cyanocobalamin (VITAMIN B-12) 1000 MCG tablet Take 1 tablet (1,000 mcg) by mouth daily 60 tablet 1     [DISCONTINUED] ferrous sulfate (FE TABS) 325 (65 Fe) MG EC tablet Take 1 tablet (325 mg) by mouth every other day 90 tablet 3     [DISCONTINUED] Prenatal Vit-Fe Fumarate-FA (PRENATAL MULTIVITAMIN W/IRON) 27-0.8 MG tablet Take 1 tablet by mouth daily 90 tablet 3     [DISCONTINUED] vitamin C (ASCORBIC ACID) 250 MG tablet Take 1 tablet (250 mg) by mouth daily 60 tablet 1              Discharge Medications:     Current Discharge Medication List        START taking these medications    Details   acetaminophen (TYLENOL) 325 MG tablet Take 2 tablets (650 mg) by mouth every 4 hours as needed for mild pain or fever (greater than or equal to 38 C /100.4 F (oral) or 38.5 C/ 101.4 F (core).)  Qty: 90 tablet, Refills: 0    Associated Diagnoses: Single liveborn infant delivered vaginally      docusate sodium (COLACE) 100 MG capsule Take 1 capsule (100 mg)  by mouth daily  Qty: 90 capsule, Refills: 0    Associated Diagnoses: Single liveborn infant delivered vaginally      ibuprofen (ADVIL/MOTRIN) 800 MG tablet Take 1 tablet (800 mg) by mouth every 6 hours as needed for other (cramping)  Qty: 90 tablet, Refills: 0    Associated Diagnoses: Single liveborn infant delivered vaginally           CONTINUE these medications which have CHANGED    Details   cyanocobalamin (VITAMIN B-12) 1000 MCG tablet Take 1 tablet (1,000 mcg) by mouth daily  Qty: 60 tablet, Refills: 0    Associated Diagnoses: Supervision of high risk pregnancy in second trimester      ferrous sulfate (FE TABS) 325 (65 Fe) MG EC tablet Take 1 tablet (325 mg) by mouth every other day  Qty: 90 tablet, Refills: 0    Associated Diagnoses: Intrauterine pregnancy      Prenatal Vit-Fe Fumarate-FA (PRENATAL MULTIVITAMIN W/IRON) 27-0.8 MG tablet Take 1 tablet by mouth daily  Qty: 90 tablet, Refills: 3    Associated Diagnoses: Intrauterine pregnancy      vitamin C (ASCORBIC ACID) 250 MG tablet Take 1 tablet (250 mg) by mouth daily  Qty: 60 tablet, Refills: 0    Associated Diagnoses: Supervision of high risk pregnancy in second trimester           CONTINUE these medications which have NOT CHANGED    Details   Cholecalciferol (VITAMIN D) 50 MCG (2000 UT) CAPS Take 2 capsules by mouth daily  Qty: 180 capsule, Refills: 3    Associated Diagnoses: Low vitamin D level                   Consultations:   Consultation during this admission received from MD team re: postpartum infection           Brief History of Labor:   Brief course of labor:pt presented at 1235 reporting onset of labor and SROM at 11 am    Progressed well had epidural at 6 cm for pain relief      Pt became complete at 1532 and started pushing . Delivered a vigorous baby boy  who was immediately dried per pt request not placed on abdomen until in a warm blanket . Umbilical cord was double clamped and cut  after the cord stopped pulsing.  Placenta spontaneously  "delivered intact without difficulty via Shultze mechanism. Intact perineum .  IV pitocin opened after delivery of baby  noted 300 ml blood loss after delivery of placenta pt received IM methergine and buccal miso per protocol    Fundus is firm  and midline.  Mom and baby are stable.      IUP at 40 weeks gestation delivered on 2023.     delivery of a viable Male infant.  Weight : pending  Apgars of 8 at 1 minute and 9 at 5 minutes.  Labor was spontaneous.  Medications administered  in labor:  Pain Rx Epidural; Antibiotics No;  Perineum: Intact  Placenta-mechanism: spontaneous, intact,  with a 3 vessel cord. IV oxytocin was given.  QBL was 575.  Anticipated Discharge Date: 10/2/23   Complications of pregnancy, labor and delivery: None during labor or birth  lapse in prenatal care late to care dating by 24 wk    Birth attendants:JERAMY Cruz CNM, LAURENT DESHPANDE     Assessment Day of Discharge    Vital signs:  Temp: 98  F (36.7  C) Temp src: Oral BP: 108/65 Pulse: 71   Resp: 16 SpO2: 100 % O2 Device: None (Room air)        Estimated body mass index is 26.7 kg/m  as calculated from the following:    Height as of 23: 1.575 m (5' 2\").    Weight as of 23: 66.2 kg (146 lb).      Breasts: Soft, filling  Nipples: Intact, Non-tender  Abdomen: Soft, Non-tender    Uterus: Fundus Firm, Non-tender, located at below the umbilicus   Lochia: Rubra, appropriate amount    Perineum:  Well-approximated, healing well  Lower Extremities: no Edema Bilateral, Negative Cristopher's Sign           Hospital Course:   The patient's hospital course was unremarkable other than postpartum fever suspicious for postpartum endometritis and postpartum anemia (hgb 8.1).  Given antibiotics for 24 hours and 1 dose of IV iron for anemia. On discharge, pain was well controlled. Vaginal bleeding is similar to peak menstrual flow.  Voiding without difficulty.  Ambulating well and tolerating a normal diet.  No fever x 18 hours  " Breastfeeding well and supplementing with formula.  Infant is stable.  Has not had bowel movement., but is taking stool softener. Mood stable, has family supports identified.   Cathleen Urbano was discharged on post-partum day #1.    Post-partum hemoglobin:   Hemoglobin   Date Value Ref Range Status   10/02/2023 8.1 (L) 11.7 - 15.7 g/dL Final        Rh:POS, Rhogam not indicated.   Rubella status:immune   Plan for contraception: mini pill / progesterone only pill or IUD (does not want anyone to know).     Reviewed Chapter One of  [facility] including warning signs of postpartum, activity level, avoiding IC for 6 weeks, Tub soaks BID, Kegels, abdominal exercises, breast care,  postpartum depression/anxiety.  Reviewed how to establish/maintain milk supply, nipple care, pumping for storage, fore/hind milk, wet/dirty diapers to expect each day, resources prn if questions or concerns.  Pt verbalized understanding with teach back. Reinforced importance of presenting to ED or urgent care if another fever occurs or if she feels unwell.          Discharge Instructions and Follow-Up:     Discharge diet: Regular, Iron Rich, High Fiber, Minimum 80oz water daily   Discharge activity: Pelvic rest: abstain from intercourse and do not use tampons for 6 week(s)   Discharge follow-up: Follow up with [facility] in 2 weeks  Follow up with [facility] in 6 weeks   Wound care: Drink plenty of fluids  Ice to area for comfort   Keep wound clean and dry   Avoid constipation   Sitz bath TID            Discharge Disposition:     Discharged to home           JERAMY Honeycutt CNM

## 2023-10-02 NOTE — PROGRESS NOTES
Phone call to RN, Farzana to inquire if patient is ready for SW visit and/or if visitors are still present.   Patient had requested to speak to SW before discharge but did not want family members present at the time of SW visit.    RN reports patient has since declined SW visit.      Order closed.    BORA Hartman [facility]  Clinical   Maternal Child Health  Phone:  [phone]  Pager:  [phone]

## 2023-10-02 NOTE — PROVIDER NOTIFICATION
10/02/23 0704   Provider Notification   Provider Name/Title Tori Carter   Method of Notification Electronic Page   Request Evaluate-Remote   Notification Reason Lab Results     patient positive for Human Rhinovirus

## 2023-10-02 NOTE — PLAN OF CARE
Goal Outcome Evaluation:      Plan of Care Reviewed With: patient    Overall Patient Progress: improvingOverall Patient Progress: improving    Outcome Evaluation: VSS.  Pt is comfortable.  Droplet precautions initiated and in place.  Summary:  received shift change report fr torres RN at 0730.  Pt updated on cares via [employer] ipad .  Pt reports no s/s of URI.  Pt denies pain.  Up to bathroom and in room without assistance needed.  Reports no dizziness.  Voiding adequate amounts.  Eating and drinking. IV had infiltrated.  Float RN started new site.  IV Gent hung when avail.  Pt turned in EDS and BC with spouse.  Spouse, mother in law and daughter in room with  and pt.  NICHELLE Maria will see pt when pt is alone - per SW.  Seen by DUTCH Carter CNM this morning.  See notes re: discharge to home request made by pt.  Plan to start IV iron infusion with pt's consent betwn abx infusions.  Per pt, formula only feeding.  FF w scant flow.  Call light and phone are within reach.  Ipad at bedside.

## 2023-10-02 NOTE — PROVIDER NOTIFICATION
10/02/23 1156   Provider Notification   Provider Name/Title SANTIAGO Carter CNM   Method of Notification Electronic Page     Explained iron transfusion and rationale as hgb 8.1 today.  Pt declines the transfusion.

## 2023-10-02 NOTE — PROGRESS NOTES
Cathleen Urbano      MRN#: [id number]  Age: 23 year old      YOB: 2000      PPD #1 S/P  - Portuguese  used for duration of face-to-face  Patient feels well and is without issue, baby is rooming in  Breast feeding status: initiated and breastfeeding with formula supplementation per patient request  Complications since 2 hours post delivery: Fever, pt IS on antibiotics x 24 hours. Anemia, plan for IV iron.   Patient is tolerating regular diet,  tolerating acitivity well, voiding without difficulty, cramping is minimal and relieved by Tylenol and ibuprofen, lochia is decreasing, no clots.  Perineal pain is is minimal and is relieved by ice pack.  The perineum is intact.  Has not had BM, but passing gas and taking stool softener.        SIGNIFICANT PROBLEMS:  Patient Active Problem List    Diagnosis Date Noted    Encounter for triage in pregnant patient 10/01/2023     Priority: Medium    Labor and delivery indication for care or intervention 10/01/2023     Priority: Medium    Vaginal discharge during pregnancy in third trimester 2023     Priority: Medium    Vitamin D deficiency 2023     Priority: Medium     23: Recommended supplementation       Language barrier 2023     Priority: Medium     Communicates with [place]o        Supervision of high risk pregnancy in second trimester 2023     Priority: Medium     *moved from country- [place]*  *Dating from 24+1 US*  [facility] Patient Provider Group choice: CNM group  Partner's name: Mariusz  [x]NOB folder  [x]Dating- 24 +1 us, JERILYN completed  [] 1st trimester screening:n/a  []quad/afp: n/a  [x]Fetal anatomy US ordered- [facility] referral level 2 us placed   [x]Rubella immune  [x]Hep B immune  []Pap- need to ask, plan pp if needed  [x] No added risk for PRE-E  [x] No increased risk for GDM  []No need for utox in labor  []COVID vaccine completed  _____________________________________  []EOB folder  []PP  Contraception plan: If tubal,consent date:  []Labor plans: epidural  []:  [x]Infant feeding plan breast  []FLU shot  [x]TDAP given  [x]Rhogam if needed, NA  [x]TOLAC NA  [x] Water birth interest NO  [x]GCT, passed  ________________________________________  [] OTC PP meds sent  []PP plans, time off, support system discussed, resources offered  []Planning CS-ERAS pkt        Iron deficiency anemia, unspecified iron deficiency anemia type 06/22/2023     Priority: Medium     6/5: hbg 9.9, iron sent      6/21: vit c and b12 sent   Repeat with EOB labs, future order in place  7/7/23: Hgb 10.6 at EOB. Recommend continuing with supplementation or given persistent anemia in pregnancy could consider IV Fe infusions if patient prefers. Message sent to RN team.       Limited prenatal care in second trimester 06/22/2023     Priority: Medium     Had some prenatal care in [place] prior to moving    6/21: JERILYN completed,  to assist           PE:    Vitals:    10/01/23 2229 10/02/23 0101 10/02/23 0536 10/02/23 0752   BP:  117/72 111/68 100/76   BP Location:  Right arm  Left arm   Patient Position:    Semi-Elam's   Cuff Size:   Adult Regular Adult Regular   Pulse:  90 86 79   Resp:  16 16 16   Temp: 98.7  F (37.1  C) 98.6  F (37  C) 97.5  F (36.4  C) 98  F (36.7  C)   TempSrc:  Oral Oral Oral   SpO2:           NAD  Breasts: Soft, filling  Nipples: Intact, Non-tender  Abdomen: Soft, Non-tender      Uterus: Fundus Firm, Non-tender, located at the umbilicus   Lochia: Rubra, appropriate amount    Perineum:  Well-approximated, healing well  Lower Extremities: no Edema Bilateral        Postpartum hemoglobin    Recent Labs   Lab 10/01/23  2120 10/01/23  1329   HGB 8.2* 9.8*     Blood type: B+  Rubella status - immune.       Assessment/Plan-   Stable Post-partum day #1  Complications: anemic, plan for IV iron and supplementation postpartum. Postpartum fever with plan for IV abx x24 hours.   Breast and formula feeding per  patient plan.   Rhophylac not indicated    Teaching done: desires review tomorrow.     Postpartum warning s/s reviewed, including bleeding/clots, fever, mastitis, or depression    Birthcontrol planned: mini pill / progesterone only pill or IUD (does not want anyone to know).  Review tomorrow.   Current Discharge Medication List        START taking these medications    Details   acetaminophen (TYLENOL) 325 MG tablet Take 2 tablets (650 mg) by mouth every 4 hours as needed for mild pain or fever (greater than or equal to 38 C /100.4 F (oral) or 38.5 C/ 101.4 F (core).)  Qty: 90 tablet    Associated Diagnoses: Single liveborn infant delivered vaginally      docusate sodium (COLACE) 100 MG capsule Take 1 capsule (100 mg) by mouth daily  Qty: 90 capsule, Refills: 0    Associated Diagnoses: Single liveborn infant delivered vaginally      ibuprofen (ADVIL/MOTRIN) 800 MG tablet Take 1 tablet (800 mg) by mouth every 6 hours as needed for other (cramping)  Qty: 90 tablet, Refills: 0    Associated Diagnoses: Single liveborn infant delivered vaginally           CONTINUE these medications which have NOT CHANGED    Details   Cholecalciferol (VITAMIN D) 50 MCG (2000 UT) CAPS Take 2 capsules by mouth daily  Qty: 180 capsule, Refills: 3    Associated Diagnoses: Low vitamin D level      cyanocobalamin (VITAMIN B-12) 1000 MCG tablet Take 1 tablet (1,000 mcg) by mouth daily  Qty: 60 tablet, Refills: 1    Associated Diagnoses: Supervision of high risk pregnancy in second trimester      ferrous sulfate (FE TABS) 325 (65 Fe) MG EC tablet Take 1 tablet (325 mg) by mouth every other day  Qty: 90 tablet, Refills: 3    Associated Diagnoses: Intrauterine pregnancy      Prenatal Vit-Fe Fumarate-FA (PRENATAL MULTIVITAMIN W/IRON) 27-0.8 MG tablet Take 1 tablet by mouth daily  Qty: 90 tablet, Refills: 3    Associated Diagnoses: Intrauterine pregnancy      vitamin C (ASCORBIC ACID) 250 MG tablet Take 1 tablet (250 mg) by mouth daily  Qty: 60  tablet, Refills: 1    Associated Diagnoses: Supervision of high risk pregnancy in second trimester             Routine Postpartum Management  Continue IV abx x 24 hours and ensure remains afebrile.   IV iron x 1 dose today, discharge with continued oral supplementation.   Encouraged Postpartum videos before discharge  Anticipate discharge to home tomorrow  RTC 2 week telephone call mood check and 6 week postpartum visit     JERAMY Honeycutt CNM

## 2023-10-02 NOTE — PROGRESS NOTES
Blood pressure 134/69, pulse 95, temperature (!) 102.2  F (39  C), resp. rate 16, SpO2 100 %, unknown if currently breastfeeding.  Patient Vitals for the past 24 hrs:   BP Temp Temp src Pulse Resp SpO2   10/01/23 2127 -- (!) 102.2  F (39  C) -- -- -- --   10/01/23 2005 134/69 (!) 101.6  F (38.7  C) Oral 95 16 --   10/01/23 1811 131/79 (!) 101  F (38.3  C) Oral 86 16 --   10/01/23 1659 104/58 -- -- -- -- --   10/01/23 1643 130/82 -- -- -- -- --   10/01/23 1628 120/74 -- -- -- -- --   10/01/23 1611 124/76 -- -- -- -- 100 %   10/01/23 1600 121/73 99  F (37.2  C) Oral -- -- 100 %   10/01/23 1541 127/79 -- -- -- -- --   10/01/23 1515 -- -- -- -- -- 98 %   10/01/23 1510 123/75 -- -- -- -- 99 %   10/01/23 1505 117/73 -- -- -- -- 100 %   10/01/23 1500 125/66 -- -- -- -- --   10/01/23 1458 -- 98.5  F (36.9  C) Oral -- 17 --   10/01/23 1455 126/65 -- -- -- 18 99 %   10/01/23 1450 128/67 -- -- -- 17 99 %   10/01/23 1445 -- -- -- -- -- 99 %   10/01/23 1444 124/65 -- -- -- 18 --   10/01/23 1439 125/64 -- -- -- 16 --   10/01/23 1435 -- -- -- -- -- 99 %   10/01/23 1433 124/61 -- -- -- 17 --   10/01/23 1431 126/76 -- -- -- 16 --   10/01/23 1430 -- -- -- -- 17 100 %   10/01/23 1429 124/79 -- -- -- 17 --   10/01/23 1427 130/78 -- -- -- 16 --   10/01/23 1425 130/81 -- -- -- 18 100 %   10/01/23 1423 128/79 -- -- -- 16 --   10/01/23 1421 126/77 -- -- -- 17 --   10/01/23 1419 125/81 -- -- -- 18 --   10/01/23 1415 -- -- -- -- -- 100 %   10/01/23 1410 -- -- -- -- -- 100 %   10/01/23 1405 -- -- -- -- -- 99 %   10/01/23 1400 -- 98.4  F (36.9  C) Oral -- 18 100 %   10/01/23 1355 -- -- -- -- 17 97 %   10/01/23 1301 131/70 98.3  F (36.8  C) Oral 85 19 --      per Ipad  General appearance: comfortable  Called to room for pt five hours postpartum with fever.  Pt had SROM clear fluid at 1100am today and began spont labor shortly thereafter. Epidural for labor analgesia   at 1535p, intact perineum, PPH 550cc- treated with IM  methergine and buccal misoprostol. Breast and bottlefeeding per pt request.    Pt sitting up in bed. Appears well, states she feels warm with mild HA. No chills or body aches.  Denies cough, congestion, ST. No one in her family is ill.  States abd not tender, bleeding has not been heavy, and able to urinate without dysuria.    EXAM:  Heart: RRR  Lungs: CTA  ABD: FF at U/1, small free flow of dark lochia with massage, no clots. Nontender with exam. No odor      ASSESSMENT:  ==============  Multip with  6 hours ago, febrile    PLAN:  ===========  Consult with Dr Vora. Plan IV Gentamicin and IV clindamycin now.  Stat CBC  Covid and respiratory panel.  Tylenol and motrin   Close observation  Reviewed with pt and family per . Agree to plan.   JERAMY You CNM

## 2023-10-02 NOTE — PHARMACY-AMINOGLYCOSIDE DOSING SERVICE
Pharmacy Aminoglycoside Initial Note  Date of Service 2023  Patient's  2000  23 year old, female    Weight (Adjusted):  56.6 kg    Indication:  endometritis    Current estimated CrCl = Estimated Creatinine Clearance: 117.2 mL/min (based on SCr of 0.66 mg/dL).    Creatinine for last 3 days  10/1/2023: 11:32 PM Creatinine 0.66 mg/dL     Nephrotoxins and other renal medications (From now, onward)      Start     Dose/Rate Route Frequency Ordered Stop    10/02/23 0900  gentamicin (GARAMYCIN) 85 mg in sodium chloride 0.9 % 100 mL intermittent infusion         1.5 mg/kg × 56.5 kg (Adjusted)  over 60 Minutes Intravenous EVERY 8 HOURS 10/02/23 0237      10/01/23 1558  ibuprofen (ADVIL/MOTRIN) tablet 800 mg         800 mg Oral EVERY 6 HOURS PRN 10/01/23 1600      10/01/23 1232  ketorolac (TORADOL) injection 30 mg        See Hyperspace for full Linked Orders Report.    30 mg Intravenous ONCE PRN 10/01/23 1234 10/06/23 1231    10/01/23 1232  ketorolac (TORADOL) injection 30 mg        See Hyperspace for full Linked Orders Report.    30 mg Intramuscular ONCE PRN 10/01/23 1234 10/06/23 1231    10/01/23 1232  ibuprofen (ADVIL/MOTRIN) tablet 800 mg        See Hyperspace for full Linked Orders Report.    800 mg Oral ONCE PRN 10/01/23 1234 10/06/23 1231            Contrast Orders - past 72 hours (72h ago, onward)      None            Aminoglycoside Levels - past 2 days  No results found for requested labs within last 2 days.    Aminoglycosides IV Administrations (past 72 hours)                     gentamicin (GARAMYCIN) 85 mg in sodium chloride 0.9 % 50 mL intermittent infusion (mg) 85 mg New Bag 10/02/23 0105                        Plan:  1.  Start Gentamicin 85 mg (1.5 mg/kg) IV q8h.   2.  Target goals based on conventional dosing  3.  Goal peak level: 4-6 mg/L  4.  Goal trough level: <1 mg/L  5.  Pharmacy will continue to follow and check levels as appropriate in 1-3 Days      Cy German, [facility]

## 2023-10-03 NOTE — PROGRESS NOTES
2023  Pt given discharge instructions and baby's vitamin D. Explained follow up visits and when to call 911.  Questions answered and instructed to  meds from pharmacy.    2043 Pt wheeled out in WC by NST, accompanied by spouse and mother-in-law.

## 2023-10-03 NOTE — PROGRESS NOTES
Shift summary:  VSS.  Pt comfortable.  Family present at bedside and supportive.  Droplet precautions maintained.  [employer] ipad  used today.  Pt offered LSCW visit.  Pt declined.  SANTIAGO Carter CNM updated on pt and saw pt.  Pt became agreeable with iron transfusion and receiving an additional dose of clinda IV and Gent IV.  Last abx to be given at 1800.  Pt and CNM agreeable with plan.  Pt tolerated iron infusion well.  VSS.  Comfortable.  Received 1800 dose of gent IV.  IV removed.  Pt and family stated readiness for discharge to home with .  FF. Scant flow.  Voiding.  No reported dizziness.  Eating/drinking.  Shift change report to KANDY Harris.

## 2023-10-19 ENCOUNTER — ALLIED HEALTH/NURSE VISIT (OUTPATIENT)
Dept: FAMILY MEDICINE | Facility: CLINIC | Age: 23
End: 2023-10-19
Payer: COMMERCIAL

## 2023-10-19 DIAGNOSIS — Z23 NEED FOR PROPHYLACTIC VACCINATION AND INOCULATION AGAINST INFLUENZA: Primary | ICD-10-CM

## 2023-10-19 PROCEDURE — 90746 HEPB VACCINE 3 DOSE ADULT IM: CPT

## 2023-10-19 PROCEDURE — 90707 MMR VACCINE SC: CPT

## 2023-10-19 PROCEDURE — 90472 IMMUNIZATION ADMIN EACH ADD: CPT

## 2023-10-19 PROCEDURE — 90716 VAR VACCINE LIVE SUBQ: CPT

## 2023-10-19 PROCEDURE — 90686 IIV4 VACC NO PRSV 0.5 ML IM: CPT

## 2023-10-19 PROCEDURE — 99207 PR NO CHARGE NURSE ONLY: CPT

## 2023-10-19 PROCEDURE — 90471 IMMUNIZATION ADMIN: CPT

## 2023-11-13 ENCOUNTER — MEDICAL CORRESPONDENCE (OUTPATIENT)
Dept: HEALTH INFORMATION MANAGEMENT | Facility: CLINIC | Age: 23
End: 2023-11-13

## 2023-11-13 ENCOUNTER — OFFICE VISIT (OUTPATIENT)
Dept: FAMILY MEDICINE | Facility: CLINIC | Age: 23
End: 2023-11-13
Payer: COMMERCIAL

## 2023-11-13 VITALS
HEART RATE: 73 BPM | HEIGHT: 61 IN | RESPIRATION RATE: 18 BRPM | SYSTOLIC BLOOD PRESSURE: 99 MMHG | OXYGEN SATURATION: 99 % | WEIGHT: 129 LBS | BODY MASS INDEX: 24.35 KG/M2 | DIASTOLIC BLOOD PRESSURE: 51 MMHG

## 2023-11-13 DIAGNOSIS — D50.9 IRON DEFICIENCY ANEMIA, UNSPECIFIED IRON DEFICIENCY ANEMIA TYPE: ICD-10-CM

## 2023-11-13 DIAGNOSIS — Z76.89 ESTABLISHING CARE WITH NEW DOCTOR, ENCOUNTER FOR: Primary | ICD-10-CM

## 2023-11-13 DIAGNOSIS — F48.9 POOR MENTAL HEALTH: ICD-10-CM

## 2023-11-13 DIAGNOSIS — R42 DIZZINESS: ICD-10-CM

## 2023-11-13 DIAGNOSIS — G44.209 TENSION-TYPE HEADACHE, NOT INTRACTABLE, UNSPECIFIED CHRONICITY PATTERN: ICD-10-CM

## 2023-11-13 LAB
BASOPHILS # BLD AUTO: 0 10E3/UL (ref 0–0.2)
BASOPHILS NFR BLD AUTO: 0 %
EOSINOPHIL # BLD AUTO: 0.1 10E3/UL (ref 0–0.7)
EOSINOPHIL NFR BLD AUTO: 2 %
ERYTHROCYTE [DISTWIDTH] IN BLOOD BY AUTOMATED COUNT: 15.9 % (ref 10–15)
HCT VFR BLD AUTO: 33.5 % (ref 35–47)
HGB BLD-MCNC: 10.3 G/DL (ref 11.7–15.7)
IMM GRANULOCYTES # BLD: 0 10E3/UL
IMM GRANULOCYTES NFR BLD: 0 %
LYMPHOCYTES # BLD AUTO: 1.9 10E3/UL (ref 0.8–5.3)
LYMPHOCYTES NFR BLD AUTO: 28 %
MCH RBC QN AUTO: 27 PG (ref 26.5–33)
MCHC RBC AUTO-ENTMCNC: 30.7 G/DL (ref 31.5–36.5)
MCV RBC AUTO: 88 FL (ref 78–100)
MONOCYTES # BLD AUTO: 0.4 10E3/UL (ref 0–1.3)
MONOCYTES NFR BLD AUTO: 6 %
NEUTROPHILS # BLD AUTO: 4.2 10E3/UL (ref 1.6–8.3)
NEUTROPHILS NFR BLD AUTO: 64 %
PLATELET # BLD AUTO: 271 10E3/UL (ref 150–450)
RBC # BLD AUTO: 3.82 10E6/UL (ref 3.8–5.2)
WBC # BLD AUTO: 6.6 10E3/UL (ref 4–11)

## 2023-11-13 PROCEDURE — 36415 COLL VENOUS BLD VENIPUNCTURE: CPT

## 2023-11-13 PROCEDURE — 99214 OFFICE O/P EST MOD 30 MIN: CPT | Mod: GC

## 2023-11-13 PROCEDURE — 80053 COMPREHEN METABOLIC PANEL: CPT

## 2023-11-13 PROCEDURE — 85025 COMPLETE CBC W/AUTO DIFF WBC: CPT

## 2023-11-13 RX ORDER — ACETAMINOPHEN 500 MG
500-1000 TABLET ORAL EVERY 6 HOURS PRN
Qty: 60 TABLET | Refills: 0 | Status: SHIPPED | OUTPATIENT
Start: 2023-11-13 | End: 2023-12-21
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
## 2023-11-13 NOTE — PROGRESS NOTES
Preceptor Attestation:  Patient's case reviewed and discussed with the resident, Jorje Linda MD, and I personally evaluated the patient. I agree with written assessment and plan of care.    Supervising Physician:  Gabriella Goins MD   [facility]

## 2023-11-14 LAB
ALBUMIN SERPL BCG-MCNC: 4.5 G/DL (ref 3.5–5.2)
ALP SERPL-CCNC: 95 U/L (ref 40–150)
ALT SERPL W P-5'-P-CCNC: 23 U/L (ref 0–50)
ANION GAP SERPL CALCULATED.3IONS-SCNC: 12 MMOL/L (ref 7–15)
AST SERPL W P-5'-P-CCNC: 26 U/L (ref 0–45)
BILIRUB SERPL-MCNC: 0.4 MG/DL
BUN SERPL-MCNC: 14.6 MG/DL (ref 6–20)
CALCIUM SERPL-MCNC: 9.5 MG/DL (ref 8.6–10)
CHLORIDE SERPL-SCNC: 108 MMOL/L (ref 98–107)
CREAT SERPL-MCNC: 0.49 MG/DL (ref 0.51–0.95)
DEPRECATED HCO3 PLAS-SCNC: 22 MMOL/L (ref 22–29)
EGFRCR SERPLBLD CKD-EPI 2021: >90 ML/MIN/1.73M2
GLUCOSE SERPL-MCNC: 98 MG/DL (ref 70–99)
POTASSIUM SERPL-SCNC: 4.3 MMOL/L (ref 3.4–5.3)
PROT SERPL-MCNC: 7.6 G/DL (ref 6.4–8.3)
SODIUM SERPL-SCNC: 142 MMOL/L (ref 135–145)

## 2023-11-20 ENCOUNTER — MYC MEDICAL ADVICE (OUTPATIENT)
Dept: OBGYN | Facility: CLINIC | Age: 23
End: 2023-11-20
Payer: COMMERCIAL

## 2023-11-20 DIAGNOSIS — Z30.011 ENCOUNTER FOR INITIAL PRESCRIPTION OF CONTRACEPTIVE PILLS: Primary | ICD-10-CM

## 2023-11-20 DIAGNOSIS — Z30.9 CONTRACEPTIVE MANAGEMENT: ICD-10-CM

## 2023-11-21 RX ORDER — ACETAMINOPHEN AND CODEINE PHOSPHATE 120; 12 MG/5ML; MG/5ML
0.35 SOLUTION ORAL DAILY
Qty: 84 TABLET | Refills: 3 | Status: SHIPPED | OUTPATIENT
Start: 2023-11-21 | End: 2023-12-21

## 2023-11-24 ENCOUNTER — MYC MEDICAL ADVICE (OUTPATIENT)
Dept: CARE COORDINATION | Facility: CLINIC | Age: 23
End: 2023-11-24
Payer: COMMERCIAL

## 2023-11-27 DIAGNOSIS — Z13.9 ENCOUNTER FOR SCREENING INVOLVING SOCIAL DETERMINANTS OF HEALTH (SDOH): ICD-10-CM

## 2023-11-27 DIAGNOSIS — F48.9 POOR MENTAL HEALTH: ICD-10-CM

## 2023-11-27 DIAGNOSIS — Z30.9 ENCOUNTER FOR CONTRACEPTIVE MANAGEMENT, UNSPECIFIED TYPE: Primary | ICD-10-CM

## 2023-11-27 RX ORDER — ACETAMINOPHEN AND CODEINE PHOSPHATE 120; 12 MG/5ML; MG/5ML
0.35 SOLUTION ORAL DAILY
Qty: 365 TABLET | Refills: 0 | Status: SHIPPED | OUTPATIENT
Start: 2023-11-27 | End: 2023-12-21
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
## 2023-11-30 ENCOUNTER — PATIENT OUTREACH (OUTPATIENT)
Dept: CARE COORDINATION | Facility: CLINIC | Age: 23
End: 2023-11-30
Payer: COMMERCIAL
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
## 2023-12-21 DIAGNOSIS — F48.9 POOR MENTAL HEALTH: ICD-10-CM

## 2023-12-21 DIAGNOSIS — G44.209 TENSION-TYPE HEADACHE, NOT INTRACTABLE, UNSPECIFIED CHRONICITY PATTERN: ICD-10-CM

## 2023-12-21 DIAGNOSIS — Z30.9 ENCOUNTER FOR CONTRACEPTIVE MANAGEMENT, UNSPECIFIED TYPE: ICD-10-CM

## 2023-12-21 DIAGNOSIS — Z30.011 ENCOUNTER FOR INITIAL PRESCRIPTION OF CONTRACEPTIVE PILLS: ICD-10-CM
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
## 2023-12-21 NOTE — TELEPHONE ENCOUNTER
"Message to physician:     Date of last visit: 11/13/2023    Date of next visit if scheduled:     Potassium   Date Value Ref Range Status   11/13/2023 4.3 3.4 - 5.3 mmol/L Final     Creatinine   Date Value Ref Range Status   11/13/2023 0.49 (L) 0.51 - 0.95 mg/dL Final     GFR Estimate   Date Value Ref Range Status   11/13/2023 >90 >60 mL/min/1.73m2 Final       BP Readings from Last 3 Encounters:   11/13/23 99/51   10/02/23 115/70   09/30/23 108/60       No results found for: \"A1C\"    Please complete refill and CLOSE ENCOUNTER.  Closing the encounter signifies the refill is complete.    "

## 2023-12-27 RX ORDER — ACETAMINOPHEN AND CODEINE PHOSPHATE 120; 12 MG/5ML; MG/5ML
0.35 SOLUTION ORAL DAILY
Qty: 365 TABLET | Refills: 0 | Status: SHIPPED | OUTPATIENT
Start: 2023-12-27 | End: 2024-09-11

## 2023-12-27 RX ORDER — ACETAMINOPHEN 500 MG
500-1000 TABLET ORAL EVERY 6 HOURS PRN
Qty: 60 TABLET | Refills: 0 | Status: SHIPPED | OUTPATIENT
Start: 2023-12-27 | End: 2024-08-23

## 2023-12-27 RX ORDER — ACETAMINOPHEN AND CODEINE PHOSPHATE 120; 12 MG/5ML; MG/5ML
0.35 SOLUTION ORAL DAILY
Qty: 84 TABLET | Refills: 3 | Status: SHIPPED | OUTPATIENT
Start: 2023-12-27 | End: 2024-09-11
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
## 2023-12-29 ENCOUNTER — OFFICE VISIT (OUTPATIENT)
Dept: FAMILY MEDICINE | Facility: CLINIC | Age: 23
End: 2023-12-29
Payer: COMMERCIAL

## 2023-12-29 VITALS
RESPIRATION RATE: 18 BRPM | SYSTOLIC BLOOD PRESSURE: 115 MMHG | DIASTOLIC BLOOD PRESSURE: 59 MMHG | WEIGHT: 124 LBS | OXYGEN SATURATION: 99 % | HEART RATE: 88 BPM | TEMPERATURE: 98.4 F

## 2023-12-29 DIAGNOSIS — J06.9 VIRAL URI: Primary | ICD-10-CM

## 2023-12-29 PROCEDURE — 99213 OFFICE O/P EST LOW 20 MIN: CPT | Performed by: FAMILY MEDICINE

## 2023-12-29 RX ORDER — ACETAMINOPHEN 500 MG
500 TABLET ORAL EVERY 6 HOURS PRN
Qty: 120 TABLET | Refills: 11 | Status: SHIPPED | OUTPATIENT
Start: 2023-12-29

## 2023-12-29 NOTE — PROGRESS NOTES
OUTPATIENT VISIT NOTE                                                   Date of Visit: 12/29/2023     Chief Complaint   Patient presents with:  Fever: Sx started 2 day. Cough. Fever. Runny nose . Chills.            History of Present Illness   Cathleen Urbano is a 23 year old female with  by phone has been sick for two days with cough.  Has some chest pain with coughing.  Runny nose.  Has felt hot.  Mild sore throat.  No ear pain.  No stomach upset.    No medication taken.  3 months postpartum.  Not breast feeding.    Children are all sick with similar symptoms.           MEDICATIONS   Current Outpatient Medications   Medication    acetaminophen (TYLENOL) 500 MG tablet     No current facility-administered medications for this visit.         SOCIAL HISTORY   Social History     Tobacco Use    Smoking status: Never    Smokeless tobacco: Never   Substance Use Topics    Alcohol use: Not on file           Physical Exam   Vitals:    12/29/23 1208   BP: 115/59   Pulse: 88   Resp: 18   Temp: 98.4  F (36.9  C)   TempSrc: Tympanic   SpO2: 99%   Weight: 56.2 kg (124 lb)        GENERAL:   Alert. Oriented.  EYES: Clear  HENT:  Ears: R TM pearly gray. Normal landmarks. L TM pearly gray.  Normal landmarks  Nose: Clear.  Sinuses: Nontender.  Oropharynx:  No erythema. No exudate.  NECK: Supple. No adenopathy.  LUNGS: Clear to ascultation.  No crackles.  No wheezing  HEART: RRR  SKIN:  No rash.          Assessment and Plan     Viral URI  Appears well.  Symptomatic treatment as needed.  - acetaminophen (TYLENOL) 500 MG tablet  Dispense: 120 tablet; Refill: 11                 Discussed signs / symptoms that warrant urgent / emergent medical attention.   Recheck if worsening or not improving.       Haily Espinal MD          Pertinent History     The following portions of the patient's history were reviewed and updated as appropriate: allergies, current medications, past family history, past medical history, past social history,  past surgical history and problem list.

## 2023-12-31 ENCOUNTER — HEALTH MAINTENANCE LETTER (OUTPATIENT)
Age: 23
End: 2023-12-31

## 2024-01-17 ENCOUNTER — MYC REFILL (OUTPATIENT)
Dept: FAMILY MEDICINE | Facility: CLINIC | Age: 24
End: 2024-01-17
Payer: COMMERCIAL

## 2024-01-17 DIAGNOSIS — Z30.9 ENCOUNTER FOR CONTRACEPTIVE MANAGEMENT, UNSPECIFIED TYPE: ICD-10-CM

## 2024-01-17 RX ORDER — ACETAMINOPHEN AND CODEINE PHOSPHATE 120; 12 MG/5ML; MG/5ML
0.35 SOLUTION ORAL DAILY
Qty: 365 TABLET | Refills: 0 | OUTPATIENT
Start: 2024-01-17

## 2024-01-18 NOTE — TELEPHONE ENCOUNTER
We received a refill request for patient's birth control pills however she received a year supply of refill in December so does not need more. If she is having issues with the pharmacy or picking this up please let us know.

## 2024-08-23 ENCOUNTER — OFFICE VISIT (OUTPATIENT)
Dept: FAMILY MEDICINE | Facility: CLINIC | Age: 24
End: 2024-08-23
Payer: COMMERCIAL

## 2024-08-23 VITALS
HEART RATE: 80 BPM | RESPIRATION RATE: 18 BRPM | TEMPERATURE: 98 F | SYSTOLIC BLOOD PRESSURE: 95 MMHG | OXYGEN SATURATION: 98 % | HEIGHT: 62 IN | DIASTOLIC BLOOD PRESSURE: 59 MMHG | BODY MASS INDEX: 22.94 KG/M2

## 2024-08-23 DIAGNOSIS — F32.0 MAJOR DEPRESSIVE DISORDER, SINGLE EPISODE, MILD (H): ICD-10-CM

## 2024-08-23 DIAGNOSIS — H10.13 ALLERGIC CONJUNCTIVITIS, BILATERAL: ICD-10-CM

## 2024-08-23 DIAGNOSIS — R42 DIZZINESS: Primary | ICD-10-CM

## 2024-08-23 LAB
ERYTHROCYTE [DISTWIDTH] IN BLOOD BY AUTOMATED COUNT: 12.3 % (ref 10–15)
HCT VFR BLD AUTO: 35.7 % (ref 35–47)
HGB BLD-MCNC: 11.3 G/DL (ref 11.7–15.7)
MCH RBC QN AUTO: 29 PG (ref 26.5–33)
MCHC RBC AUTO-ENTMCNC: 31.7 G/DL (ref 31.5–36.5)
MCV RBC AUTO: 92 FL (ref 78–100)
PLATELET # BLD AUTO: 251 10E3/UL (ref 150–450)
RBC # BLD AUTO: 3.9 10E6/UL (ref 3.8–5.2)
WBC # BLD AUTO: 5 10E3/UL (ref 4–11)

## 2024-08-23 PROCEDURE — 36415 COLL VENOUS BLD VENIPUNCTURE: CPT

## 2024-08-23 PROCEDURE — 84443 ASSAY THYROID STIM HORMONE: CPT

## 2024-08-23 PROCEDURE — G2211 COMPLEX E/M VISIT ADD ON: HCPCS

## 2024-08-23 PROCEDURE — 99214 OFFICE O/P EST MOD 30 MIN: CPT | Mod: GC

## 2024-08-23 PROCEDURE — 80048 BASIC METABOLIC PNL TOTAL CA: CPT

## 2024-08-23 PROCEDURE — 85027 COMPLETE CBC AUTOMATED: CPT

## 2024-08-23 RX ORDER — KETOTIFEN FUMARATE 0.35 MG/ML
1 SOLUTION/ DROPS OPHTHALMIC 2 TIMES DAILY
Qty: 10 ML | Refills: 1 | Status: SHIPPED | OUTPATIENT
Start: 2024-08-23

## 2024-08-23 ASSESSMENT — PATIENT HEALTH QUESTIONNAIRE - PHQ9: SUM OF ALL RESPONSES TO PHQ QUESTIONS 1-9: 8

## 2024-08-23 NOTE — LETTER
August 27, 2024      Cathleen Urbano  [address] [place] [address]        Dear ,    We are writing to inform you of your test results.    Your electrolytes were normal (chloride and carbon dioxide levels are okay even though red). Your kidney is healthy.     Your glucose was low at 65 and so your low blood sugar could certainly be a cause for your dizziness. If you finds yourself dizzy, you should try orange juice or another fruit juice to see if that helps.     Your blood level (hgb) was slightly low at 11.3 but this is improved from last winter and is unlikely to be the cause for your dizziness.     We can check in about this at your next visit, but no major changes based on your testing.     Smooth Lowry MD         Resulted Orders   Basic metabolic panel   Result Value Ref Range    Sodium 140 135 - 145 mmol/L    Potassium 3.9 3.4 - 5.3 mmol/L    Chloride 109 (H) 98 - 107 mmol/L    Carbon Dioxide (CO2) 19 (L) 22 - 29 mmol/L    Anion Gap 12 7 - 15 mmol/L    Urea Nitrogen 9.6 6.0 - 20.0 mg/dL    Creatinine 0.49 (L) 0.51 - 0.95 mg/dL    GFR Estimate >90 >60 mL/min/1.73m2      Comment:      eGFR calculated using 2021 CKD-EPI equation.    Calcium 9.2 8.8 - 10.4 mg/dL      Comment:      Reference intervals for this test were updated on 7/16/2024 to reflect our healthy population more accurately. There may be differences in the flagging of prior results with similar values performed with this method. Those prior results can be interpreted in the context of the updated reference intervals.    Glucose 65 (L) 70 - 99 mg/dL   CBC with platelets   Result Value Ref Range    WBC Count 5.0 4.0 - 11.0 10e3/uL    RBC Count 3.90 3.80 - 5.20 10e6/uL    Hemoglobin 11.3 (L) 11.7 - 15.7 g/dL    Hematocrit 35.7 35.0 - 47.0 %    MCV 92 78 - 100 fL    MCH 29.0 26.5 - 33.0 pg    MCHC 31.7 31.5 - 36.5 g/dL    RDW 12.3 10.0 - 15.0 %    Platelet Count 251 150 - 450 10e3/uL   TSH with free T4 reflex   Result Value Ref Range     TSH 1.15 0.30 - 4.20 uIU/mL       If you have any questions or concerns, please call the clinic at the number listed above.       Sincerely,      Abraham Polk MD

## 2024-08-23 NOTE — PROGRESS NOTES
Assessment & Plan     (R42) Dizziness  (primary encounter diagnosis)  Comment: Patient has had stable but intermittent lightheadedness and dizziness on standing since she gave birth in October.  Her hemoglobin was in the 8 range on discharge from the hospital but came up to 10 a month thereafter.  Had a more mostly normal CBC and BMP 1 month after birth.  These issues have persisted and happen most of the time when she stands up.  Occasionally she has associated sweating and/or feeling very hot but does not have nausea.  Has never passed out.  She has been drinking okay but her appetite is decreased in the past month and she is only eating 1 meal a day due to lack of appetite.  Orthostatic vitals today and not positive for orthostatic hypotension.  I suspect that patient has some amount of symptomatic hypotension upon standing that could be related to lower hemoglobin and/or electrolyte abnormalities.  Will check a CBC, BMP, TSH today.  May require iron supplementation if anemic still.  Will have follow-up in 4 weeks as below and we can revisit this as well.  Plan: Basic metabolic panel, CBC with platelets, TSH         with free T4 reflex, ketotifen fumarate 0.035%         0.035 % SOLN ophthalmic solution, CANCELED: HCG        qualitative urine        -Will follow-up in 4 weeks and reevaluate   -Suspect that anemia may be playing a part but decreased appetite over the last month is likely worsening this as well   -I hope appetite improves as we start managing her depression as below    (F32.0) Major depressive disorder, single episode, mild (H24)  Comment: Patient was seen about 9 months ago for mental health concerns related to stresses from having to leave the country of [place] due to on rest and leaving some of her family behind.  Her PHQ-9 was a score of 8 today.  She has decreased energy, decreased appetite and little interest in doing things.  There was a mental health referral placed 9 months ago and  patient was advised to start Zoloft 50 mg.  She did not do either of those but is interested in those today  Plan: sertraline (ZOLOFT) 50 MG tablet, Adult Mental         Health [facility] Referral        -Reviewed side effects of Zoloft including black box warning and advised to stop if her mental health worsens or she starts feeling suicidal   -Follow-up in 4 weeks    PATIENT HEALTH QUESTIONNAIRE-9 (PHQ - 9)    Over the last 2 weeks, how often have you been bothered by any of the following problems?    1. Little interest or pleasure in doing things -  Several days   2. Feeling down, depressed, or hopeless -  Not at all   3. Trouble falling or staying asleep, or sleeping too much - Not at all   4. Feeling tired or having little energy -  Nearly every day   5. Poor appetite or overeating -  Nearly every day   6. Feeling bad about yourself - or that you are a failure or have let yourself or your family down -  Not at all   7. Trouble concentrating on things, such as reading the newspaper or watching television - Several days   8. Moving or speaking so slowly that other people could have noticed? Or the opposite - being so fidgety or restless that you have been moving around a lot more than usual Not at all   9. Thoughts that you would be better off dead or of hurting  yourself in some way Not at all   Total Score: 8     If you checked off any problems, how difficult have these problems made it for you to do your work, take care of things at home, or get along with other people?      Developed by Rossi Walker, Tracey Bahena, Aldo Benedict and colleagues, with an educational javy from [employer] No permission required to reproduce, translate, display or distribute. permission required to reproduce, translate, display or distribute.      (H10.13) Allergic conjunctivitis, bilateral  Comment: Patient with about a month of bilateral itching and irritating intermittently.  Read as well.  No other URI  "symptoms.  No mattering or eyes stuck shut in the morning.  This likely represents dry eye or some amount of allergic conjunctivitis.  Plan:    -Allergy eyedrops twice daily as needed            Return in about 4 weeks (around 9/20/2024).    Subjective   Cathleen is a 24 year old, presenting for the following health issues:  Eye Problem (Itchy eyes, burning and red) and Dizziness (Has bee having light headness for about 10-12 mo now)    HPI         Gave birth 9 months ago -   Light headed and dizzy. Intermittent. Happens upon standing regularly.     Diaphroesis/hot sometimes. No nausea.     Drinking okay but poor appetite for 1 month. Only eating one meal a day.     No prenatal multivitamin. No iron supplements    No complications. 8.1 hgb.       Eyes: itchy eyes. Intermittnetly. Sometimes red.     Mood: PHQ-0    Just taking tylenol        Money issues: lack of acess.   Breastfeeding          Objective    BP 95/59 (BP Location: Right arm, Patient Position: Sitting, Cuff Size: Adult Regular)   Pulse 80   Temp 98  F (36.7  C)   Resp 18   Ht 1.566 m (5' 1.65\")   LMP  (LMP Unknown)   SpO2 98%   BMI 22.94 kg/m    Body mass index is 22.94 kg/m .  Physical Exam   GENERAL: Healthy, alert and no distress  EYES: Eyes grossly normal to inspection.  No discharge or erythema, or obvious scleral/conjunctival abnormalities.  RESP:  Lungs clear throughout. No wheeze or crackles.   CV: Heart RRR. No murmur  Abdomen: Soft, nontender, nondistended.  MSK: No gross deformity. Normal tone.  SKIN: Visible skin clear. No significant rash, abnormal pigmentation or lesions.  NEURO: Cranial nerves grossly intact.  Mentation and speech appropriate for age.  PSYCH: Mentation appears normal, affect mildly blunted, judgement and insight intact, normal speech and appearance well-groomed.      No results found for this or any previous visit (from the past 24 hour(s)).        Signed Electronically by: Smooth Lowry MD    "

## 2024-08-24 LAB
ANION GAP SERPL CALCULATED.3IONS-SCNC: 12 MMOL/L (ref 7–15)
BUN SERPL-MCNC: 9.6 MG/DL (ref 6–20)
CALCIUM SERPL-MCNC: 9.2 MG/DL (ref 8.8–10.4)
CHLORIDE SERPL-SCNC: 109 MMOL/L (ref 98–107)
CREAT SERPL-MCNC: 0.49 MG/DL (ref 0.51–0.95)
EGFRCR SERPLBLD CKD-EPI 2021: >90 ML/MIN/1.73M2
GLUCOSE SERPL-MCNC: 65 MG/DL (ref 70–99)
HCO3 SERPL-SCNC: 19 MMOL/L (ref 22–29)
POTASSIUM SERPL-SCNC: 3.9 MMOL/L (ref 3.4–5.3)
SODIUM SERPL-SCNC: 140 MMOL/L (ref 135–145)
TSH SERPL DL<=0.005 MIU/L-ACNC: 1.15 UIU/ML (ref 0.3–4.2)

## 2024-08-27 NOTE — PROGRESS NOTES
Preceptor Attestation:   Patient seen, evaluated and discussed with the resident. I have verified the content of the note, which accurately reflects my assessment of the patient and the plan of care.    The longitudinal plan of care for the diagnosis(es)/condition(s) as documented were addressed during this visit. Due to the added complexity in care, I will continue to support Cathleen in the subsequent management and with ongoing continuity of care.    Supervising Physician:Abraham Polk MD    [facility]

## 2024-09-09 NOTE — PROGRESS NOTES
Assessment & Plan     Chronic bilateral low back pain without sciatica  Several months of sharp, non-radiating, middle-to-lower back pain, exacerbated by flexion and walking. She attributes it to epidural she received during labor ~9mo ago. Physical exam notable for paraspinal and bony process tenderness at upper L-spine, otherwise grossly normal. No red flag sx. Lumbar and thoracic spine imaging normal other than large stool burden on my clinic read, but will await final read by radiologist. Would expect epidural-related pain to present more acutely, rather than persisting for several months. Possibly muscle strain as patient is a mother of 3 young children and constantly chasing after them and doing housework and physical exam w/paraspinal tenderness consistent with this. Will pursue conservative management at this time.   - XR Thoracic Spine 2 Views; Future  - XR Lumbar Spine 2/3 Views; Future  - diclofenac (VOLTAREN) 1 % topical gel; Apply 2 g topically 4 times daily as needed for moderate pain.  - Offered PT, but patient would like to defer at this time. Provided stretches to try at home in the time being.   - Follow-up in 4-6wks    Dizziness  Major depressive disorder, single episode, mild (H24)  Reporting worsening lightheadedness and fatigue coinciding w/initiation of zoloft at previous visit. She thought this was for her dizziness. Provided education that zoloft was for mood and these were normal side effects. Possible that dose was too high for patient, so will titrate down to see if this will make a difference.   - Zoloft 50 >> 25mg d   - Therapy w/Dr. Mccarthy 9/25  - Follow-up scheduled for 10/11    Constipation, unspecified constipation type  Stool burden seen on spinal XR. She denies constipation, but I do wonder if she is more constipated than she reports based on imaging and this is contributing to pain.   - SENNA-docusate sodium (SENNA S) 8.6-50 MG tablet; Take 1 tablet by mouth at  "bedtime.    Subjective   Cathleen is a 24 year old, presenting for the following health issues:  Back Pain (After injection from giving birth. Gets pain when she walks. Has had pain for 9 months ) and Dizziness (Gets nauseous when takes medication they got prescribed for)        9/10/2024     2:02 PM   Additional Questions   Roomed by Benito   Accompanied by Son, Daughter         9/10/2024    Information    services provided? Yes   Language Other   Other Sami   Type of interpretation provided Telephone    ID [phone]    [facility]      HPI     Last seen by Dr. Lowry 8/23/2024  -Dizziness  -MDD - Zoloft 50mg d, therapy     Back pain   Location: Lower back, sharp - exacerbated by walking, has to lay down after a few steps  Duration: 9mo after epidural for birth   Radiation: No   Numbness/ Tingling?  No  Weakness? No  Bruising/swelling? No  Flexion or Extension bias: Not necessarily either  Red flags? No constipation, no issues w/urination  Meds tried? Tylenol - minimally helpful now   PT? No   Imaging? No  Dizziness - thinks new medicine is making it worst.       Review of Systems  Constitutional, HEENT, cardiovascular, pulmonary, gi and gu systems are negative, except as otherwise noted.      Objective    /74 (BP Location: Right arm, Patient Position: Sitting)   Pulse 105   Temp 97.5  F (36.4  C) (Oral)   Resp 18   Ht 1.555 m (5' 1.22\")   Wt 63 kg (139 lb)   LMP 08/27/2024 (Approximate)   SpO2 98%   BMI 26.08 kg/m    Body mass index is 26.08 kg/m .  Physical Exam   BACK:   ROM: flexion -full, though slow d/t pain /extension -full /lateral rotation- full /side bend- full   Bony tenderness: Mild tenderness @ L1-L2   Paraspinal tenderness: Mild tenderness @ L1-L2    Sensation: intact in b/l lower extremities.   Strength: 5/5 w/ dorsiflexion/ plantarflexion/ inversion/ eversion/ knee flexion/ extension.   Maneuvers: Neg straight leg " raise b/l.   Neuro: DTR's 2+. Babinski's downgoing. Neg Clonus         Signed Electronically by: Jorje Linda MD

## 2024-09-10 ENCOUNTER — OFFICE VISIT (OUTPATIENT)
Dept: FAMILY MEDICINE | Facility: CLINIC | Age: 24
End: 2024-09-10
Payer: COMMERCIAL

## 2024-09-10 ENCOUNTER — ANCILLARY PROCEDURE (OUTPATIENT)
Dept: GENERAL RADIOLOGY | Facility: CLINIC | Age: 24
End: 2024-09-10
Attending: FAMILY MEDICINE
Payer: COMMERCIAL

## 2024-09-10 ENCOUNTER — PATIENT OUTREACH (OUTPATIENT)
Dept: CARE COORDINATION | Facility: CLINIC | Age: 24
End: 2024-09-10

## 2024-09-10 VITALS
OXYGEN SATURATION: 98 % | SYSTOLIC BLOOD PRESSURE: 116 MMHG | HEART RATE: 105 BPM | HEIGHT: 61 IN | WEIGHT: 139 LBS | TEMPERATURE: 97.5 F | BODY MASS INDEX: 26.24 KG/M2 | RESPIRATION RATE: 18 BRPM | DIASTOLIC BLOOD PRESSURE: 74 MMHG

## 2024-09-10 DIAGNOSIS — M54.50 CHRONIC BILATERAL LOW BACK PAIN WITHOUT SCIATICA: ICD-10-CM

## 2024-09-10 DIAGNOSIS — K59.00 CONSTIPATION, UNSPECIFIED CONSTIPATION TYPE: ICD-10-CM

## 2024-09-10 DIAGNOSIS — F32.0 MAJOR DEPRESSIVE DISORDER, SINGLE EPISODE, MILD (H): ICD-10-CM

## 2024-09-10 DIAGNOSIS — G89.29 CHRONIC BILATERAL LOW BACK PAIN WITHOUT SCIATICA: Primary | ICD-10-CM

## 2024-09-10 DIAGNOSIS — M54.50 CHRONIC BILATERAL LOW BACK PAIN WITHOUT SCIATICA: Primary | ICD-10-CM

## 2024-09-10 DIAGNOSIS — R42 DIZZINESS: ICD-10-CM

## 2024-09-10 DIAGNOSIS — G89.29 CHRONIC BILATERAL LOW BACK PAIN WITHOUT SCIATICA: ICD-10-CM

## 2024-09-10 PROCEDURE — 72070 X-RAY EXAM THORAC SPINE 2VWS: CPT | Mod: TC | Performed by: RADIOLOGY

## 2024-09-10 PROCEDURE — 72100 X-RAY EXAM L-S SPINE 2/3 VWS: CPT | Mod: TC | Performed by: RADIOLOGY

## 2024-09-10 PROCEDURE — 99214 OFFICE O/P EST MOD 30 MIN: CPT | Mod: GC

## 2024-09-10 RX ORDER — KETOTIFEN FUMARATE 0.35 MG/ML
1 SOLUTION/ DROPS OPHTHALMIC 2 TIMES DAILY
Qty: 10 ML | Status: CANCELLED | OUTPATIENT
Start: 2024-09-10

## 2024-09-10 RX ORDER — SERTRALINE HYDROCHLORIDE 25 MG/1
25 TABLET, FILM COATED ORAL DAILY
Qty: 90 TABLET | Refills: 1 | Status: SHIPPED | OUTPATIENT
Start: 2024-09-10 | End: 2024-10-04

## 2024-09-10 RX ORDER — SENNA AND DOCUSATE SODIUM 50; 8.6 MG/1; MG/1
1 TABLET, FILM COATED ORAL AT BEDTIME
Qty: 60 TABLET | Refills: 0 | Status: SHIPPED | OUTPATIENT
Start: 2024-09-10

## 2024-09-10 NOTE — PROGRESS NOTES
Clinic Care Coordination Contact  Follow Up Progress Note      Assessment: I called the pt to help setup a therapist appointment. I called and talked to the pt , we were able to setup for the pt to see  on 09/25/2024 at 2:00pm.     Care Gaps:    Health Maintenance Due   Topic Date Due    YEARLY PREVENTIVE VISIT  Never done    ADVANCE CARE PLANNING  Never done    DEPRESSION ACTION PLAN  Never done    HPV IMMUNIZATION (1 - 3-dose series) Never done    PAP  Never done    HEPATITIS B IMMUNIZATION (2 of 3 - 19+ 3-dose series) 11/16/2023    INFLUENZA VACCINE (1) 09/01/2024    COVID-19 Vaccine (2 - 2023-24 season) 09/01/2024

## 2024-09-10 NOTE — PROGRESS NOTES
Preceptor Attestation:   Patient seen, evaluated and discussed with the resident. I have verified the content of the note, which accurately reflects my assessment of the patient and the plan of care.    Supervising Physician:Alisa Clark MD    [facility]

## 2024-10-04 ENCOUNTER — OFFICE VISIT (OUTPATIENT)
Dept: FAMILY MEDICINE | Facility: CLINIC | Age: 24
End: 2024-10-04
Payer: COMMERCIAL

## 2024-10-04 VITALS
DIASTOLIC BLOOD PRESSURE: 58 MMHG | HEIGHT: 62 IN | WEIGHT: 132 LBS | RESPIRATION RATE: 18 BRPM | SYSTOLIC BLOOD PRESSURE: 98 MMHG | TEMPERATURE: 97.9 F | BODY MASS INDEX: 24.29 KG/M2 | HEART RATE: 79 BPM | OXYGEN SATURATION: 99 %

## 2024-10-04 DIAGNOSIS — J02.9 SORE THROAT: ICD-10-CM

## 2024-10-04 DIAGNOSIS — Z32.00 PREGNANCY EXAMINATION OR TEST, PREGNANCY UNCONFIRMED: Primary | ICD-10-CM

## 2024-10-04 DIAGNOSIS — O21.9 NAUSEA AND VOMITING DURING PREGNANCY: ICD-10-CM

## 2024-10-04 LAB
DEPRECATED S PYO AG THROAT QL EIA: NEGATIVE
GROUP A STREP BY PCR: NOT DETECTED
HCG UR QL: POSITIVE

## 2024-10-04 PROCEDURE — 99214 OFFICE O/P EST MOD 30 MIN: CPT | Mod: GC

## 2024-10-04 PROCEDURE — 81025 URINE PREGNANCY TEST: CPT

## 2024-10-04 PROCEDURE — 87651 STREP A DNA AMP PROBE: CPT

## 2024-10-04 RX ORDER — PRENATAL VIT/IRON FUM/FOLIC AC 27MG-0.8MG
1 TABLET ORAL DAILY
Qty: 90 TABLET | Refills: 3 | Status: SHIPPED | OUTPATIENT
Start: 2024-10-04

## 2024-10-04 RX ORDER — PYRIDOXINE HCL (VITAMIN B6) 25 MG
25 TABLET ORAL DAILY
Qty: 90 TABLET | Refills: 0 | Status: SHIPPED | OUTPATIENT
Start: 2024-10-04

## 2024-10-04 ASSESSMENT — PATIENT HEALTH QUESTIONNAIRE - PHQ9: SUM OF ALL RESPONSES TO PHQ QUESTIONS 1-9: 4

## 2024-10-04 NOTE — Clinical Note
Jeremie Chambers and Katty,   Just BENJAMÍN - Met with Cathleen today who is confirmed pregnant and would like to carryout this pregnancy with our clinic. I've heard mixed stories about her OB hx. ~7w0d by approximate LMP. She has 3 children in total, unclear if the first one biologically her own. If you can clarify with her during the uptake would appreciate it!  Thanks, H

## 2024-10-04 NOTE — PROGRESS NOTES
Assessment & Plan     Pregnancy examination or test, pregnancy unconfirmed  Nausea vomiting during pregnancy   LMP 1.5mo ago, experiencing n/v and stomaches. Home pregnancy test positive. Confirmed positive here by UPT today - planned and desired. By approximate LMP, she is ~7w1d. , hx of iron deficiency anemia during pregnancy. Will need to clarify pregnancy hx w/patient as I have heard mixed stories. She would like to carryout prenatal cares and deliver with [facility].   - HCG qualitative urine; Future  - HCG qualitative urine  - Prenatal Vit-Fe Fumarate-FA (PRENATAL MULTIVITAMIN W/IRON) 27-0.8 MG tablet; Take 1 tablet by mouth daily.  - pyridOXINE (VITAMIN B6) 25 MG tablet; Take 1 tablet (25 mg) by mouth daily.  - Counseled eating smaller more frequent meals. Encouraged oral hydration.   - Patient counseled on discontinuing zoloft during pregnancy, especially since she does not feel like this is helpful for her mood. Feels like mood has been good overall. Will need to monitor mood.   - RN messaged for OB intake.     Sore throat  Patient reports ~1wk of tonsillar swelling, throat pain, and difficulty swallowing. Associated w/congestion, fevers, and cough. Examination of tonsils w/some erythema and edema, no exudates. Viral URI vs strep throat.   - Return precautions provided.   - Streptococcus A Rapid Screen w/Reflex to PCR  - Group A Streptococcus PCR Throat Swab      Subjective   Cathleen is a 24 year old, presenting for the following health issues:  Pregnancy Test (Stomach, fast heart-beating, headache. ), Symptoms, and Throat Pain (Swelling /)        10/4/2024     3:26 PM   Additional Questions   Roomed by Benito   Accompanied by self         10/4/2024    Information    services provided? Yes   Language Other   Other English   Type of interpretation provided Telephone    ID [id number]    Agency [facility]  Services      HPI     Presents  "for confirmation of pregnancy.   1mo poor appetite, dizziness - lightheadedness, bad heartburn. Nausea.   1 meal/d. Feels similar to previous pregnancies. Positive home test.     LMP? Aug 2024. Missed period for 1.5mo  Planned? Yes  Desired? Yes    If positive, patient does want to carry out pregnancy.     Where would she like to follow? [facility]     OB Hx:   - last pregnancy 1y ago. Delivered 10/1/2023.  Previous deliveries in daughter [place] and son in [place].   First child delivered in [place].     Vaginal births. No known hx of GDM, preeclampsia.   Iron deficiency anemia, postpartum endometritis    Throat pain - 1wk, swelling of tonsils which happens a lot when she is sick. Runny nose, cough, fevers.    Review of Systems  Constitutional, HEENT, cardiovascular, pulmonary, gi and gu systems are negative, except as otherwise noted.      Objective    BP 98/58 (BP Location: Right arm, Patient Position: Sitting)   Pulse 79   Temp 97.9  F (36.6  C) (Oral)   Resp 18   Ht 1.57 m (5' 1.81\")   Wt 59.9 kg (132 lb)   LMP 2024 (Approximate)   SpO2 99%   BMI 24.29 kg/m    Body mass index is 24.29 kg/m .  Physical Exam   GENERAL: Active, alert, in no acute distress.  LUNGS: Normal WOB, no respiratory distress  HEART: Appears well-perfused.  NECK: No adenopathy. Nontender to palpation.  MOUTH: Grade II tonsil inflammation, mild erythema. No bleeding or exudates appreciated.   EARS: Clear, TM translucent/gray bilaterally. Canals w/o erythema, edema.   MSK: ROM of all 4 extremities grossly intact, no BLE edema on gross examination   SKIN: No obvious lesions on gross examination  NEUROLOGIC: Normal tone throughout. Normal reflexes for age  PSYCH: Mood appropriate, normal affect          Signed Electronically by: Jorje Linda MD    "

## 2024-10-04 NOTE — LETTER
October 7, 2024      Cathleen Urbano  [address] [place] [address]        Dear ,    We are writing to inform you of your test results.    You're strep throat test was negative. This is likely a viral URI and should get better on its own in the few weeks to come. I would encourage you to keep hydrated and rest as needed.     Please let me know if the patient has any questions/concerns.     Best,   H     Resulted Orders   HCG qualitative urine   Result Value Ref Range    hCG Urine Qualitative Positive (A) Negative      Comment:      This test is for screening purposes.  Results should be interpreted along with the clinical picture.  Confirmation testing is available if warranted by ordering [id number], HCG Quantitative Pregnancy.   Streptococcus A Rapid Screen w/Reflex to PCR   Result Value Ref Range    Group A Strep antigen Negative Negative   Group A Streptococcus PCR Throat Swab   Result Value Ref Range    Group A strep by PCR Not Detected Not Detected    Narrative    The Xpert Xpress Strep A test, performed on the [employer]  Instrument Systems, is a rapid, qualitative in vitro diagnostic test for the detection of Streptococcus pyogenes (Group A ß-hemolytic Streptococcus, Strep A) in throat swab specimens from patients with signs and symptoms of pharyngitis. The Xpert Xpress Strep A test can be used as an aid in the diagnosis of Group A Streptococcal pharyngitis. The assay is not intended to monitor treatment for Group A Streptococcus infections. The Xpert Xpress Strep A test utilizes an automated real-time polymerase chain reaction (PCR) to detect Streptococcus pyogenes DNA.       If you have any questions or concerns, please call the clinic at the number listed above.       Sincerely,      Muriel Henderson MD

## 2024-10-09 NOTE — PROGRESS NOTES
Preceptor Attestation:   Patient seen, evaluated and discussed with the resident. I have verified the content of the note, which accurately reflects my assessment of the patient and the plan of care.    Supervising Physician:Muriel Henderson MD    [facility]

## 2024-10-18 ENCOUNTER — OFFICE VISIT (OUTPATIENT)
Dept: FAMILY MEDICINE | Facility: CLINIC | Age: 24
End: 2024-10-18
Payer: COMMERCIAL

## 2024-10-18 VITALS
SYSTOLIC BLOOD PRESSURE: 96 MMHG | TEMPERATURE: 97.8 F | DIASTOLIC BLOOD PRESSURE: 59 MMHG | OXYGEN SATURATION: 99 % | WEIGHT: 131 LBS | RESPIRATION RATE: 20 BRPM | HEART RATE: 93 BPM | BODY MASS INDEX: 24.11 KG/M2 | HEIGHT: 62 IN

## 2024-10-18 DIAGNOSIS — G89.29 CHRONIC BILATERAL LOW BACK PAIN WITHOUT SCIATICA: Primary | ICD-10-CM

## 2024-10-18 DIAGNOSIS — R10.819 SUPRAPUBIC TENDERNESS: ICD-10-CM

## 2024-10-18 DIAGNOSIS — M54.50 CHRONIC BILATERAL LOW BACK PAIN WITHOUT SCIATICA: Primary | ICD-10-CM

## 2024-10-18 DIAGNOSIS — L70.0 ACNE VULGARIS: ICD-10-CM

## 2024-10-18 LAB
ALBUMIN UR-MCNC: NEGATIVE MG/DL
APPEARANCE UR: CLEAR
BACTERIA #/AREA URNS HPF: ABNORMAL /HPF
BILIRUB UR QL STRIP: NEGATIVE
CLUE CELLS: PRESENT
COLOR UR AUTO: YELLOW
GLUCOSE UR STRIP-MCNC: NEGATIVE MG/DL
HGB UR QL STRIP: NEGATIVE
KETONES UR STRIP-MCNC: ABNORMAL MG/DL
LEUKOCYTE ESTERASE UR QL STRIP: ABNORMAL
NITRATE UR QL: NEGATIVE
PH UR STRIP: 6.5 [PH] (ref 5–7)
RBC #/AREA URNS AUTO: ABNORMAL /HPF
SP GR UR STRIP: 1.02 (ref 1–1.03)
SQUAMOUS #/AREA URNS AUTO: ABNORMAL /LPF
TRICHOMONAS, WET PREP: ABNORMAL
UROBILINOGEN UR STRIP-ACNC: 0.2 E.U./DL
WBC #/AREA URNS AUTO: ABNORMAL /HPF
WBC'S/HIGH POWER FIELD, WET PREP: ABNORMAL
YEAST, WET PREP: ABNORMAL

## 2024-10-18 PROCEDURE — 87210 SMEAR WET MOUNT SALINE/INK: CPT

## 2024-10-18 PROCEDURE — 99214 OFFICE O/P EST MOD 30 MIN: CPT | Mod: GC

## 2024-10-18 PROCEDURE — 87491 CHLMYD TRACH DNA AMP PROBE: CPT

## 2024-10-18 PROCEDURE — G2211 COMPLEX E/M VISIT ADD ON: HCPCS

## 2024-10-18 PROCEDURE — 87591 N.GONORRHOEAE DNA AMP PROB: CPT

## 2024-10-18 PROCEDURE — 81001 URINALYSIS AUTO W/SCOPE: CPT

## 2024-10-18 NOTE — PROGRESS NOTES
Assessment & Plan     Chronic bilateral low back pain without sciatica  Suprapubic tenderness  Patient is a 24 year-old at 9 weeks gestation who presents for evaluation of 3 days of low back pain usually at night that sometimes radiates to the abdomen. No fevers, urinary symptoms, GI symptoms, vaginal discharge/bleeding. VSS. Mild suprapubic tenderness on exam but otherwise no significant findings. Etiology of these symptoms unclear. Could be pregnancy related back pain, which she has dealt with in prior pregnancies. UTI or vaginal infection possible; UA, wet prep, G/C testing obtained. Less likely an acute intraabdominal source given well-appearing and presence of pain mostly at night. Will continue to monitor closely.   - UA Macroscopic with reflex to Microscopic and Culture; Future  - Chlamydia trachomatis/Neisseria gonorrhoeae by PCR - Clinic Collect  - Wet preparation  - UA Macroscopic with reflex to Microscopic and Culture  - UA Microscopic with Reflex to Culture    Acne vulgaris  Requests acne treatment today. Exam with several closed comedones on forehead and cheeks. Discussed that our acne treatment options are limited during pregnancy. Recommended salicylic acid face wash twice daily (ex CeraVe SA wash). Could consider azelaic acid at future visits if not improving with face washing and moisturizer.     No follow-ups on file.    Subjective   Cathleen is a 24 year old, presenting for the following health issues:  Back Pain      10/18/2024     3:10 PM   Additional Questions   Roomed by Eleuterio         10/18/2024    Information    services provided? Yes   Language Other   Other Romanian   Type of interpretation provided Telephone    name Gale    ID [id number]    Agency [facility]  Services        HPI     Currently pregnant. Approx 9 weeks.     Back pain and abdominal pain   Ongoing for 3 days.   Located in back and then radiates to abdomen when  "severe   Located across entire back   No constipation or diarrhea  No dysuria or hematuria   No change in urine pattern   No vaginal bleeding or discharge.   Comes and goes.   Occurs mostly at night.   Feeling weak.   No fevers.     Acne  Gets acne on forehead and cheeks.   Does not use any topical therapies.   Does not wash face with soap regularly.        Objective    BP 96/59   Pulse 93   Temp 97.8  F (36.6  C) (Oral)   Resp 20   Ht 1.575 m (5' 2\")   Wt 59.4 kg (131 lb)   LMP 08/27/2024 (Approximate)   SpO2 99%   Breastfeeding No   BMI 23.96 kg/m    Body mass index is 23.96 kg/m .  Physical Exam   GENERAL: alert and no distress  NECK: no adenopathy, no asymmetry, masses, or scars  RESP: lungs clear to auscultation - no rales, rhonchi or wheezes  CV: regular rate and rhythm, normal S1 S2, no S3 or S4, no murmur, click or rub, no peripheral edema  ABDOMEN: soft, mild suprapubic tenderness but otherwise nontender, no hepatosplenomegaly, no masses and bowel sounds normal  MS: no gross musculoskeletal defects noted, no edema  SKIN: several closed comedones present on forehead and cheeks      The longitudinal plan of care for the diagnosis(es)/condition(s) as documented were addressed during this visit. Due to the added complexity in care, I will continue to support Cathleen in the subsequent management and with ongoing continuity of care.    Signed Electronically by: Aide Reyes MD    "

## 2024-10-18 NOTE — PROGRESS NOTES
Preceptor Attestation:  Patient's case reviewed and discussed with the resident, Aide Reyes MD, and I personally evaluated the patient. I agree with written assessment and plan of care.    Supervising Physician:  Yesica Gonzalez MD   [facility]

## 2024-10-19 LAB
C TRACH DNA SPEC QL PROBE+SIG AMP: NEGATIVE
N GONORRHOEA DNA SPEC QL NAA+PROBE: NEGATIVE

## 2024-10-22 ENCOUNTER — TELEPHONE (OUTPATIENT)
Dept: FAMILY MEDICINE | Facility: CLINIC | Age: 24
End: 2024-10-22
Payer: COMMERCIAL

## 2024-10-22 NOTE — TELEPHONE ENCOUNTER
Writer called patient with [facility]  for OB intake. No answer, LVMTCB. Of note, writer attempted preferred number first and number went to [employer] voicemail. Writer attempted second listed home number and left VM on unidentified VM to call back. Jeb GAITAN

## 2024-11-06 ENCOUNTER — OFFICE VISIT (OUTPATIENT)
Dept: FAMILY MEDICINE | Facility: CLINIC | Age: 24
End: 2024-11-06
Payer: COMMERCIAL

## 2024-11-06 VITALS
HEART RATE: 90 BPM | TEMPERATURE: 98 F | HEIGHT: 62 IN | WEIGHT: 133 LBS | SYSTOLIC BLOOD PRESSURE: 98 MMHG | OXYGEN SATURATION: 98 % | DIASTOLIC BLOOD PRESSURE: 61 MMHG | RESPIRATION RATE: 20 BRPM | BODY MASS INDEX: 24.48 KG/M2

## 2024-11-06 DIAGNOSIS — Z34.91 PRENATAL CARE IN FIRST TRIMESTER: Primary | ICD-10-CM

## 2024-11-06 DIAGNOSIS — J02.9 SORE THROAT: ICD-10-CM

## 2024-11-06 LAB
ABO/RH(D): NORMAL
ANTIBODY SCREEN: NEGATIVE
ERYTHROCYTE [DISTWIDTH] IN BLOOD BY AUTOMATED COUNT: 12.9 % (ref 10–15)
HCT VFR BLD AUTO: 30.5 % (ref 35–47)
HGB BLD-MCNC: 10.2 G/DL (ref 11.7–15.7)
MCH RBC QN AUTO: 29.7 PG (ref 26.5–33)
MCHC RBC AUTO-ENTMCNC: 33.4 G/DL (ref 31.5–36.5)
MCV RBC AUTO: 89 FL (ref 78–100)
PLATELET # BLD AUTO: 224 10E3/UL (ref 150–450)
RBC # BLD AUTO: 3.43 10E6/UL (ref 3.8–5.2)
SPECIMEN EXPIRATION DATE: NORMAL
SPECIMEN EXPIRATION DATE: NORMAL
WBC # BLD AUTO: 7.4 10E3/UL (ref 4–11)

## 2024-11-06 PROCEDURE — 86850 RBC ANTIBODY SCREEN: CPT

## 2024-11-06 PROCEDURE — 99000 SPECIMEN HANDLING OFFICE-LAB: CPT

## 2024-11-06 PROCEDURE — 86762 RUBELLA ANTIBODY: CPT

## 2024-11-06 PROCEDURE — 83655 ASSAY OF LEAD: CPT | Mod: 90

## 2024-11-06 PROCEDURE — 86901 BLOOD TYPING SEROLOGIC RH(D): CPT

## 2024-11-06 PROCEDURE — 99213 OFFICE O/P EST LOW 20 MIN: CPT | Mod: GC

## 2024-11-06 PROCEDURE — 36415 COLL VENOUS BLD VENIPUNCTURE: CPT

## 2024-11-06 PROCEDURE — 87389 HIV-1 AG W/HIV-1&-2 AB AG IA: CPT

## 2024-11-06 PROCEDURE — 87340 HEPATITIS B SURFACE AG IA: CPT

## 2024-11-06 PROCEDURE — 86780 TREPONEMA PALLIDUM: CPT

## 2024-11-06 PROCEDURE — 85027 COMPLETE CBC AUTOMATED: CPT

## 2024-11-06 PROCEDURE — 99207 PR FIRST OB VISIT: CPT | Mod: GC

## 2024-11-06 PROCEDURE — 86900 BLOOD TYPING SEROLOGIC ABO: CPT

## 2024-11-06 NOTE — PROGRESS NOTES
Assessment & Plan     Prenatal care in first trimester  24F , approximately 11w5d by 2024 LMP.  Has not had initial OB visit yet.  Reports some nausea, but otherwise pregnancy has been going well. Denies abnormal vaginal discharge, bleeding, and abdominal pain.  Obtain routine prenatal labs today.  -RN met with patient for OB intake.  Dating U/S planned for tomorrow .  - ABO/Rh Type-HML; Future  - Antibody Screen; Future  - CBC with Plt; Future  - Culture Urine; Future  - Hepatitis B Surface Ag; Future  - HIV Ag/Ab Screen [place]; Future  - Lead, Blood; Future  - Rubella Antibody IgG; Future  - Syphilis Screen [place]; Future  - US OB < 14 weeks, single,  for dating ([id number]); Future  - ABO/Rh Type-HML  - Antibody Screen  - CBC with Plt  - Culture Urine  - Hepatitis B Surface Ag  - HIV Ag/Ab Screen [place]  - Lead, Blood  - Rubella Antibody IgG  - Syphilis Screen [place]    Sore throat  Patient reports 2 weeks of subjective fevers, congestion, cough that she attributes to seasonal allergies versus viral URI as her children have also been acutely ill. Likely both playing into this. Self-limited, expect this to resolve in the following week.  If not plan to follow-up and consider antihistamines that would be safe in pregnancy.  - Streptococcus A Rapid Screen w/Reflex to PCR - Clinic Collect  -Recommended flu/COVID/RSV swab as well but patient declined. She says she is a has been testing at home and also been vaccinated.    Return in 1 month (on 2024) for prenatal care.    Subjective   Cathleen is a 24 year old, presenting for the following health issues:  URI (Cold, sore throat , cough )        2024     3:32 PM   Additional Questions   Roomed by Malachi         2024    Information    services provided? Yes      HPI     Last seen by Dr. Reyes 10/18/2024 for back pain - thought to be related to pregnancy. UA neg, chlamydia/gonorrhoeae neg, wet prep neg.     Cold/URI  2  "wks.   Runny nose, congestion. Cough. HA at night. Subjective fevers.   Nasal polyp - removed in [facility] 5y ago.   \"Nodules in throat\"    Pregnancy  Currently, pregnant.  @ 11w5d by LMP 8/15/2024 (approx). Unable to contact for OB intake.       Review of Systems  Constitutional, HEENT, cardiovascular, pulmonary, gi and gu systems are negative, except as otherwise noted.      Objective    BP 98/61   Pulse 90   Temp 98  F (36.7  C) (Oral)   Resp 20   Ht 1.575 m (5' 2.01\")   Wt 60.3 kg (133 lb)   LMP 2024 (Approximate)   SpO2 98%   BMI 24.32 kg/m    Body mass index is 24.32 kg/m .  Physical Exam   GENERAL: Active, alert, in no acute distress.  LUNGS: Normal WOB, slight cough, no respiratory distress  HEART: Appears well-perfused.  HEENT: Throat clear, non-erythematous, unable to visualize tonsils. No masses/nodules palpated.   AB: Gravid.   MSK: ROM of all 4 extremities grossly intact, no BLE edema on gross examination   SKIN: No obvious lesions on gross examination  NEUROLOGIC: Normal tone throughout. Normal reflexes for age  PSYCH: Mood appropriate, normal affect          Signed Electronically by: Jorje Linda MD    "

## 2024-11-06 NOTE — NURSING NOTE
Name: Cathleen Urbano   Age: 23yo  :   Preferred language: Naida Salazar  Birthplace: [place]  Level of education: High school in [place]   status:   Occupation: Homemaker  LMP: 08/15/2024-approximate    FOB name: Juan Carlos Urbano  FOB age: 24  FOB occupation: business owner  FOB phone number: [phone]    Emergency contact name: Juan Carlos Urbano  Emergency contact number: [phone]  Emergency contact relation: Spouse    Previous pregnancy complications: Denies     Planned, Desired    Financial support: Yes  Support system:Yes: Spouse, mother in law, and two sister in laws  Substance/Alcohol use prior to pregnancy or knowing pregnant:No  Current substance/alcohol use:No  History of psychiatric concerns:No  Abuse/violence: Denies     Assigned OB Provider:   Estimated Due Date: Has dating US for 24    Average Risk Category  No significant risk factors: No    At Risk Category (up to 3)  Teen pregnancy: No  Poor social situation: No  Domestic abuse: No  Financial difficulties: No  Smoker: No  H/O  deliver: No  H/O drug abuse: No  Non-English speaking: Yes  Advanced maternal age: No  GDM risks: No  Previous C/S: No  H/O PIH: No  H/O STIs: No  H/O mental health concerns: No  Onset care > 20 weeks: No  Other:     High Risk Category (4 or more At Risk or)  Diabetes/GDM: No  Multiple gestation: No  Chronic hypertension: No  Significant hx of asthma: No  Fetal demise > 20 weeks: No  Positive tox screen: No  Current mental health treatment: No  Other:     Risk: At Risk  Date determined: 2028  Jeb GAITAN

## 2024-11-06 NOTE — PROGRESS NOTES
Preceptor Attestation:   Patient seen, evaluated and discussed with the resident Dr. Jorje Linda. I have verified the content of the note, which accurately reflects my assessment of the patient and the plan of care.    Supervising Physician:  Benjamin Rosenstein, MD, MA  [facility] Faculty  [facility]

## 2024-11-07 ENCOUNTER — ANCILLARY PROCEDURE (OUTPATIENT)
Dept: ULTRASOUND IMAGING | Facility: CLINIC | Age: 24
End: 2024-11-07
Attending: FAMILY MEDICINE
Payer: COMMERCIAL

## 2024-11-07 DIAGNOSIS — Z34.91 PRENATAL CARE IN FIRST TRIMESTER: ICD-10-CM

## 2024-11-07 LAB
HBV SURFACE AG SERPL QL IA: NONREACTIVE
HIV 1+2 AB+HIV1 P24 AG SERPL QL IA: NONREACTIVE
RUBV IGG SERPL QL IA: 16.1 INDEX
RUBV IGG SERPL QL IA: POSITIVE
T PALLIDUM AB SER QL: NONREACTIVE

## 2024-11-07 PROCEDURE — 76801 OB US < 14 WKS SINGLE FETUS: CPT | Mod: TC | Performed by: RADIOLOGY

## 2024-11-07 PROCEDURE — 87086 URINE CULTURE/COLONY COUNT: CPT

## 2024-11-07 NOTE — NURSING NOTE
Due to patient being non-English speaking/uses sign language, an  was used for this visit. Only for face-to-face interpretation by an external agency, date and length of interpretation can be found on the scanned worksheet.     name: #[id number]  Agency:  [employer]  Language:  Naida    Telephone number: [phone]  Type of interpretation: Telephone, spoken

## 2024-11-08 LAB — LEAD BLDV-MCNC: <2 UG/DL

## 2024-11-09 LAB — BACTERIA UR CULT: NORMAL

## 2024-11-11 DIAGNOSIS — Z34.91 PRENATAL CARE IN FIRST TRIMESTER: Primary | ICD-10-CM

## 2024-11-14 ENCOUNTER — OFFICE VISIT (OUTPATIENT)
Dept: FAMILY MEDICINE | Facility: CLINIC | Age: 24
End: 2024-11-14
Payer: COMMERCIAL

## 2024-11-14 VITALS
WEIGHT: 131 LBS | TEMPERATURE: 100.4 F | HEART RATE: 124 BPM | OXYGEN SATURATION: 98 % | BODY MASS INDEX: 24.11 KG/M2 | SYSTOLIC BLOOD PRESSURE: 96 MMHG | DIASTOLIC BLOOD PRESSURE: 53 MMHG | RESPIRATION RATE: 18 BRPM | HEIGHT: 62 IN

## 2024-11-14 DIAGNOSIS — Z3A.13 13 WEEKS GESTATION OF PREGNANCY: ICD-10-CM

## 2024-11-14 DIAGNOSIS — R30.0 DYSURIA: ICD-10-CM

## 2024-11-14 DIAGNOSIS — R10.9 FLANK PAIN IN PREGNANT PATIENT: ICD-10-CM

## 2024-11-14 DIAGNOSIS — A41.9 SEPSIS, DUE TO UNSPECIFIED ORGANISM, UNSPECIFIED WHETHER ACUTE ORGAN DYSFUNCTION PRESENT (H): Primary | ICD-10-CM

## 2024-11-14 DIAGNOSIS — R65.10 SIRS (SYSTEMIC INFLAMMATORY RESPONSE SYNDROME) (H): ICD-10-CM

## 2024-11-14 DIAGNOSIS — R05.1 ACUTE COUGH: ICD-10-CM

## 2024-11-14 DIAGNOSIS — R50.9 FEVER, UNSPECIFIED FEVER CAUSE: Primary | ICD-10-CM

## 2024-11-14 DIAGNOSIS — O26.899 FLANK PAIN IN PREGNANT PATIENT: ICD-10-CM

## 2024-11-14 DIAGNOSIS — R00.0 TACHYCARDIA: ICD-10-CM

## 2024-11-14 NOTE — PROGRESS NOTES
Assessment & Plan     SIRS criteria  Fever, unspecified fever cause  Tachycardia  Acute cough  Dysuria  Flank pain in pregnant patient  Very ill, toxic appearing in the office room. Tachycardic to 120s, confirmed on exam. BP 96 systolic. Febrile. Having worsened URI symptoms over the last two weeks after being diagnosed with URI at that time. Now having diffuse myalgias, abdominal pain, flank pain, dysuria. Meeting SIRS criteria, lungs clear, diffuse abdominal and low back pain on exam. Possible urosepsis, pneumonia that followed URI, or other infectious etiology. As patient is very ill appearing and having poor PO intake, recommended immediately transfer to ED for evaluation/treatment, labs/UA deferred to ED.  - Called [facility] while patient in transport via EMS, signed out to Dr. Pittman    13 weeks gestation of pregnancy  12 week ultrasound was completed, single intrauterine pregnancy. No bleeding or abnormal discharge from the vagina. Abdominal pain is diffuse, not significant change with palpation over the uterine fundus compared to upper quadrants. Low concern for threatened  at this time.    {FOLLOW UP PLANS (Optional) Includes COVID19 Treatment Plan:637105}    No follow-ups on file.      Subjective   Cathleen is a 24 year old, presenting for the following health issues:  Abdominal Pain (Stomach, back, both legs, pain /Coughing )  {(!) Visit Details have not yet been documented.  Please enter Visit Details and then use this list to pull in documentation. (Optional):492619}  HPI     Was seen on , for sore throat, 2 weeks of subjective fever (afebrile in clinic), congestion, cough and declined strept/viral swabs at that time. Treated as viral URI and anticipated resolution.     Flu like symptoms  - cough, subjective fever for the last two weeks, worsening now compared to last visit  - nonproductive cough  - very fatigued  - sore throat resolved  - runny nose  - chills  - sick kids at home, now symptoms  "resolved  - takes allergy meds, no relief  - no other meds used    Back pain, leg pain, sharp abdominal pain  Pregnant 13 weeks  - no vaginal bleeding or discharge  - lower back pain, diffuse across the back and new today  - painful urination new today  - leg pains, generalized    Only small bites of food today. No fluid intake. Nausea no vomiting.       {MA/LPN/RN Pre-Provider Visit Orders- hCG/UA/Strep (Optional):598516}  {SUPERLIST (Optional):489610}  {additonal problems for provider to add (Optional):125612}    {ROS Picklists (Optional):325993}      Objective    BP 96/53   Pulse (!) 124   Temp 100.4  F (38  C) (Tympanic)   Resp 18   Ht 1.58 m (5' 2.21\")   Wt 59.4 kg (131 lb)   LMP 08/27/2024 (Approximate)   SpO2 98%   BMI 23.80 kg/m    Body mass index is 23.8 kg/m .    Physical Exam   General: Acutely ill appearing, significant distress.  Head: Normocephalic, atraumatic.  Eyes: Symmetric pupils. No conjunctival injection.  Respiratory: No signs of respiratory distress. Clear to auscultation bilaterally.  Cardiovascular: Tachycardic, regular rhythm, no murmur or gallop. Normal S1 and S2. Appears well perfused.  Abd: Diffuse abdominal tenderness to palpation, slightly worse over the suprapubic region, no significant rebound tenderness or guarding.  Back: Tenderness to palpation across the lower back in the lumbar spine region, no clear CVA tenderness, no point tenderness over the spine.     {Exam List (Optional):590771}    {Diagnostic Test Results (Optional):425441}        Signed Electronically by: Boris Hawkins MD  {Email feedback regarding this note to [email or web address]   :420342}  "

## 2024-11-14 NOTE — PROGRESS NOTES
Preceptor Attestation:   Patient seen, evaluated and discussed with the resident. I have verified the content of the note, which accurately reflects my assessment of the patient and the plan of care. Patient with significant abdominal and lower back pain, notably distressed on exam.  Has had minimal PO intake, appears dehydrated.  Agree with plan for evaluation in ER for fever, tachycardia, abdominal pain - rule out pyelo, sepsis, abdominal infection. Less likely pneumonia.      Supervising Physician:Alisa Clark MD    [facility]

## 2024-12-05 ENCOUNTER — OFFICE VISIT (OUTPATIENT)
Dept: FAMILY MEDICINE | Facility: CLINIC | Age: 24
End: 2024-12-05
Payer: COMMERCIAL

## 2024-12-05 VITALS
OXYGEN SATURATION: 100 % | SYSTOLIC BLOOD PRESSURE: 101 MMHG | WEIGHT: 131 LBS | RESPIRATION RATE: 18 BRPM | TEMPERATURE: 98 F | HEIGHT: 62 IN | HEART RATE: 72 BPM | BODY MASS INDEX: 24.11 KG/M2 | DIASTOLIC BLOOD PRESSURE: 66 MMHG

## 2024-12-05 DIAGNOSIS — O26.12 INSUFFICIENT WEIGHT GAIN DURING PREGNANCY IN SECOND TRIMESTER: ICD-10-CM

## 2024-12-05 DIAGNOSIS — O99.019 ANTEPARTUM ANEMIA: ICD-10-CM

## 2024-12-05 DIAGNOSIS — Z34.92 PRENATAL CARE IN SECOND TRIMESTER: ICD-10-CM

## 2024-12-05 DIAGNOSIS — S30.0XXA LUMBAR CONTUSION, INITIAL ENCOUNTER: ICD-10-CM
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
## 2024-12-05 LAB
BACTERIAL VAGINOSIS VAG-IMP: POSITIVE
CANDIDA DNA VAG QL NAA+PROBE: NOT DETECTED
CANDIDA GLABRATA / CANDIDA KRUSEI DNA: NOT DETECTED
CRYSTALS AMN MICRO: NORMAL
T VAGINALIS DNA VAG QL NAA+PROBE: NOT DETECTED

## 2024-12-05 PROCEDURE — 0352U MULTIPLEX VAGINAL PANEL BY PCR: CPT

## 2024-12-05 PROCEDURE — 99214 OFFICE O/P EST MOD 30 MIN: CPT

## 2024-12-05 RX ORDER — ACETAMINOPHEN 500 MG
500-1000 TABLET ORAL EVERY 6 HOURS PRN
Qty: 120 TABLET | Refills: 11 | Status: SHIPPED | OUTPATIENT
Start: 2024-12-05

## 2024-12-05 NOTE — PROGRESS NOTES
"Cathleen Urbano is a 24 year old  female who presents to clinic for a follow up OB visit. She is currently 16w5d with an estimated date of delivery of 2025 via US @ 12w5d.     Last seen in [facility] on 2024  -COVID positive   -Cystitis - UA leuk est pos, treated. Finished.     Hgb 10.2    New concerns today:   Baby moving a lot previously. Twisted leg, fell on the stairs yesterday - feeling baby move less today. Noticed moderate amount of white discharge (did soak underwear) ~30min after, associated w/pain in belly and back. Still having ongoing leaking. Difficulty walking.       OB History    Para Term  AB Living   4 3 3 0 0 3   SAB IAB Ectopic Multiple Live Births   0 0 0 0 3      # Outcome Date GA Lbr Adrian/2nd Weight Sex Type Anes PTL Lv   4 Current            3 Term 10/01/23 40w0d 02:32 / 00:03 3.61 kg (7 lb 15.3 oz) M Vag-Spont EPI N CHAPARRITA      Name: STEPHANY,MALE-CATHLEEN      Apgar1: 9  Apgar5: 9   2 Term 22   2.22 kg (4 lb 14.3 oz) F Vag-Spont   CHAPARRITA   1 Term 21    F Vag-Spont   CHAPARRITA      Obstetric Comments   Report that their first daughter's birth documents do not reflect her real birthday, but are unsure of her actual date of birth.  They state it is close to 21.       They also report that both daughters were born full term, but do not recall the actual gestation, and report that birth weight was low- Mariusz pulled up a photo of their second daughter on the scale at her birth, and it was 2.22kg.            Physical exam:  /66 (BP Location: Right arm, Patient Position: Sitting)   Pulse 72   Temp 98  F (36.7  C) (Oral)   Resp 18   Ht 1.575 m (5' 2\")   Wt 59.4 kg (131 lb)   LMP 2024 (Approximate)   SpO2 100%   BMI 23.96 kg/m      Wt Readings from Last 12 Encounters:   24 59.4 kg (131 lb)   24 59.4 kg (131 lb)   24 60.3 kg (133 lb)   10/18/24 59.4 kg (131 lb)   10/04/24 59.9 kg (132 lb)   09/10/24 63 kg (139 lb)   23 56.2 kg (124 " lb)   23 58.5 kg (129 lb)   10/02/23 62.1 kg (136 lb 12.8 oz)   23 66.2 kg (146 lb)   23 62.4 kg (137 lb 9.6 oz)   23 61.6 kg (135 lb 14.4 oz)           General: alert, female in no acute distress  Abdomen: gravid uterus appropriate for gestation age above pubic symphysis, non-tender, FHTs 141  Extremities: no edema    Prenatal labs  Blood type: B POS  Hgb:   Hemoglobin   Date Value Ref Range Status   2024 10.2 (L) 11.7 - 15.7 g/dL Final     Pap: Never done  Hep B, HIV, syphilis, gonorrhea, chlamydia, HIV negative  Rubella immune  UA/Ucx normal on 2024, repeat at [facility] 2024 UA pos    Assessment/Plan:    (W19.XXXA) Fall, initial encounter  (primary encounter diagnosis)  (S30.0XXA) Lumbar contusion, initial encounter  Patient twisted her ankle and fell on the stairs yesterday. She fell on her back and rolled onto her abdomen. This was associated w/leakage of fluid, wetting her underwear and continuing to feel dribbling today. No blood. She reports feeling her baby move less today as well. She has tenderness to palpation of lumbar spine.  today, which is reassuring. Gait wnl.   - Fern testing neg  - Will pursue OB US to ensure fetal viability  - Multiplex vaginal PCR to r/o STI and/or vaginitis  - Patient reports completing abx course for cystitis from [facility]  -Tylenol 500 - 1000mg q6h PRN. She should not exceed 8 tablets in a day  -Return precautions provided       (Z34.92) Prenatal care in second trimester  (O99.019) Antepartum anemia  (O26.12) Insufficient weight gain in pregnancy.   @ 16w5d by US @ 12w5d. Lost ~9lb when compared to pregravid weight - has recently had poor appetite d/t acute illness (COVID+), but appetite is better now. Prenatal labs notable for hgb 10.2 - appears to have anemia at baseline. Unable to obtain additional labs to confirm this is iron def related d/t extensive discussion regarding fall above. Reports she is eating/drinking well.    - OB US > 14wk per above  -CBC, ferritin, retic count at next visit. Initiate oral iron supplement   -Will need to check-in regarding support system and social situation  -Follow-up next week    -Weight check   -Anemia   -Ask about genetic screening          Jorje Linda MD   [facility] Medicine Resident    Precepted with: Dr. Muriel Henderson

## 2024-12-09 DIAGNOSIS — B96.89 BACTERIAL VAGINOSIS IN PREGNANCY: Primary | ICD-10-CM

## 2024-12-09 DIAGNOSIS — O23.599 BACTERIAL VAGINOSIS IN PREGNANCY: Primary | ICD-10-CM

## 2024-12-09 RX ORDER — METRONIDAZOLE 500 MG/1
500 TABLET ORAL 2 TIMES DAILY
Qty: 14 TABLET | Refills: 0 | Status: SHIPPED | OUTPATIENT
Start: 2024-12-09 | End: 2024-12-16

## 2025-01-19 ENCOUNTER — HEALTH MAINTENANCE LETTER (OUTPATIENT)
Age: 25
End: 2025-01-19

## 2025-01-22 ENCOUNTER — TELEPHONE (OUTPATIENT)
Dept: FAMILY MEDICINE | Facility: CLINIC | Age: 25
End: 2025-01-22

## 2025-01-22 NOTE — TELEPHONE ENCOUNTER
"Per Dr. Linda, \"called pt to reschedule her for the GAEL\"     Called all the number that are listed on file but no one picked up.   "

## 2025-01-23 ENCOUNTER — OFFICE VISIT (OUTPATIENT)
Dept: FAMILY MEDICINE | Facility: CLINIC | Age: 25
End: 2025-01-23
Payer: COMMERCIAL

## 2025-01-23 VITALS
HEART RATE: 98 BPM | OXYGEN SATURATION: 98 % | SYSTOLIC BLOOD PRESSURE: 129 MMHG | DIASTOLIC BLOOD PRESSURE: 74 MMHG | TEMPERATURE: 98 F | RESPIRATION RATE: 18 BRPM

## 2025-01-23 DIAGNOSIS — Z34.92 PRENATAL CARE IN SECOND TRIMESTER: Primary | ICD-10-CM

## 2025-01-23 DIAGNOSIS — O99.012 ANEMIA IN PREGNANCY, SECOND TRIMESTER: ICD-10-CM

## 2025-01-23 PROCEDURE — 99207 PR PRENATAL VISIT: CPT | Mod: GC

## 2025-01-23 RX ORDER — PRENATAL VIT/IRON FUM/FOLIC AC 27MG-0.8MG
1 TABLET ORAL DAILY
Qty: 90 TABLET | Refills: 1 | Status: SHIPPED | OUTPATIENT
Start: 2025-01-23

## 2025-01-23 RX ORDER — FERROUS SULFATE 325(65) MG
325 TABLET ORAL
Qty: 90 TABLET | Refills: 1 | Status: SHIPPED | OUTPATIENT
Start: 2025-01-23

## 2025-01-23 NOTE — PROGRESS NOTES
Cathleen Urbano is a 24 year old  female who presents to clinic for a follow up OB visit. She is currently 23w5d with an estimated date of delivery of May 17, 2025 via 12w5d. She denies headache, chest pain, shortness of breath, abdominal pain/contractions, vaginal bleeding, or abnormal discharge. She has felt baby move. Taking PNV daily.     Follow-up:  Anemia - ran out of iron supplements.   BV - finished abx.     New concerns today: none    OB History    Para Term  AB Living   4 3 3 0 0 3   SAB IAB Ectopic Multiple Live Births   0 0 0 0 3      # Outcome Date GA Lbr Adrian/2nd Weight Sex Type Anes PTL Lv   4 Current            3 Term 10/01/23 40w0d 02:32 / 00:03 3.61 kg (7 lb 15.3 oz) M Vag-Spont EPI N CHAPARRITA      Name: STEPHANYMALE-CATHLEEN      Apgar1: 9  Apgar5: 9   2 Term 22   2.22 kg (4 lb 14.3 oz) F Vag-Spont   CHAPARRITA   1 Term 21    F Vag-Spont   CHAPARRITA      Obstetric Comments   Report that their first daughter's birth documents do not reflect her real birthday, but are unsure of her actual date of birth.  They state it is close to 21.       They also report that both daughters were born full term, but do not recall the actual gestation, and report that birth weight was low- Mariusz pulled up a photo of their second daughter on the scale at her birth, and it was 2.22kg.            Physical exam:  LMP 2024 (Approximate)     General: alert, female in no acute distress  Abdomen: gravid uterus appropriate for gestation age at 20.5 cm above pubic symphysis, non-tender, FHTs 134  Extremities: no edema    Assessment/Plan:  Patient Active Problem List   Diagnosis    Supervision of high risk pregnancy in second trimester    Iron deficiency anemia, unspecified iron deficiency anemia type    Limited prenatal care in second trimester    Language barrier    Vitamin D deficiency    Vaginal discharge during pregnancy in third trimester    Encounter for triage in pregnant patient    Labor and delivery  indication for care or intervention       Cathleen Urbano is a 24 year old  female at 23w5d here for routine OB visit. Prenatal course complicated by iron def anemia, poor weight gain.     (Z34.92) Prenatal care in second trimester  (primary encounter diagnosis)  - Measuring small today 20.5cm. Due for OB US >14wks  - 1 hour glucose tolerance test, CBC, RPR at next visit .  - Blood type B POS. Rhogam not indicated.  - TDAP to be given at 26-36 wks  - Discussed signs and symptoms of  labor.   - Evaluate for decreased fetal movement.   - Prenatal Vit-Fe Fumarate-FA (PRENATAL         MULTIVITAMIN W/IRON) 27-0.8 MG tablet, US OB >         14 Weeks    (O99.012) Anemia in pregnancy, second trimester  Plan: ferrous sulfate (FEROSUL) 325 (65 Fe) MG tablet        Follow up in 4 weeks for routine prenatal visit.     Jorje Linda MD   [facility] Family Medicine Resident    Precepted with: Dr. Alisa Clark

## 2025-01-30 ENCOUNTER — ANCILLARY PROCEDURE (OUTPATIENT)
Dept: ULTRASOUND IMAGING | Facility: CLINIC | Age: 25
End: 2025-01-30
Attending: FAMILY MEDICINE
Payer: COMMERCIAL

## 2025-01-30 DIAGNOSIS — Z34.92 PRENATAL CARE IN SECOND TRIMESTER: ICD-10-CM

## 2025-01-30 PROCEDURE — 76805 OB US >/= 14 WKS SNGL FETUS: CPT | Mod: TC | Performed by: RADIOLOGY

## 2025-01-30 NOTE — NURSING NOTE
Due to patient being non-English speaking/uses sign language, an  was used for this visit. Only for face-to-face interpretation by an external agency, date and length of interpretation can be found on the scanned worksheet.     name: Herrera #[id number]  Agency: [employer]  Language:  Naida    Telephone number: [phone]  Type of interpretation: Telephone, spoken

## 2025-03-06 ENCOUNTER — OFFICE VISIT (OUTPATIENT)
Dept: FAMILY MEDICINE | Facility: CLINIC | Age: 25
End: 2025-03-06
Payer: COMMERCIAL

## 2025-03-06 ENCOUNTER — TELEPHONE (OUTPATIENT)
Dept: FAMILY MEDICINE | Facility: CLINIC | Age: 25
End: 2025-03-06
Payer: COMMERCIAL

## 2025-03-06 VITALS
HEART RATE: 94 BPM | OXYGEN SATURATION: 96 % | DIASTOLIC BLOOD PRESSURE: 69 MMHG | WEIGHT: 140 LBS | BODY MASS INDEX: 25.76 KG/M2 | TEMPERATURE: 98.3 F | HEIGHT: 62 IN | RESPIRATION RATE: 20 BRPM | SYSTOLIC BLOOD PRESSURE: 114 MMHG

## 2025-03-06 DIAGNOSIS — R39.89 BLADDER PAIN: ICD-10-CM

## 2025-03-06 DIAGNOSIS — R10.9 FLANK PAIN: ICD-10-CM

## 2025-03-06 DIAGNOSIS — O09.92 SUPERVISION OF HIGH RISK PREGNANCY IN SECOND TRIMESTER: ICD-10-CM

## 2025-03-06 DIAGNOSIS — K59.00 CONSTIPATION, UNSPECIFIED CONSTIPATION TYPE: ICD-10-CM

## 2025-03-06 DIAGNOSIS — R10.84 ABDOMINAL PAIN, GENERALIZED: ICD-10-CM

## 2025-03-06 DIAGNOSIS — R10.2 VAGINAL PAIN: Primary | ICD-10-CM

## 2025-03-06 LAB
ALBUMIN UR-MCNC: NEGATIVE MG/DL
APPEARANCE UR: CLEAR
BACTERIA #/AREA URNS HPF: ABNORMAL /HPF
BILIRUB UR QL STRIP: NEGATIVE
CLUE CELLS: PRESENT
COLOR UR AUTO: YELLOW
GLUCOSE UR STRIP-MCNC: NEGATIVE MG/DL
HGB UR QL STRIP: NEGATIVE
KETONES UR STRIP-MCNC: NEGATIVE MG/DL
LEUKOCYTE ESTERASE UR QL STRIP: NEGATIVE
MUCOUS THREADS #/AREA URNS LPF: PRESENT /LPF
NITRATE UR QL: NEGATIVE
PH UR STRIP: 5.5 [PH] (ref 5–7)
RBC #/AREA URNS AUTO: ABNORMAL /HPF
SP GR UR STRIP: >=1.03 (ref 1–1.03)
SQUAMOUS #/AREA URNS AUTO: ABNORMAL /LPF
TRICHOMONAS, WET PREP: ABNORMAL
UROBILINOGEN UR STRIP-ACNC: 0.2 E.U./DL
WBC #/AREA URNS AUTO: ABNORMAL /HPF
WBC'S/HIGH POWER FIELD, WET PREP: ABNORMAL
YEAST, WET PREP: ABNORMAL

## 2025-03-06 RX ORDER — LIDOCAINE 40 MG/G
CREAM TOPICAL
Qty: 15 G | Refills: 1 | Status: SHIPPED | OUTPATIENT
Start: 2025-03-06

## 2025-03-06 RX ORDER — SENNOSIDES 8.6 MG
1 TABLET ORAL DAILY
Qty: 30 TABLET | Refills: 5 | Status: SHIPPED | OUTPATIENT
Start: 2025-03-06 | End: 2025-03-06

## 2025-03-06 RX ORDER — HYDROXYZINE PAMOATE 25 MG/1
1 CAPSULE ORAL AT BEDTIME
COMMUNITY
Start: 2025-03-02

## 2025-03-06 RX ORDER — ACETAMINOPHEN 325 MG/1
TABLET ORAL
COMMUNITY
Start: 2025-03-02

## 2025-03-06 RX ORDER — METRONIDAZOLE 500 MG/1
500 TABLET ORAL 2 TIMES DAILY
COMMUNITY
Start: 2025-03-02

## 2025-03-06 NOTE — TELEPHONE ENCOUNTER
Reached out and got a reach of spouse, Mariusz. Scheduled patient for EDF with Elli 3/6. Will help assist with GAEL appt + 1 hr gtt once patient arrives for afternoon appt with Elli.

## 2025-03-06 NOTE — PROGRESS NOTES
"  Assessment & Plan     (R10.2) Vaginal pain  (primary encounter diagnosis)  Comment: Differential includes infectious (treating BV seen again in repeat wet prep), Cystitis, no sign of trauma or herpetic lesions  Plan: UA with Microscopic, Wet preparation, Urine         Microscopic Exam, lidocaine (LMX4) 4 % external        cream  Trial of topical lidocaine as needed.  Recheck planned next week    (R39.89) Bladder pain  Comment: UA is unremarkable, no previous positive UC, patient states sister with kidney stones  Plan: Comprehensive metabolic panel    (R10.9) Flank pain  Comment: Will evaluate for perhaps some painful hydronephrosis, unsure if we'll be able to see stones, reassured by UA and lack of blood noted  Plan: US Abdomen Complete    (O09.92) Supervision of high risk pregnancy in second trimester  Comment: keep appt next week, consider repeat growth ultrasound, has had normal NST 3/2 normal heart rate    (R10.84) Abdominal pain, generalized  Comment: unsure if there is a component of hepatic or renal pain, possibly gallstones although quite atypical ultrasound to possibly evaluate.  Plan: US Abdomen Complete    (K59.00) Constipation, unspecified constipation type  Comment: patient states bowels irregular but has pain, unsure of oral intake and discuss with pcp  Plan:         Tablet, wanting something prn and would consider milk of magnesia.  Discuss at next.      67 minutes spent by me on the date of the encounter doing chart review, history and exam, documentation and further activities per the note    MED REC REQUIRED  Post Medication Reconciliation Status: discharge medications reconciled, continue medications without change  BMI  Estimated body mass index is 25.61 kg/m  as calculated from the following:    Height as of this encounter: 1.575 m (5' 2\").    Weight as of this encounter: 63.5 kg (140 lb).             No follow-ups on file.    Subjective   Cathleen is a 24 year old, presenting for the following " "health issues:  ER F/U (For back pain and bleeding. ) and Recheck Medication (For bleeding infection )      3/6/2025     1:36 PM   Additional Questions   Roomed by Paw   Accompanied by N/A         3/6/2025    Information    services provided? Yes   Language Other   Other Slovenian   Type of interpretation provided Video    name Allison damonul    Agency [employer]     HPI          ED/outpt OB follow up 3/3/2025    -diagnosed with empirically with BV and started on antibiotic, frustrated because the problem was back pain and vaginal pain and doesn't feel the antibiotic is helping,   -had one episode of blood from vagina before 3/3    Back pain: feels it began a month ago  -entire lower back, started gradually  -walking exacerbates and laying down on back worsens, prolonged standing worsens the pain  -previous pregnancies didn't have this pain    Pain in the vagina:  Feels like a electric shock sensations every couple of minutes  -does have white discharge (one episode of blood) denies sores/lesions/blisters    Does have bladder pain, can't tell if bladder/uterine/vaginal but feels like a deeper pain, denies labor like contractions    Notices constipation, it's uncomfortable              Objective    /69   Pulse 94   Temp 98.3  F (36.8  C) (Oral)   Resp 20   Ht 1.575 m (5' 2\")   Wt 63.5 kg (140 lb)   LMP 08/27/2024 (Approximate)   SpO2 96%   BMI 25.61 kg/m    Body mass index is 25.61 kg/m .  Physical Exam   GENERAL: alert and appears mildly uncomfortable, sometimes rubbing back/shifting position  RESP: lungs clear to auscultation - no rales, rhonchi or wheezes  CV: regular rate and rhythm, normal S1 S2, no S3 or S4, no murmur, click or rub, no peripheral edema  Back: no CVA tenderness but tender diffusely in lower back  ABDOMEN: soft, mild diffuse tenderness, no guarding, without hepatosplenomegaly or masses, bowel sounds normal, and gravid uterus, fundus above umbilicus " consistent with 29 weeks,     Placed on monitor: no visible contractions   (female): normal female external genitalia, normal urethral meatus , and normal vaginal mucosa, sterile glove, Cervix finger tip in external os, internal os not palpable  MS: no gross musculoskeletal defects noted, no edema    Results for orders placed or performed in visit on 03/06/25 (from the past 24 hours)   Wet preparation    Specimen: Vagina; Swab   Result Value Ref Range    Trichomonas Absent Absent    Yeast Absent Absent    Clue Cells Present (A) Absent    WBCs/high power field 1+ (A) None    Narrative    No odor, No PH, More than 20% Clue cells, Moderate bacteria   UA with Microscopic   Result Value Ref Range    Color Urine Yellow Colorless, Straw, Light Yellow, Yellow    Appearance Urine Clear Clear    Glucose Urine Negative Negative mg/dL    Bilirubin Urine Negative Negative    Ketones Urine Negative Negative mg/dL    Specific Gravity Urine >=1.030 1.003 - 1.035    Blood Urine Negative Negative    pH Urine 5.5 5.0 - 7.0    Protein Albumin Urine Negative Negative mg/dL    Urobilinogen Urine 0.2 0.2, 1.0 E.U./dL    Nitrite Urine Negative Negative    Leukocyte Esterase Urine Negative Negative   Urine Microscopic Exam   Result Value Ref Range    Bacteria Urine Moderate (A) None Seen /HPF    RBC Urine 0-2 0-2 /HPF /HPF    WBC Urine 0-5 0-5 /HPF /HPF    Squamous Epithelials Urine Few (A) None Seen /LPF    Mucus Urine Present (A) None Seen /LPF           Signed Electronically by: Deisi Calloway MD

## 2025-03-06 NOTE — TELEPHONE ENCOUNTER
Used fv line and lvm, Per Dr. Linda patient needs to come in for ob care, 1 hr glucose test with some other lab.  Dr. Linda would like to see patient  this week or early next week.    When patient return call, please help sechdule her and I will also try again later or next day if I am busy today,

## 2025-03-10 ENCOUNTER — OFFICE VISIT (OUTPATIENT)
Dept: FAMILY MEDICINE | Facility: CLINIC | Age: 25
End: 2025-03-10
Payer: COMMERCIAL

## 2025-03-10 VITALS
HEIGHT: 62 IN | BODY MASS INDEX: 25.76 KG/M2 | WEIGHT: 140 LBS | HEART RATE: 90 BPM | RESPIRATION RATE: 18 BRPM | OXYGEN SATURATION: 99 % | DIASTOLIC BLOOD PRESSURE: 67 MMHG | TEMPERATURE: 98.2 F | SYSTOLIC BLOOD PRESSURE: 110 MMHG

## 2025-03-10 DIAGNOSIS — R10.9 FLANK PAIN: ICD-10-CM

## 2025-03-10 DIAGNOSIS — O09.93 HIGH-RISK PREGNANCY, THIRD TRIMESTER: Primary | ICD-10-CM

## 2025-03-10 DIAGNOSIS — O26.13 POOR WEIGHT GAIN OF PREGNANCY, THIRD TRIMESTER: ICD-10-CM

## 2025-03-10 DIAGNOSIS — O09.32 INSUFFICIENT PRENATAL CARE IN SECOND TRIMESTER: ICD-10-CM

## 2025-03-10 DIAGNOSIS — O99.019 ANTEPARTUM ANEMIA: ICD-10-CM

## 2025-03-10 DIAGNOSIS — R39.89 BLADDER PAIN: ICD-10-CM

## 2025-03-10 DIAGNOSIS — R10.2 VAGINAL PAIN: ICD-10-CM

## 2025-03-10 RX ORDER — FERROUS SULFATE 325(65) MG
325 TABLET ORAL
Qty: 90 TABLET | Refills: 0 | Status: SHIPPED | OUTPATIENT
Start: 2025-03-10

## 2025-03-10 NOTE — PROGRESS NOTES
"Cathleen Urbano is a 24 year old  female who presents to clinic for a follow up OB visit. She is currently 30w1d with an estimated date of delivery of May 17, 2025 via 12w5d. She denies headache, chest pain, shortness of breath, abdominal pain/contractions, vaginal bleeding, or abnormal discharge. She has felt baby move.     Follow-up:   Back/vaginal/abdominal pain  Last seen by Dr. Calloway 3/6/25 -- constipation?  -Lidocaine cream  -UA/UC neg, BMP wnl  -Abdominal US pending   -NST reactive     New concerns today:   \"Doing well\"  Back pain getting better - Has been using creams.   Constipation better - normalized by self. Has not taken medicines.     20d after Ramadan.     OB History    Para Term  AB Living   4 3 3 0 0 3   SAB IAB Ectopic Multiple Live Births   0 0 0 0 3      # Outcome Date GA Lbr Adrian/2nd Weight Sex Type Anes PTL Lv   4 Current            3 Term 10/01/23 40w0d 02:32 / 00:03 3.61 kg (7 lb 15.3 oz) M Vag-Spont EPI N CHAPARRITA      Name: STEPHANY,MALE-CATHLEEN      Apgar1: 9  Apgar5: 9   2 Term 22   2.22 kg (4 lb 14.3 oz) F Vag-Spont   CHAPARRITA   1 Term 21    F Vag-Spont   CHAPARRITA      Obstetric Comments   Report that their first daughter's birth documents do not reflect her real birthday, but are unsure of her actual date of birth.  They state it is close to 21.       They also report that both daughters were born full term, but do not recall the actual gestation, and report that birth weight was low- Mariusz pulled up a photo of their second daughter on the scale at her birth, and it was 2.22kg.            Physical exam:  LMP 2024 (Approximate)     General: alert, female in no acute distress  Abdomen: gravid uterus appropriate for gestation age at 25 cm above pubic symphysis, non-tender, FHTs 147  Extremities: no edema    Wt Readings from Last 12 Encounters:   03/10/25 63.5 kg (140 lb)   25 63.5 kg (140 lb)   24 59.4 kg (131 lb)   24 59.4 kg (131 lb)   24 " 59.4 kg (131 lb)   24 60.3 kg (133 lb)   10/18/24 59.4 kg (131 lb)   10/04/24 59.9 kg (132 lb)   09/10/24 63 kg (139 lb)   23 56.2 kg (124 lb)   23 58.5 kg (129 lb)   10/02/23 62.1 kg (136 lb 12.8 oz)         Assessment/Plan:  Patient Active Problem List   Diagnosis    Supervision of high risk pregnancy in second trimester    Iron deficiency anemia, unspecified iron deficiency anemia type    Limited prenatal care in second trimester    Language barrier    Vitamin D deficiency    Vaginal discharge during pregnancy in third trimester    Encounter for triage in pregnant patient    Labor and delivery indication for care or intervention       Cathleen Urbano is a 24 year old  female at 30w2d here for routine OB visit. Prenatal course complicated by iron def anemia, poor weight gain.     (O09.93) High-risk pregnancy, third trimester  (primary encounter diagnosis  (O09.32) Insufficient prenatal care in second trimester  (O26.13) Poor weight gain of pregnancy, third trimester  - Defer TDAP, CBC, 1h GTT, syphilis - Ramadan.   - Weight gain and fundal heights - low. Measuring at 26cm today. Worried about growth and weight gain d/t fasting during Ramadan as well. Plan for repeat Growth US.   - Patient hoping to formula feed.   - Discussed post-partum contraception options.  - Discussed when to call/come in.  If you notice a decrease in your baby's movement. As a rule, if she perceives fewer than 10 movements in two hours at rest  If your begin to experience contractions that are 5 minutes or less apart and lasting for over 45 seconds, or if contractions are becoming more painful.  If you have any bleeding or leakage of fluids.   If you have a headache not resolved with tylenol, right upper abdominal pain, or sudden onset of swelling.    (O99.019) Antepartum anemia  Plan: ferrous sulfate (FEROSUL) 325 (65 Fe) MG         tablet, US OB > 14 Weeks    (R10.2) Vaginal pain  (R39.89) Bladder pain  (R10.9) Flank  pain  Improved w/sx management.    Follow-up in 2wks.     Honoremily Linda MD  [facility] Resident      Precepted with: Deisi Calloway MD

## 2025-03-11 NOTE — PROGRESS NOTES
Preceptor Attestation:  Patient's case reviewed and discussed with Jorje Linda MD resident and I evaluated the patient. I agree with written assessment and plan of care.  Supervising Physician:  KI SIMMONS MD  [facility]

## 2025-03-20 ENCOUNTER — ANCILLARY PROCEDURE (OUTPATIENT)
Dept: ULTRASOUND IMAGING | Facility: CLINIC | Age: 25
End: 2025-03-20
Attending: FAMILY MEDICINE
Payer: COMMERCIAL

## 2025-03-20 DIAGNOSIS — R10.9 FLANK PAIN: ICD-10-CM

## 2025-03-20 DIAGNOSIS — R10.84 ABDOMINAL PAIN, GENERALIZED: ICD-10-CM

## 2025-03-20 PROCEDURE — 76700 US EXAM ABDOM COMPLETE: CPT | Mod: TC | Performed by: RADIOLOGY

## 2025-03-20 NOTE — NURSING NOTE
Due to patient being non-English speaking/uses sign language, an  was used for this visit. Only for face-to-face interpretation by an external agency, date and length of interpretation can be found on the scanned worksheet.     name: #[id number]  Agency:  [employer]  Language:  Naida    Telephone number: [phone]  Type of interpretation: Telephone, spoken

## 2025-04-08 ENCOUNTER — TELEPHONE (OUTPATIENT)
Dept: FAMILY MEDICINE | Facility: CLINIC | Age: 25
End: 2025-04-08
Payer: COMMERCIAL

## 2025-04-08 DIAGNOSIS — O99.019 ANTEPARTUM ANEMIA: ICD-10-CM

## 2025-04-08 DIAGNOSIS — O09.93 HIGH-RISK PREGNANCY, THIRD TRIMESTER: Primary | ICD-10-CM

## 2025-04-08 NOTE — TELEPHONE ENCOUNTER
I called and spoke to . Reschedule patient due to she missed a couple of appointment. Ultrasound and lab only was schedule this Thursday       This happened yesterday around noon. I got busy and did not documented it yesterday

## 2025-04-09 ENCOUNTER — TELEPHONE (OUTPATIENT)
Dept: FAMILY MEDICINE | Facility: CLINIC | Age: 25
End: 2025-04-09
Payer: COMMERCIAL

## 2025-04-09 NOTE — TELEPHONE ENCOUNTER
Called and spoke to . Called to remind that Cathleen has an appointment with us tomorrow.  advise he will inform patient to come in tomorrow at 10 30

## 2025-04-10 ENCOUNTER — LAB (OUTPATIENT)
Dept: LAB | Facility: CLINIC | Age: 25
End: 2025-04-10
Payer: COMMERCIAL

## 2025-04-10 ENCOUNTER — ANCILLARY PROCEDURE (OUTPATIENT)
Dept: ULTRASOUND IMAGING | Facility: CLINIC | Age: 25
End: 2025-04-10
Attending: FAMILY MEDICINE
Payer: COMMERCIAL

## 2025-04-10 DIAGNOSIS — O09.93 HIGH-RISK PREGNANCY, THIRD TRIMESTER: ICD-10-CM

## 2025-04-10 DIAGNOSIS — O26.13 POOR WEIGHT GAIN OF PREGNANCY, THIRD TRIMESTER: ICD-10-CM

## 2025-04-10 DIAGNOSIS — O99.019 ANTEPARTUM ANEMIA: ICD-10-CM

## 2025-04-10 LAB
ERYTHROCYTE [DISTWIDTH] IN BLOOD BY AUTOMATED COUNT: 12.9 % (ref 10–15)
FERRITIN SERPL-MCNC: 18 NG/ML (ref 6–175)
GLUCOSE 1H P 50 G GLC PO SERPL-MCNC: 138 MG/DL (ref 70–129)
HCT VFR BLD AUTO: 33.5 % (ref 35–47)
HGB BLD-MCNC: 11 G/DL (ref 11.7–15.7)
MCH RBC QN AUTO: 30.6 PG (ref 26.5–33)
MCHC RBC AUTO-ENTMCNC: 32.8 G/DL (ref 31.5–36.5)
MCV RBC AUTO: 93 FL (ref 78–100)
PLATELET # BLD AUTO: 193 10E3/UL (ref 150–450)
RBC # BLD AUTO: 3.6 10E6/UL (ref 3.8–5.2)
WBC # BLD AUTO: 9.8 10E3/UL (ref 4–11)

## 2025-04-10 PROCEDURE — 76816 OB US FOLLOW-UP PER FETUS: CPT | Mod: TC | Performed by: RADIOLOGY

## 2025-04-10 NOTE — NURSING NOTE
Due to patient being non-English speaking/uses sign language, an  was used for this visit. Only for face-to-face interpretation by an external agency, date and length of interpretation can be found on the scanned worksheet.     name: Lynn #[id number]  Agency:  [employer]  Language:  Naida    Telephone number: [phone]  Type of interpretation: Telephone, spoken

## 2025-04-16 ENCOUNTER — TELEPHONE (OUTPATIENT)
Dept: FAMILY MEDICINE | Facility: CLINIC | Age: 25
End: 2025-04-16
Payer: COMMERCIAL

## 2025-04-16 NOTE — TELEPHONE ENCOUNTER
Per MD, called to switched appointments to 4/18/2025 due to PCP being available. Will need to move Lab visit to 4/18/25 as well.

## 2025-05-01 NOTE — PROGRESS NOTES
Cathleen Urbano is a 25 year old  female who presents to clinic for a follow up OB visit. She is currently 37w6d with an estimated date of delivery of May 17, 2025 via 12w5d US. She denies headache, chest pain, shortness of breath, abdominal pain/contractions, vaginal bleeding, or abnormal discharge. She has felt baby move.     New concerns today:   Questions about L&D hospital     Follow-up on:  -GBS neg, trepenema neg  -Passed 3h GTT    OB History    Para Term  AB Living   4 3 3 0 0 3   SAB IAB Ectopic Multiple Live Births   0 0 0 0 3      # Outcome Date GA Lbr Adrian/2nd Weight Sex Type Anes PTL Lv   4 Current            3 Term 10/01/23 40w0d 02:32 / 00:03 3.61 kg (7 lb 15.3 oz) M Vag-Spont EPI N CHAPARRITA      Name: STEPHANYMALE-CATHLEEN      Apgar1: 9  Apgar5: 9   2 Term 22   2.22 kg (4 lb 14.3 oz) F Vag-Spont   CHAPARRITA   1 Term 21    F Vag-Spont   CHAPARRITA      Obstetric Comments   Report that their first daughter's birth documents do not reflect her real birthday, but are unsure of her actual date of birth.  They state it is close to 21.       They also report that both daughters were born full term, but do not recall the actual gestation, and report that birth weight was low- Mariusz pulled up a photo of their second daughter on the scale at her birth, and it was 2.22kg.            Physical exam:  LMP 2024 (Approximate)     General: alert, female in no acute distress  Abdomen: gravid uterus, non-tender, FHTs 135, vertex by BSUS.   Gyn:  - 50/0, minimal discharge  Extremities: no edema    Assessment/Plan:  Cathleen Urbano is a 25 year old  female at 37w5d here for routine OB visit. Prenatal course complicated by GILBERTO. OB history - uncomplicated.     Routine prenatal care   - Weight gain wnl  - Fetal position vertex by US  - GBS NEG. Will NOT need penicillin in labor.   - Reviewed signs/symptoms of labor and when to present to the hospital.   If you notice a decrease in your baby's  movement. As a rule, if she perceives fewer than 10 movements in two hours at rest  If your begin to experience contractions that are 5 minutes or less apart and lasting for over 45 seconds, or if contractions are becoming more painful.  If you have any bleeding or leakage of fluids.   If you have a headache not resolved with tylenol, right upper abdominal pain, or sudden onset of swelling.  Follow up in 1 week for routine prenatal visit, sooner if labor symptoms.     ECV @ 37 wks for breech  Review birth plan  Discuss elective IOL @41 wks  Consider biweekly NST/BPP if post-dates    Honoree MD Alexei     Precepted with: Priscilla Lanza MD    Post Partum   PP contraception: Nexplanon   Hx PPD/Depression/Anxiety: Yes   Plans for post partum recovery/time off: Yes, stay-at-home parent    Feeding preference: formula   Feeding history: formula   Car seat: needs to buy     Baby  Peds provider:  [facility]   Baby Meds: All 3 meds okay.   Siblings required phototherapy: No

## 2025-05-02 ENCOUNTER — OFFICE VISIT (OUTPATIENT)
Dept: FAMILY MEDICINE | Facility: CLINIC | Age: 25
End: 2025-05-02
Payer: COMMERCIAL

## 2025-05-02 VITALS
OXYGEN SATURATION: 97 % | DIASTOLIC BLOOD PRESSURE: 64 MMHG | WEIGHT: 146 LBS | RESPIRATION RATE: 18 BRPM | BODY MASS INDEX: 26.87 KG/M2 | HEIGHT: 62 IN | TEMPERATURE: 97.7 F | SYSTOLIC BLOOD PRESSURE: 108 MMHG

## 2025-05-02 DIAGNOSIS — O09.93 HIGH-RISK PREGNANCY, THIRD TRIMESTER: Primary | ICD-10-CM

## 2025-05-02 DIAGNOSIS — Z12.4 CERVICAL CANCER SCREENING: ICD-10-CM

## 2025-05-02 DIAGNOSIS — O09.33 INSUFFICIENT PRENATAL CARE, THIRD TRIMESTER: ICD-10-CM

## 2025-05-02 ASSESSMENT — PATIENT HEALTH QUESTIONNAIRE - PHQ9: SUM OF ALL RESPONSES TO PHQ QUESTIONS 1-9: 7

## 2025-05-02 NOTE — PATIENT INSTRUCTIONS
"Baby Development   Consider signing up for text messages to learn about how you and your baby are developing during pregnancy: [email or web address]://[email or web address].org    Fetal Movement    Consider downloading the\"Count The Kicks\" durga    [email or web address]://counttheWellDoc.[email or web address]      Preparing for the hospital  You may be feeling anxious as your child s birth approaches. This is normal. To give yourself some peace of mind, pack a bag 3-4 weeks before your due date. Here is a list of things to remember:  Personal care items like toothbrush, hair brush, lip balm, lotion, shampoo, glasses, contacts  Nightgown, bathrobe, slippers  Several pairs of underwear  Comfortable clothes for you to wear home  Cell phone and   Insurance information and any other paperwork needed for your hospital stay  Clothes for baby to wear home  An infant, rear-facing car seat for bringing home your baby (this is required by law)    [facility] Information  If you have any further concerns or wish to schedule another appointment, please call our office at [phone] during normal business hours (8-5, M-F).     You will begin weekly visits at 36 weeks until you deliver.     If you have urgent medical questions that cannot wait, you may call [phone] at any time of day.     If you have a medical emergency, please call [phone].    When to call or come in to the hospital  If you notice a decrease in your baby's movement.   If your begin to experience contractions that are 5 minutes or less apart and lasting for over 45 seconds, or if contractions are becoming more painful.  If you have any bleeding or leakage of fluids.   If you have a headache not resolved with tylenol, right upper abdominal pain, or sudden onset of swelling.  You know your body best. Never hesitate to call or go to the hospital if something doesn't feel right!  [facility]  Address: [address] [place] [address]  Phone: [phone]   "

## 2025-05-02 NOTE — PROGRESS NOTES
Preceptor Attestation:   Patient seen, evaluated and discussed with the resident. I have verified the content of the note, which accurately reflects my assessment of the patient and the plan of care.  Supervising Physician:Priscilla Lanza MD  [facility]

## 2025-05-12 ENCOUNTER — TELEPHONE (OUTPATIENT)
Dept: FAMILY MEDICINE | Facility: CLINIC | Age: 25
End: 2025-05-12

## 2025-05-15 ENCOUNTER — TELEPHONE (OUTPATIENT)
Dept: FAMILY MEDICINE | Facility: CLINIC | Age: 25
End: 2025-05-15
Payer: COMMERCIAL

## 2025-05-15 NOTE — TELEPHONE ENCOUNTER
"  General Call      Reason for Call:  Patient spouse Mariusz Urbano called requesting for the address and hospital where patient will need to go for L&D, I let him know she can either go to [facility] or [facility]. Spouse questioned why these hospital addresses were not given to patient so she's ready for it, informed him looks like per encounter with Rachell 5/12/25, she had let him know same information, but I can provide information to them as well. Spouse stated he is bringing her wherever he wants for her to deliver baby and \"provider is not a doctor since no information was given to patient about where she needs to go to deliver baby.\" I apologized if there were any miscommunication, and can provide the hospital name and address for both, spouse upset and disconnected call.     Routing for FYI. Thanks  "

## 2025-05-18 ENCOUNTER — HOSPITAL ENCOUNTER (INPATIENT)
Facility: HOSPITAL | Age: 25
LOS: 1 days | Discharge: HOME | End: 2025-05-19
Attending: FAMILY MEDICINE | Admitting: FAMILY MEDICINE
Payer: COMMERCIAL

## 2025-05-18 DIAGNOSIS — Z3A.40 40 WEEKS GESTATION OF PREGNANCY: Primary | ICD-10-CM

## 2025-05-18 DIAGNOSIS — O09.92 SUPERVISION OF HIGH RISK PREGNANCY IN SECOND TRIMESTER: ICD-10-CM

## 2025-05-18 LAB
ABO + RH BLD: NORMAL
BLD GP AB SCN SERPL QL: NEGATIVE
ERYTHROCYTE [DISTWIDTH] IN BLOOD BY AUTOMATED COUNT: 12.8 % (ref 10–15)
HCT VFR BLD AUTO: 34 % (ref 35–47)
HGB BLD-MCNC: 11.3 G/DL (ref 11.7–15.7)
MCH RBC QN AUTO: 29.7 PG (ref 26.5–33)
MCHC RBC AUTO-ENTMCNC: 33.2 G/DL (ref 31.5–36.5)
MCV RBC AUTO: 89 FL (ref 78–100)
PLATELET # BLD AUTO: 163 10E3/UL (ref 150–450)
RBC # BLD AUTO: 3.81 10E6/UL (ref 3.8–5.2)
SPECIMEN EXP DATE BLD: NORMAL
T PALLIDUM AB SER QL: NONREACTIVE
WBC # BLD AUTO: 12.7 10E3/UL (ref 4–11)

## 2025-05-18 PROCEDURE — 36415 COLL VENOUS BLD VENIPUNCTURE: CPT

## 2025-05-18 PROCEDURE — 250N000011 HC RX IP 250 OP 636

## 2025-05-18 PROCEDURE — 86850 RBC ANTIBODY SCREEN: CPT

## 2025-05-18 PROCEDURE — 250N000009 HC RX 250

## 2025-05-18 PROCEDURE — 250N000013 HC RX MED GY IP 250 OP 250 PS 637: Performed by: OBSTETRICS & GYNECOLOGY

## 2025-05-18 PROCEDURE — 99221 1ST HOSP IP/OBS SF/LOW 40: CPT | Mod: AI

## 2025-05-18 PROCEDURE — 722N000001 HC LABOR CARE VAGINAL DELIVERY SINGLE

## 2025-05-18 PROCEDURE — 85027 COMPLETE CBC AUTOMATED: CPT

## 2025-05-18 PROCEDURE — 120N000001 HC R&B MED SURG/OB

## 2025-05-18 PROCEDURE — 86780 TREPONEMA PALLIDUM: CPT

## 2025-05-18 RX ORDER — OXYTOCIN/0.9 % SODIUM CHLORIDE 30/500 ML
340 PLASTIC BAG, INJECTION (ML) INTRAVENOUS CONTINUOUS PRN
Status: DISCONTINUED | OUTPATIENT
Start: 2025-05-18 | End: 2025-05-19 | Stop reason: HOSPADM

## 2025-05-18 RX ORDER — METOCLOPRAMIDE HYDROCHLORIDE 5 MG/ML
10 INJECTION INTRAMUSCULAR; INTRAVENOUS EVERY 6 HOURS PRN
Status: DISCONTINUED | OUTPATIENT
Start: 2025-05-18 | End: 2025-05-18 | Stop reason: HOSPADM

## 2025-05-18 RX ORDER — CARBOPROST TROMETHAMINE 250 UG/ML
250 INJECTION, SOLUTION INTRAMUSCULAR
Status: DISCONTINUED | OUTPATIENT
Start: 2025-05-18 | End: 2025-05-18 | Stop reason: HOSPADM

## 2025-05-18 RX ORDER — METHYLERGONOVINE MALEATE 0.2 MG/ML
200 INJECTION INTRAVENOUS
Status: DISCONTINUED | OUTPATIENT
Start: 2025-05-18 | End: 2025-05-18 | Stop reason: HOSPADM

## 2025-05-18 RX ORDER — NALOXONE HYDROCHLORIDE 0.4 MG/ML
0.2 INJECTION, SOLUTION INTRAMUSCULAR; INTRAVENOUS; SUBCUTANEOUS
Status: DISCONTINUED | OUTPATIENT
Start: 2025-05-18 | End: 2025-05-19 | Stop reason: HOSPADM

## 2025-05-18 RX ORDER — LOPERAMIDE HYDROCHLORIDE 2 MG/1
2 CAPSULE ORAL
Status: DISCONTINUED | OUTPATIENT
Start: 2025-05-18 | End: 2025-05-19 | Stop reason: HOSPADM

## 2025-05-18 RX ORDER — IBUPROFEN 800 MG/1
800 TABLET, FILM COATED ORAL
Status: COMPLETED | OUTPATIENT
Start: 2025-05-18 | End: 2025-05-18

## 2025-05-18 RX ORDER — TRANEXAMIC ACID 10 MG/ML
1 INJECTION, SOLUTION INTRAVENOUS EVERY 30 MIN PRN
Status: DISCONTINUED | OUTPATIENT
Start: 2025-05-18 | End: 2025-05-19 | Stop reason: HOSPADM

## 2025-05-18 RX ORDER — LOPERAMIDE HYDROCHLORIDE 2 MG/1
4 CAPSULE ORAL
Status: DISCONTINUED | OUTPATIENT
Start: 2025-05-18 | End: 2025-05-19 | Stop reason: HOSPADM

## 2025-05-18 RX ORDER — ONDANSETRON 4 MG/1
4 TABLET, ORALLY DISINTEGRATING ORAL EVERY 6 HOURS PRN
Status: DISCONTINUED | OUTPATIENT
Start: 2025-05-18 | End: 2025-05-18 | Stop reason: HOSPADM

## 2025-05-18 RX ORDER — PROCHLORPERAZINE MALEATE 10 MG
10 TABLET ORAL EVERY 6 HOURS PRN
Status: DISCONTINUED | OUTPATIENT
Start: 2025-05-18 | End: 2025-05-18 | Stop reason: HOSPADM

## 2025-05-18 RX ORDER — OXYTOCIN 10 [USP'U]/ML
10 INJECTION, SOLUTION INTRAMUSCULAR; INTRAVENOUS
Status: DISCONTINUED | OUTPATIENT
Start: 2025-05-18 | End: 2025-05-19 | Stop reason: HOSPADM

## 2025-05-18 RX ORDER — METHYLERGONOVINE MALEATE 0.2 MG/ML
200 INJECTION INTRAVENOUS
Status: DISCONTINUED | OUTPATIENT
Start: 2025-05-18 | End: 2025-05-19 | Stop reason: HOSPADM

## 2025-05-18 RX ORDER — CARBOPROST TROMETHAMINE 250 UG/ML
250 INJECTION, SOLUTION INTRAMUSCULAR
Status: DISCONTINUED | OUTPATIENT
Start: 2025-05-18 | End: 2025-05-19 | Stop reason: HOSPADM

## 2025-05-18 RX ORDER — KETOROLAC TROMETHAMINE 15 MG/ML
15 INJECTION, SOLUTION INTRAMUSCULAR; INTRAVENOUS
Status: COMPLETED | OUTPATIENT
Start: 2025-05-18 | End: 2025-05-18

## 2025-05-18 RX ORDER — HYDROCORTISONE 25 MG/G
CREAM TOPICAL 3 TIMES DAILY PRN
Status: DISCONTINUED | OUTPATIENT
Start: 2025-05-18 | End: 2025-05-19 | Stop reason: HOSPADM

## 2025-05-18 RX ORDER — MISOPROSTOL 200 UG/1
400 TABLET ORAL
Status: DISCONTINUED | OUTPATIENT
Start: 2025-05-18 | End: 2025-05-19 | Stop reason: HOSPADM

## 2025-05-18 RX ORDER — IBUPROFEN 800 MG/1
800 TABLET, FILM COATED ORAL EVERY 6 HOURS PRN
Status: DISCONTINUED | OUTPATIENT
Start: 2025-05-18 | End: 2025-05-19 | Stop reason: HOSPADM

## 2025-05-18 RX ORDER — OXYTOCIN 10 [USP'U]/ML
10 INJECTION, SOLUTION INTRAMUSCULAR; INTRAVENOUS
Status: DISCONTINUED | OUTPATIENT
Start: 2025-05-18 | End: 2025-05-18 | Stop reason: HOSPADM

## 2025-05-18 RX ORDER — CITRIC ACID/SODIUM CITRATE 334-500MG
30 SOLUTION, ORAL ORAL
Status: DISCONTINUED | OUTPATIENT
Start: 2025-05-18 | End: 2025-05-18 | Stop reason: HOSPADM

## 2025-05-18 RX ORDER — AMOXICILLIN 250 MG
2 CAPSULE ORAL
Status: DISCONTINUED | OUTPATIENT
Start: 2025-05-18 | End: 2025-05-19 | Stop reason: HOSPADM

## 2025-05-18 RX ORDER — FENTANYL CITRATE 50 UG/ML
50 INJECTION, SOLUTION INTRAMUSCULAR; INTRAVENOUS EVERY 30 MIN PRN
Refills: 0 | Status: DISCONTINUED | OUTPATIENT
Start: 2025-05-18 | End: 2025-05-18 | Stop reason: HOSPADM

## 2025-05-18 RX ORDER — LIDOCAINE 40 MG/G
CREAM TOPICAL
Status: DISCONTINUED | OUTPATIENT
Start: 2025-05-18 | End: 2025-05-19 | Stop reason: HOSPADM

## 2025-05-18 RX ORDER — TRANEXAMIC ACID 10 MG/ML
1 INJECTION, SOLUTION INTRAVENOUS EVERY 30 MIN PRN
Status: DISCONTINUED | OUTPATIENT
Start: 2025-05-18 | End: 2025-05-18 | Stop reason: HOSPADM

## 2025-05-18 RX ORDER — METOCLOPRAMIDE 10 MG/1
10 TABLET ORAL EVERY 6 HOURS PRN
Status: DISCONTINUED | OUTPATIENT
Start: 2025-05-18 | End: 2025-05-18 | Stop reason: HOSPADM

## 2025-05-18 RX ORDER — OXYTOCIN/0.9 % SODIUM CHLORIDE 30/500 ML
100-340 PLASTIC BAG, INJECTION (ML) INTRAVENOUS CONTINUOUS PRN
Status: DISCONTINUED | OUTPATIENT
Start: 2025-05-18 | End: 2025-05-19 | Stop reason: HOSPADM

## 2025-05-18 RX ORDER — NALOXONE HYDROCHLORIDE 0.4 MG/ML
0.4 INJECTION, SOLUTION INTRAMUSCULAR; INTRAVENOUS; SUBCUTANEOUS
Status: DISCONTINUED | OUTPATIENT
Start: 2025-05-18 | End: 2025-05-19 | Stop reason: HOSPADM

## 2025-05-18 RX ORDER — MISOPROSTOL 200 UG/1
400 TABLET ORAL
Status: DISCONTINUED | OUTPATIENT
Start: 2025-05-18 | End: 2025-05-18 | Stop reason: HOSPADM

## 2025-05-18 RX ORDER — LOPERAMIDE HYDROCHLORIDE 2 MG/1
4 CAPSULE ORAL
Status: DISCONTINUED | OUTPATIENT
Start: 2025-05-18 | End: 2025-05-18 | Stop reason: HOSPADM

## 2025-05-18 RX ORDER — OXYTOCIN/0.9 % SODIUM CHLORIDE 30/500 ML
340 PLASTIC BAG, INJECTION (ML) INTRAVENOUS CONTINUOUS PRN
Status: DISCONTINUED | OUTPATIENT
Start: 2025-05-18 | End: 2025-05-18 | Stop reason: HOSPADM

## 2025-05-18 RX ORDER — TERBUTALINE SULFATE 1 MG/ML
0.25 INJECTION SUBCUTANEOUS
Status: DISCONTINUED | OUTPATIENT
Start: 2025-05-18 | End: 2025-05-18 | Stop reason: HOSPADM

## 2025-05-18 RX ORDER — MISOPROSTOL 200 UG/1
800 TABLET ORAL
Status: DISCONTINUED | OUTPATIENT
Start: 2025-05-18 | End: 2025-05-19 | Stop reason: HOSPADM

## 2025-05-18 RX ORDER — POLYETHYLENE GLYCOL 3350 17 G/17G
17 POWDER, FOR SOLUTION ORAL DAILY PRN
Status: DISCONTINUED | OUTPATIENT
Start: 2025-05-18 | End: 2025-05-19 | Stop reason: HOSPADM

## 2025-05-18 RX ORDER — LOPERAMIDE HYDROCHLORIDE 2 MG/1
2 CAPSULE ORAL
Status: DISCONTINUED | OUTPATIENT
Start: 2025-05-18 | End: 2025-05-18 | Stop reason: HOSPADM

## 2025-05-18 RX ORDER — ACETAMINOPHEN 325 MG/1
650 TABLET ORAL EVERY 4 HOURS PRN
Status: DISCONTINUED | OUTPATIENT
Start: 2025-05-18 | End: 2025-05-19 | Stop reason: HOSPADM

## 2025-05-18 RX ORDER — MISOPROSTOL 200 UG/1
800 TABLET ORAL
Status: DISCONTINUED | OUTPATIENT
Start: 2025-05-18 | End: 2025-05-18 | Stop reason: HOSPADM

## 2025-05-18 RX ORDER — ONDANSETRON 2 MG/ML
4 INJECTION INTRAMUSCULAR; INTRAVENOUS EVERY 6 HOURS PRN
Status: DISCONTINUED | OUTPATIENT
Start: 2025-05-18 | End: 2025-05-18 | Stop reason: HOSPADM

## 2025-05-18 RX ORDER — BISACODYL 10 MG
10 SUPPOSITORY, RECTAL RECTAL DAILY PRN
Status: DISCONTINUED | OUTPATIENT
Start: 2025-05-18 | End: 2025-05-19 | Stop reason: HOSPADM

## 2025-05-18 RX ADMIN — ACETAMINOPHEN 650 MG: 325 TABLET ORAL at 22:27

## 2025-05-18 RX ADMIN — IBUPROFEN 800 MG: 800 TABLET ORAL at 15:12

## 2025-05-18 RX ADMIN — ACETAMINOPHEN 650 MG: 325 TABLET ORAL at 17:34

## 2025-05-18 RX ADMIN — METHYLERGONOVINE MALEATE 200 MCG: 0.2 INJECTION INTRAVENOUS at 04:57

## 2025-05-18 RX ADMIN — ACETAMINOPHEN 650 MG: 325 TABLET ORAL at 07:06

## 2025-05-18 RX ADMIN — KETOROLAC TROMETHAMINE 15 MG: 15 INJECTION, SOLUTION INTRAMUSCULAR; INTRAVENOUS at 05:18

## 2025-05-18 RX ADMIN — Medication 340 ML/HR: at 04:57

## 2025-05-18 RX ADMIN — IBUPROFEN 800 MG: 800 TABLET ORAL at 22:27

## 2025-05-18 ASSESSMENT — ACTIVITIES OF DAILY LIVING (ADL)
ADLS_ACUITY_SCORE: 16
ADLS_ACUITY_SCORE: 22
ADLS_ACUITY_SCORE: 42
ADLS_ACUITY_SCORE: 23
ADLS_ACUITY_SCORE: 42
ADLS_ACUITY_SCORE: 23
ADLS_ACUITY_SCORE: 42
ADLS_ACUITY_SCORE: 22
ADLS_ACUITY_SCORE: 23
ADLS_ACUITY_SCORE: 22
ADLS_ACUITY_SCORE: 16
ADLS_ACUITY_SCORE: 23

## 2025-05-18 NOTE — PROGRESS NOTES
Patient declines all male providers for delivery. Patient is open to male providers for baby and for herself after delivery as long as they do not have to perform assessments on patient. Requests female provider for physical assessments.    Patient would also prefer female  in-person or on iPad. If unavailable patient consents to male .

## 2025-05-18 NOTE — L&D DELIVERY NOTE
I was called to the room for delivery of this patient who declined a male provider.  At arrival, the baby was already delivered by the nurse.  I delivered the placenta and examined the vagina and perineum and she did not have any lacerations.    Latonia Mlulen MD on 5/18/2025 at 5:01 AM

## 2025-05-18 NOTE — H&P
[facility]    History and Physical  Obstetrics and Gynecology     Date of Admission:  2025    Assessment & Plan   Cathleen Urbano is a 25 year old  at 40w1d who presents with rupture of membranes and contractions. Patient allowing only female providers in the room. Initial SVE by nursing found to be 2 cm / 50% / -2 with normal fetal tracing and regular contractions. While discussing plan with nursing to get female provider to the hospital, patient had precipitous delivery. In house OB, Dr. Mullen, was notified and came to patient's room. See Dr. Mullen's delivery note for additional details.     I did not physically see or speak to the patient as she declined any male provider. A female provider may be available to round on the patient today.     ASSESSMENT:   IUP @ 40w1d in labor.  FHT  Category  I    PLAN:   Admit - see IP orders      History of Present Illness   Cathleen Urbano is a 25 year old female  at 40w1d by 12w5d US is [facility] in early [facility].    PRENATAL COURSE  Prenatal course was complicated by GILBERTO. OB history uncomplicated  Patient Active Problem List    Diagnosis Date Noted    40 weeks gestation of pregnancy 2025     Priority: Medium    Encounter for triage in pregnant patient 10/01/2023     Priority: Medium    Labor and delivery indication for care or intervention 10/01/2023     Priority: Medium    Vaginal discharge during pregnancy in third trimester 2023     Priority: Medium    Vitamin D deficiency 2023     Priority: Medium     23: Recommended supplementation       Language barrier 2023     Priority: Medium     Communicates with Pushto        Supervision of high risk pregnancy in second trimester 2023     Priority: Medium     *moved from country- [place]*  *Dating from  *  [facility] Patient Provider Group choice: CNM group  Partner's name: Mariusz  [x]NOB folder  [x]Dating-   us, JERILYN completed  [] 1st trimester screening:n/a  []quad/afp: n/a  [x]Fetal anatomy US ordered- MFM referral level 2 us placed 6/21  [x]Rubella immune  [x]Hep B immune  []Pap- need to ask, plan pp if needed  [x] No added risk for PRE-E  [x] No increased risk for GDM  []No need for utox in labor  []COVID vaccine completed  _____________________________________  []EOB folder  []PP Contraception plan: If tubal,consent date:  []Labor plans: epidural  []:  [x]Infant feeding plan breast  []FLU shot  [x]TDAP given  [x]Rhogam if needed, NA  [x]TOLAC NA  [x] Water birth interest NO  [x]GCT, passed  ________________________________________  [] OTC PP meds sent  []PP plans, time off, support system discussed, resources offered  []Planning CS-ERAS pkt        Iron deficiency anemia, unspecified iron deficiency anemia type 06/22/2023     Priority: Medium     6/5: hbg 9.9, iron sent      6/21: vit c and b12 sent   Repeat with EOB labs, future order in place  7/7/23: Hgb 10.6 at EOB. Recommend continuing with supplementation or given persistent anemia in pregnancy could consider IV Fe infusions if patient prefers. Message sent to RN team.       Limited prenatal care in second trimester 06/22/2023     Priority: Medium     Had some prenatal care in [place] prior to moving    6/21: JERILYN completed,  to assist           Prenatal labs notable for:low hemoglobin  Blood type B+  GBS negative   Rubella immune  HIV negative  Hepatitis B, syphilis non-reactive    Past Medical History    I have reviewed this patient's medical history and updated it with pertinent information if needed.   Past Medical History:   Diagnosis Date    No pertinent past medical history        Past Surgical History   I have reviewed this patient's surgical history and updated it with pertinent information if needed.  No past surgical history on file.    Prior to Admission Medications   Prior to Admission Medications   Prescriptions Last Dose Informant  Patient Reported? Taking?   Prenatal Vit-Fe Fumarate-FA (PRENATAL MULTIVITAMIN W/IRON) 27-0.8 MG tablet   No No   Sig: Take 1 tablet by mouth daily.   Prenatal Vit-Fe Fumarate-FA (PRENATAL MULTIVITAMIN W/IRON) 27-0.8 MG tablet   No No   Sig: Take 1 tablet by mouth daily.   acetaminophen (TYLENOL) 325 MG tablet   Yes No   Sig: Take 2 Tablets by mouth every 6 hours if needed for Pain. **Max acetaminophen dose: 4000mg in 24 hOUrs.*   aspirin 81 MG EC tablet   No No   Sig: Take 1 tablet (81 mg) by mouth daily.   ferrous sulfate (FEROSUL) 325 (65 Fe) MG tablet   No No   Sig: Take 1 tablet (325 mg) by mouth daily (with breakfast).   hydrOXYzine gerald (VISTARIL) 25 MG capsule   Yes No   Sig: Take 1 capsule by mouth at bedtime.   lidocaine (LMX4) 4 % external cream   No No   Sig: Apply topically to vagina AM and PM as needed for pain   magnesium hydroxide (MILK OF MAGNESIA) 400 MG/5ML suspension   No No   Sig: Take 15 mLs by mouth daily as needed for constipation or heartburn.   metroNIDAZOLE (FLAGYL) 500 MG tablet   Yes No   Sig: Take 500 mg by mouth 2 times daily.   pyridOXINE (VITAMIN B6) 25 MG tablet   No No   Sig: Take 1 tablet (25 mg) by mouth daily.      Facility-Administered Medications: None     Allergies   No Known Allergies    Social History   I have reviewed this patient's social history and updated it with pertinent information if needed. Cathleen Urbano  reports that she has never smoked. She has never been exposed to tobacco smoke. She has never used smokeless tobacco. She reports that she does not drink alcohol and does not use drugs.    Family History   I have reviewed this patient's family history and updated it with pertinent information if needed.   Family History   Problem Relation Age of Onset    No Known Problems Mother     No Known Problems Father     No Known Problems Brother     No Known Problems Brother     No Known Problems Brother     No Known Problems Brother     No Known Problems Brother     No  Known Problems Brother     No Known Problems Brother     No Known Problems Brother     No Known Problems Brother     No Known Problems Maternal Grandmother     No Known Problems Maternal Grandfather     No Known Problems Paternal Grandmother     No Known Problems Paternal Grandfather     No Known Problems Sister        Immunization History   TDAP given this pregnancy.  Influenza vaccine not given this pregnancy.    Physical Exam   Temp: 98.5  F (36.9  C) Temp src: Oral BP: 124/70     Resp: 16 SpO2: 100 %      Vital Signs with Ranges  Temp:  [98.5  F (36.9  C)] 98.5  F (36.9  C)  Resp:  [16] 16  BP: (124-130)/(70-82) 124/70  SpO2:  [100 %] 100 %    Patient declining male provider, so I did not perform a physical evaluation of the patient    Fetal assessment:              Heart Rate baseline: 130              Variability: moderate    Accelerations: present   Decelerations: absent              FHR Category: Category 1  Cervical exam by RN:              Dilation: 2 cm              Effacement: 50%              Station: -2   Uterine activity:              Contraction frequency: every 2-3 minutes              Contraction duration: 60 seconds    Ricardo Elam MD, PGY-1  [facility]

## 2025-05-18 NOTE — PLAN OF CARE
Goal Outcome Evaluation:      Plan of Care Reviewed With: patient    Overall Patient Progress: improvingOverall Patient Progress: improving    Outcome Evaluation:  @ 0450. VSS, bleeding is light, firm @ U. Declines bringing infant to breast, requests supplementation with formula. Bladder scanned for 130 mL @ 0705.

## 2025-05-18 NOTE — PROVIDER NOTIFICATION
Data: Cathleen wheeler 25 year old  40w1d presented to [facility]: 2025  3:50 AM. Patient admitted for Active Labor. Prenatal record reviewed. Pregnancy has been uncomplicated..    Gestational Age 40w1d. VSS. Fetal movement present. Patient denies uterine contractions, leaking of vaginal fluid/rupture of membranes. Support person is present.    Action: Verbal consent for EFM.  Admission assessment completed. Bill of rights reviewed.    Response: Patient verbalized agreement with plan.   [employer] or [employer]  used for cares.      0413: Dr. Elam notified of SVE and patient status. Notified patient refuses male providers. Provider will enter intrapartum orders.    0441: Primary RN talking with resident in unit about pain management for patient, requesting IV fentanyl. Discussing plan for female provider for delivery if permitted. Call female [facility] for delivery if female provider from [facility] is unavailable.

## 2025-05-18 NOTE — PROGRESS NOTES
Patient's mother in law notified nurses patient was feeling urge to push. Dr. Mullen called to attend delivery

## 2025-05-18 NOTE — PROGRESS NOTES
D:  Patient admitted to [facility]/MMWT81-1 via wheelchair with .  A:  Bedside report received from Dalia JO RN. Oriented patient and family to surroundings; call light within reach. 4 Part ID bands double checked with reporting RN.  R:  Patient and  tolerated transfer. Care assumed.

## 2025-05-18 NOTE — PROGRESS NOTES
Maternal Postpartum Progress Note  [facility]  Date of Service: 2025    Name      Cathleen SMITH       2000  MRN       [id number]  PCP        Jorje Linda        Subjective:  The patient is doing well. No concerns about pain or significant bleeding. Plans to formula feed. Questions answered.     Objective:  /73   Temp 98.7  F (37.1  C) (Oral)   Resp 16   LMP 2024 (Approximate)   SpO2 99%   Breastfeeding Unknown   Alert and NAD. Resting comfortably in bed. No increased work of breathing. Appears well perfused. Mood and affect appropriate.     Labs:  Hemoglobin   Date Value Ref Range Status   2025 11.3 (L) 11.7 - 15.7 g/dL Final       Assessment:    -Postpartum Day 0 s/p precipitous vaginal delivery  -Normal postpartum course thus far      Plan:    -Continue current care    Completed by:   Gentry Neves M.D.- PGY3  [facility]   2025 7:22 AM

## 2025-05-18 NOTE — PROGRESS NOTES
Data: Cathleen Urbano transferred to [facility]18/MLDT81-4 via wheelchair. Patient transferred to Postpartum with .    Action: Receiving unit notified of transfer. Patient and support person notified of room change. Patient and belongings accompanied by Registered Nurse. Bedside report given to Camila ACEVEDO RN. Fundal check performed with receiving RN. Oriented patient to surroundings. Call light within reach.    Response: Patient tolerated transfer.    Dalia Cadet RN  2025 10:42 AM

## 2025-05-18 NOTE — PLAN OF CARE
Goal Outcome Evaluation:      Plan of Care Reviewed With: patient    Overall Patient Progress: improvingOverall Patient Progress: improving     Problem: Adult Inpatient Plan of Care  Goal: Plan of Care Review  2025 by Lucy Patino, RN  Outcome: Progressing  Flowsheets (Taken 2025)  Plan of Care Reviewed With: patient  Overall Patient Progress: improving     Problem: Postpartum (Vaginal Delivery)  Goal: Optimal Pain Control and Function  2025 by Lucy Patino, RN  Outcome: Progressing     Problem: Postpartum (Vaginal Delivery)  Goal: Effective Urinary Elimination  2025 by Lucy Patino, RN  Outcome: Progressing     Vital signs stable. Fundus firm. Lochia WDL.   Passed voiding trials. Ambulating in room independently.   Reports mild cramping pain. Pain managed with Ibuprofen and Tylenol.  Formula feeding  every 2-3 hours.   Reviewed plan of care with patient.

## 2025-05-19 VITALS
TEMPERATURE: 97.8 F | RESPIRATION RATE: 16 BRPM | HEART RATE: 64 BPM | DIASTOLIC BLOOD PRESSURE: 66 MMHG | SYSTOLIC BLOOD PRESSURE: 111 MMHG | OXYGEN SATURATION: 99 %

## 2025-05-19 PROCEDURE — 250N000013 HC RX MED GY IP 250 OP 250 PS 637: Performed by: OBSTETRICS & GYNECOLOGY

## 2025-05-19 PROCEDURE — 99238 HOSP IP/OBS DSCHRG MGMT 30/<: CPT | Mod: GC

## 2025-05-19 RX ORDER — ACETAMINOPHEN 325 MG/1
650 TABLET ORAL EVERY 4 HOURS PRN
Qty: 60 TABLET | Refills: 0 | Status: SHIPPED | OUTPATIENT
Start: 2025-05-19

## 2025-05-19 RX ORDER — PNV NO.95/FERROUS FUM/FOLIC AC 28MG-0.8MG
1 TABLET ORAL DAILY
Qty: 60 TABLET | Refills: 0 | Status: SHIPPED | OUTPATIENT
Start: 2025-05-19

## 2025-05-19 RX ORDER — IBUPROFEN 800 MG/1
800 TABLET, FILM COATED ORAL EVERY 6 HOURS PRN
Qty: 30 TABLET | Refills: 0 | Status: SHIPPED | OUTPATIENT
Start: 2025-05-19

## 2025-05-19 RX ADMIN — ACETAMINOPHEN 650 MG: 325 TABLET ORAL at 09:38

## 2025-05-19 RX ADMIN — METHYLCELLULOSE 1000 MG: 500 TABLET ORAL at 09:35

## 2025-05-19 ASSESSMENT — ACTIVITIES OF DAILY LIVING (ADL)
ADLS_ACUITY_SCORE: 22

## 2025-05-19 NOTE — DISCHARGE SUMMARY
OB Discharge Summary  Date:  2025    Name:  Cathleen Urbano  :  2000  MRN:  [id number]    Admission Date:  2025  Delivery Date:  2025  4:50 AM  Gestational Age at Delivery:  40w1d  Discharge Date:      Principal Diagnosis:    Problem List Items Addressed This Visit       Supervision of high risk pregnancy in second trimester    Relevant Orders    Breast pump - Manual/Electric    40 weeks gestation of pregnancy - Primary    Relevant Orders    Breast pump - Manual/Electric     Other Diagnosis:  GILBERTO    Conditions complicating Pregnancy: GILBERTO, limited 3rd trimester cares    Procedure(s) Performed:       Condition at Discharge:  good    Summary of Hospitalization:  Cathleen Urbano is a 25 year old  female at 40w1d who presented to [facility] on 25 with SROM. Prenatal course was complicated by GILBERTO and insufficient cares in 3rd trimester. Patient delivered precipitously. Dr. Mullen came to deliver placenta and assess for lacerations. No perineal lacerations. No PPH. Patient recovering well postpartum.     On the day of discharge:   Patient reports that she is feeling well. Pain is well-controlled with tylenol. The amount and color of the lochia is appropriate for the duration of recovery; patient denies clots. Tolerating a normal diet. Urinating and stooling normally. No emotional concerns. Patient states that formula feeding is going well. Bonding well with infant.     OB History    Para Term  AB Living   4 4 4 0 0 4   SAB IAB Ectopic Multiple Live Births   0 0 0 0 4      # Outcome Date GA Lbr Adrian/2nd Weight Sex Type Anes PTL Lv   4 Term 25 40w1d / 00:02 3.26 kg (7 lb 3 oz) F Vag-Spont None N CHAPARRITA      Name: Female-Cathleen Urbano      Apgar1: 9  Apgar5: 9   3 Term 10/01/23 40w0d 02:32 / 00:03 3.61 kg (7 lb 15.3 oz) M Vag-Spont EPI N CHAPARRITA      Name: STEPHANY,MALE-CATHLEEN      Apgar1: 9  Apgar5: 9   2 Term 22   2.22 kg (4 lb 14.3 oz) F Vag-Spont   CHAPARRITA   1 Term  01/01/21    F Vag-Spont   CHAPARRITA      Obstetric Comments   Report that their first daughter's birth documents do not reflect her real birthday, but are unsure of her actual date of birth.  They state it is close to 01/01/21.       They also report that both daughters were born full term, but do not recall the actual gestation, and report that birth weight was low- Mariusz pulled up a photo of their second daughter on the scale at her birth, and it was 2.22kg.            Examination:  /66 (BP Location: Right arm)   Pulse 64   Temp 97.8  F (36.6  C) (Oral)   Resp 16   LMP 08/27/2024 (Approximate)   SpO2 99%   Breastfeeding Unknown    Gen: alert, normal interactions and behaviors  Cor: RRR, no murmurs/rubs/gallops  Lungs: CTAB, no wheezes or crackles   ABD: Fundus firm below umbilicus, active bowel sounds  Ext: No lower extremity edema, 2+ peripheral pulses    Discharge Medications:      Review of your medicines        START taking        Dose / Directions   ibuprofen 800 MG tablet  Commonly known as: ADVIL/MOTRIN  Used for: Labor and delivery indication for care or intervention      Dose: 800 mg  Take 1 tablet (800 mg) by mouth every 6 hours as needed for other (first line or per patient preference for mild to moderate pain management).  Quantity: 30 tablet  Refills: 0            CONTINUE these medicines which may have CHANGED, or have new prescriptions. If we are uncertain of the size of tablets/capsules you have at home, strength may be listed as something that might have changed.        Dose / Directions   acetaminophen 325 MG tablet  Commonly known as: TYLENOL  This may have changed: See the new instructions.  Used for: Labor and delivery indication for care or intervention      Dose: 650 mg  Take 2 tablets (650 mg) by mouth every 4 hours as needed for fever or other (second line or per patient preference for mild to moderate pain management).  Quantity: 60 tablet  Refills: 0     Prenatal Vitamin 27-0.8 MG  Tabs  This may have changed: Another medication with the same name was removed. Continue taking this medication, and follow the directions you see here.  Used for: Labor and delivery indication for care or intervention      Dose: 1 tablet  Take 1 tablet by mouth daily.  Quantity: 60 tablet  Refills: 0            CONTINUE these medicines which have NOT CHANGED        Dose / Directions   ferrous sulfate 325 (65 Fe) MG tablet  Commonly known as: FEROSUL  Used for: Antepartum anemia      Dose: 325 mg  Take 1 tablet (325 mg) by mouth daily (with breakfast).  Quantity: 90 tablet  Refills: 0            STOP taking      aspirin 81 MG EC tablet        hydrOXYzine gerald 25 MG capsule  Commonly known as: VISTARIL        lidocaine 4 % external cream  Commonly known as: LMX4        magnesium hydroxide 400 MG/5ML suspension  Commonly known as: MILK OF MAGNESIA        metroNIDAZOLE 500 MG tablet  Commonly known as: FLAGYL        pyridOXINE 25 MG tablet  Commonly known as: VITAMIN B6                  Where to get your medicines        These medications were sent to [id number] - [place], [place] - [address], [place] [address]      Phone: [phone]   acetaminophen 325 MG tablet  ibuprofen 800 MG tablet  Prenatal Vitamin 27-0.8 MG Tabs           Discharge Plan:   Follow up with Dr. Linda in 2 weeks   Contraception Plan: Nexplanon   Patient Instructions:     Physical activity: normal   Diet: normal   Medication: tylenol, ibuprofen as needed     Jorje Linda MD PGY2  [facility]       Precepted patient with Dr. Milady Stewart.

## 2025-05-19 NOTE — PLAN OF CARE
Problem: Postpartum (Vaginal Delivery)  Goal: Optimal Pain Control and Function  5/19/2025 0408 by Kellie Patiño, RN  Outcome: Progressing  5/18/2025 2344 by Kellie Patiño, RN  Outcome: Progressing           Goal Outcome Evaluation:      Plan of Care Reviewed With: patient    Overall Patient Progress: improving    Outcome Evaluation: Patients VSS, voiding, ambulating, reporting no pain        Kellie Patiño, RN on 5/19/2025 at 4:09 AM

## 2025-05-19 NOTE — DISCHARGE INSTRUCTIONS
Warning Signs after Having a Baby    Keep this paper on your fridge or somewhere else where you can see it.    Call your provider if you have any of these symptoms up to 12 weeks after having your baby.    Thoughts of hurting yourself or your baby  Pain in your chest or trouble breathing  Severe headache not helped by pain medicine  Eyesight concerns (blurry vision, seeing spots or flashes of light, other changes to eyesight)  Fainting, shaking or other signs of a seizure    Call 9-1-1 if you feel that it is an emergency.     The symptoms below can happen to anyone after giving birth. They can be very serious. Call your provider if you have any of these warning signs.    My provider s phone number: _______________________    Losing too much blood (hemorrhage)    Call your provider if you soak through a pad in less than an hour or pass blood clots bigger than a golf ball. These may be signs that you are bleeding too much.    Blood clots in the legs or lungs    After you give birth, your body naturally clots its blood to help prevent blood loss. Sometimes this increased clotting can happen in other areas of the body, like the legs or lungs. This can block your blood flow and be very dangerous.     Call your provider if you:  Have a red, swollen spot on the back of your leg that is warm or painful when you touch it.   Are coughing up blood.     Infection    Call your provider if you have any of these symptoms:  Fever of 100.4 F (38 C) or higher.  Pain or redness around your stitches if you had an incision.   Any yellow, white, or green fluid coming from places where you had stitches or surgery.    Mood Problems (postpartum depression)    Many people feel sad or have mood changes after having a baby. But for some people, these mood swings are worse.     Call your provider right away if you feel so anxious or nervous that you can't care for yourself or your baby.    Preeclampsia (high blood pressure)    Even if you  didn't have high blood pressure when you were pregnant, you are at risk for the high blood pressure disease called preeclampsia. This risk can last up to 12 weeks after giving birth.     Call your provider if you have:   Pain on your right side under your rib cage  Sudden swelling in the hands and face    Remember: You know your body. If something doesn't feel right, get medical help.     For informational purposes only. Not to replace the advice of your health care provider. Copyright 2020 [facility]. All rights reserved. Clinically reviewed by Anjelica Castillo, RNC-OB, MSN. [employer] [id number] - Rev 02/23.

## 2025-05-19 NOTE — PLAN OF CARE
Goal Outcome Evaluation:    Discharge instructions complete. Patient verbalized understanding, questions answered. Walked to car in wheelchair by nursing aid.     D:  Patient desires discharge home.  Discharge orders received and entered by provider.  A:  Discharge instructions reviewed with the patient.  All questions and concerns addressed.  R:  Discharge criteria met.  4 Part ID bands double checked.  [place] discharged in car seat with parents.  The nursing assistant escorted patient to car .

## 2025-06-30 ENCOUNTER — OFFICE VISIT (OUTPATIENT)
Dept: FAMILY MEDICINE | Facility: CLINIC | Age: 25
End: 2025-06-30
Payer: COMMERCIAL

## 2025-06-30 VITALS
HEART RATE: 72 BPM | OXYGEN SATURATION: 99 % | DIASTOLIC BLOOD PRESSURE: 74 MMHG | RESPIRATION RATE: 22 BRPM | SYSTOLIC BLOOD PRESSURE: 109 MMHG | TEMPERATURE: 97.9 F

## 2025-06-30 DIAGNOSIS — Z30.017 INSERTION OF IMPLANTABLE SUBDERMAL CONTRACEPTIVE: Primary | ICD-10-CM

## 2025-06-30 PROCEDURE — 3074F SYST BP LT 130 MM HG: CPT

## 2025-06-30 PROCEDURE — 11981 INSERTION DRUG DLVR IMPLANT: CPT | Mod: GC

## 2025-06-30 PROCEDURE — 3078F DIAST BP <80 MM HG: CPT

## 2025-06-30 PROCEDURE — 99207 PR DROP WITH A PROCEDURE: CPT

## 2025-06-30 NOTE — PROGRESS NOTES
Procedure Note-Nexplanon Insertion    Cathleen Urbano is a patient of Dr. Linda, [place] here for placement of etonogestrel implant (Nexplanon)    Indication:Contraception    Consent: Affirmation of informed consent was signed and scanned into the medical record. Risks, benefits and alternatives were discussed. Patient's questions were elicited and answered.   Procedure safety checklist was completed:  Yes  Time Out (Pause for the Cause) completed: Yes    Labs: UPT:  Patient declined. Started LMP 6/29/2025.   Previous Contraception:  none  Counseling:  Pt. counseled on potential side effects of  Nexplanon, including unpredictable spotting/bleeding and plan for removal/replacement of Nexplanon in 3 years or less.    Preoperative Diagnosis: Desires effective contraception  Postoperative Diagnosis: etonogestrel implant in place  Skin prep Betadine  Anesthesia 1% lidocaine, with epi    Technique:   Patient was placed supine with right arm exposed.  Yayo was made 8-10cm above medial epicondial and a guiding yayo 4 cm above the first.  Arm was prepped with betadine.Insertion point was anesthetized with 1% lidocaine 2mL. After stretching the skin with thumb and index finger around the insertion site.  Skin punctured with the tip of the needle inserted at 30 degrees and then lowered to horizontal position. While lifting the skin with the tip of the needle, slided the needle to it's full length. Applicator was then stabilized and the purple slider was unlocked by pushing it slightly down. Slider moved back completely until it stopped. Applicator was then removed.  Correct placement of the implant was confirmed by palpation in the patient's arm and visualizing the purple top of the obturator.  Insertion site was dressed with an adhesive covered by a pressure dressing.      User card and patient chart label filled out. User card  given to patient for record. Nexplanon added to medication list       EBL: minimal  Complications:   No  Tolerance: Pt tolerated procedure well and was in stable condition.     Of note, lidocaine w/epi was opened for 39d and used rather than discarding after 30d. Patient informed and discussed return precautions, including but not limited to swelling, redness, fevers/chills, pain, and exudates.    Follow up: Pt was instructed to call if bleeding, severe pain or foul smell.     Follow up in 2-4 weeks.  Resident: Jorje Linda MD  Faculty: Dorcas Nix MD present for and supervised this entire procedure.

## 2025-07-03 ENCOUNTER — RESULTS FOLLOW-UP (OUTPATIENT)
Dept: FAMILY MEDICINE | Facility: CLINIC | Age: 25
End: 2025-07-03

## 2025-07-07 NOTE — PROGRESS NOTES
Preceptor Attestation:  I was present for and supervised entirety of uncomplicated nexplanon insertion as documented by resident physician.  Please see note for details.  Supervising Physician:  Dorcas Nix MD  [facility]

## 2025-07-22 ENCOUNTER — APPOINTMENT (OUTPATIENT)
Dept: RADIOLOGY | Facility: HOSPITAL | Age: 25
End: 2025-07-22
Attending: EMERGENCY MEDICINE
Payer: COMMERCIAL

## 2025-07-22 ENCOUNTER — HOSPITAL ENCOUNTER (EMERGENCY)
Facility: HOSPITAL | Age: 25
Discharge: HOME | End: 2025-07-23
Attending: EMERGENCY MEDICINE
Payer: COMMERCIAL

## 2025-07-22 DIAGNOSIS — R55 SYNCOPE, UNSPECIFIED SYNCOPE TYPE: ICD-10-CM

## 2025-07-22 DIAGNOSIS — D64.9 ANEMIA, UNSPECIFIED TYPE: ICD-10-CM

## 2025-07-22 LAB
ALBUMIN UR-MCNC: NEGATIVE MG/DL
APPEARANCE UR: CLEAR
ATRIAL RATE - MUSE: 76 BPM
BASOPHILS # BLD AUTO: 0 10E3/UL (ref 0–0.2)
BASOPHILS NFR BLD AUTO: 1 %
BILIRUB UR QL STRIP: NEGATIVE
CO2 SERPL-SCNC: 23 MMOL/L (ref 22–29)
COLOR UR AUTO: ABNORMAL
D DIMER PPP FEU-MCNC: <0.27 UG/ML FEU (ref 0–0.5)
DIASTOLIC BLOOD PRESSURE - MUSE: 54 MMHG
EOSINOPHIL # BLD AUTO: 0.2 10E3/UL (ref 0–0.7)
EOSINOPHIL NFR BLD AUTO: 3 %
ERYTHROCYTE [DISTWIDTH] IN BLOOD BY AUTOMATED COUNT: 12.8 % (ref 10–15)
GLUCOSE UR STRIP-MCNC: NEGATIVE MG/DL
HCT VFR BLD AUTO: 33.5 % (ref 35–47)
HGB BLD-MCNC: 11.3 G/DL (ref 11.7–15.7)
HGB UR QL STRIP: NEGATIVE
IMM GRANULOCYTES # BLD: 0 10E3/UL
IMM GRANULOCYTES NFR BLD: 0 %
INTERPRETATION ECG - MUSE: NORMAL
KETONES UR STRIP-MCNC: NEGATIVE MG/DL
LEUKOCYTE ESTERASE UR QL STRIP: NEGATIVE
LYMPHOCYTES # BLD AUTO: 2.2 10E3/UL (ref 0.8–5.3)
LYMPHOCYTES NFR BLD AUTO: 34 %
MAGNESIUM SERPL-MCNC: 1.9 MG/DL (ref 1.7–2.3)
MCH RBC QN AUTO: 29.7 PG (ref 26.5–33)
MCHC RBC AUTO-ENTMCNC: 33.7 G/DL (ref 31.5–36.5)
MCV RBC AUTO: 88 FL (ref 78–100)
MONOCYTES # BLD AUTO: 0.5 10E3/UL (ref 0–1.3)
MONOCYTES NFR BLD AUTO: 8 %
MUCOUS THREADS #/AREA URNS LPF: PRESENT /LPF
NEUTROPHILS # BLD AUTO: 3.6 10E3/UL (ref 1.6–8.3)
NEUTROPHILS NFR BLD AUTO: 56 %
NITRATE UR QL: NEGATIVE
NRBC # BLD AUTO: 0 10E3/UL
NRBC BLD AUTO-RTO: 0 /100
P AXIS - MUSE: 42 DEGREES
PH UR STRIP: 6 [PH] (ref 5–7)
PLATELET # BLD AUTO: 236 10E3/UL (ref 150–450)
PR INTERVAL - MUSE: 114 MS
QRS DURATION - MUSE: 74 MS
QT - MUSE: 382 MS
QTC - MUSE: 429 MS
R AXIS - MUSE: 35 DEGREES
RBC # BLD AUTO: 3.8 10E6/UL (ref 3.8–5.2)
RBC URINE: 1 /HPF
SP GR UR STRIP: 1.03 (ref 1–1.03)
SQUAMOUS EPITHELIAL: <1 /HPF
SYSTOLIC BLOOD PRESSURE - MUSE: 98 MMHG
T AXIS - MUSE: 19 DEGREES
TROPONIN T SERPL HS-MCNC: 15 NG/L
TROPONIN T SERPL HS-MCNC: 8 NG/L
UROBILINOGEN UR STRIP-MCNC: NORMAL MG/DL
VENTRICULAR RATE- MUSE: 76 BPM
WBC # BLD AUTO: 6.5 10E3/UL (ref 4–11)
WBC URINE: 1 /HPF

## 2025-07-22 PROCEDURE — 71046 X-RAY EXAM CHEST 2 VIEWS: CPT

## 2025-07-22 PROCEDURE — 85025 COMPLETE CBC W/AUTO DIFF WBC: CPT | Performed by: EMERGENCY MEDICINE

## 2025-07-22 PROCEDURE — 85379 FIBRIN DEGRADATION QUANT: CPT | Performed by: EMERGENCY MEDICINE

## 2025-07-22 PROCEDURE — 84484 ASSAY OF TROPONIN QUANT: CPT | Performed by: EMERGENCY MEDICINE

## 2025-07-22 PROCEDURE — 96360 HYDRATION IV INFUSION INIT: CPT

## 2025-07-22 PROCEDURE — 81003 URINALYSIS AUTO W/O SCOPE: CPT | Performed by: EMERGENCY MEDICINE

## 2025-07-22 PROCEDURE — 82374 ASSAY BLOOD CARBON DIOXIDE: CPT | Performed by: EMERGENCY MEDICINE

## 2025-07-22 PROCEDURE — 83735 ASSAY OF MAGNESIUM: CPT | Performed by: EMERGENCY MEDICINE

## 2025-07-22 PROCEDURE — 99285 EMERGENCY DEPT VISIT HI MDM: CPT | Mod: 25 | Performed by: EMERGENCY MEDICINE

## 2025-07-22 PROCEDURE — 36415 COLL VENOUS BLD VENIPUNCTURE: CPT | Performed by: EMERGENCY MEDICINE

## 2025-07-22 PROCEDURE — 93005 ELECTROCARDIOGRAM TRACING: CPT | Performed by: EMERGENCY MEDICINE

## 2025-07-22 PROCEDURE — 258N000003 HC RX IP 258 OP 636: Performed by: EMERGENCY MEDICINE

## 2025-07-22 RX ADMIN — SODIUM CHLORIDE 1000 ML: 0.9 INJECTION, SOLUTION INTRAVENOUS at 20:36

## 2025-07-22 ASSESSMENT — ACTIVITIES OF DAILY LIVING (ADL)
ADLS_ACUITY_SCORE: 48

## 2025-07-22 ASSESSMENT — COLUMBIA-SUICIDE SEVERITY RATING SCALE - C-SSRS
2. HAVE YOU ACTUALLY HAD ANY THOUGHTS OF KILLING YOURSELF IN THE PAST MONTH?: NO
1. IN THE PAST MONTH, HAVE YOU WISHED YOU WERE DEAD OR WISHED YOU COULD GO TO SLEEP AND NOT WAKE UP?: NO
6. HAVE YOU EVER DONE ANYTHING, STARTED TO DO ANYTHING, OR PREPARED TO DO ANYTHING TO END YOUR LIFE?: NO

## 2025-07-22 NOTE — ED TRIAGE NOTES
BIBA there is a language barrier. States is dizzy. Placed in bed out in triage. Blood sugar 99. Is not diabetic. Sister interpreting for pt. Denies visual changes. Denies si or HI. Has 2 month old at home. Is not breastfeeding. States room spinning dizziness and hearing sounds in ears

## 2025-07-23 VITALS
BODY MASS INDEX: 24.14 KG/M2 | DIASTOLIC BLOOD PRESSURE: 62 MMHG | HEART RATE: 75 BPM | SYSTOLIC BLOOD PRESSURE: 117 MMHG | WEIGHT: 132 LBS | TEMPERATURE: 98.8 F | RESPIRATION RATE: 18 BRPM | OXYGEN SATURATION: 100 %

## 2025-07-23 RX ORDER — FERROUS SULFATE 325(65) MG
325 TABLET ORAL
Qty: 30 TABLET | Refills: 0 | Status: SHIPPED | OUTPATIENT
Start: 2025-07-23

## 2025-07-23 NOTE — ED NOTES
"Sister summoned writer via EDT to speak to  of pt on the phone.  asking what was happening with the pt. Stating \"No one has even seen her and she has been there over an hour.\" Explained that pt had been triaged by a registered nurse and was stable at the time of triage. Blood pressure, pulse, resps, oxygen, temp and blood sugar within normal limits. Apologized for wait times.  then stated \"I will be there in a few minutes and I will hanlde this.\" Updated security.   "

## 2025-07-23 NOTE — ED NOTES
Bed: JNED-22  Expected date:   Expected time:   Means of arrival:   Comments:  ED WHEN CLEAN Cathleen S

## 2025-07-23 NOTE — ED PROVIDER NOTES
EMERGENCY DEPARTMENT ENCOUNTER      NAME: Cathleen Urbano  AGE: 25 year old female  YOB: 2000  MRN: [id number]  EVALUATION DATE & TIME: 7/22/2025  7:38 PM    PCP: Jorje Linda    ED PROVIDER: Krys Edwards MD      Chief Complaint   Patient presents with    Dizziness         FINAL IMPRESSION:  1. Anemia, unspecified type    2. Syncope, unspecified syncope type          ED COURSE & MEDICAL DECISION MAKING:    Pertinent Labs & Imaging studies reviewed. (See chart for details)  25 year old female presents to the Emergency Department for evaluation of an episode of syncope and chest pain.  Patient had been seen in clinic and when she returned home, she had a syncopal event at the front door.  She felt dizzy and lightheaded and had pain in the left side of her chest.  She reports that she had eaten very little today.  She has had similar episodes previously.  Her  reports that she had a history of low blood counts and was on medication but after she gave birth they did not continue these medications.  Per chart review, the patient with a history of iron deficiency anemia and previously was on ferrous sulfate 325 mg daily.  She reports that a week ago she had a 17-day menstrual cycle where she was bleeding heavily, soaking 3-4 pads daily.  She has had not had any bleeding for the last week.  She otherwise denies any other pain.  Vital signs are stable.  Awaiting laboratory studies including D-dimer given her recent delivery and syncopal episode today.  Hemoglobin is stable for her.  If workup is unremarkable will discharge the patient home on iron supplements and recommend follow-up with primary care provider for ongoing workup.    Troponins with no significant change.  EKG was normal.  Hemoglobin is stable for the patient.  In reviewing her records she was previously on iron for iron deficiency anemia and I did represcribe this.  Vital signs remained stable.  She received a liter of fluids.  No  signs of infection.  Patient was discharged home with recommendations to follow-up closely with her primary care provider.    8:00 PM I met with the patient, obtained history, performed initial exam, and discussed plan for care.   9:59 PM Patient decline urine sample.  12:11 AM Discussed plan for discharge with patient.     At the conclusion of the encounter I discussed the results of all of the tests and the disposition. The questions were answered. The patient or family acknowledged understanding and was agreeable with the care plan.       Medical Decision Making  I obtained history from Family Member/Significant Other  Discharge. I prescribed additional prescription strength medication(s) as charted. See documentation for any additional details.    MIPS (CTPE, Dental pain, Jordan, Sinusitis, Asthma/COPD, Head Trauma): Not Applicable    SEPSIS: None          MIPS: Not Applicable        MEDICATIONS GIVEN IN THE EMERGENCY:  Medications   sodium chloride 0.9% BOLUS 1,000 mL (0 mLs Intravenous Stopped 7/22/25 9752)       NEW PRESCRIPTIONS STARTED AT TODAY'S ER VISIT  New Prescriptions    No medications on file          =================================================================    HPI    Patient information was obtained from: Patient, and patients sister in-law    Use of : Yes, patients sister (pierce;destiny)      Cathleen Urbano is a 25 year old female with a pertinent history of iron deficiency anemia, and vitamin D deficiency who presents to this ED for evaluation of dizziness.    Per patients sister in-law, patient went to the clinic today and when she came back home, she passed out at the front door around 5:00 PM (~3 hours ago). She reports dizziness, light headedness, and heart pain. She states that the heart pain began around 4:30 PM and it is on and off. She has had little to eat today.     Of note, patient has given birth around 2 months ago, and her last period ended one week ago. Her period  "lasted 17 days and would use 3-4 pads daily.    Patient denies nausea, diarrhea, or any other symptoms at this time.     Per patients , he reports that this is not the first time this has occurred. He states their primary doctor said that \"she doesn't have enough blood\" and prescribed her with medications that had improved her symptoms. After the patient had her baby, she was not prescribed those medications again.     Per chart review, patient visited [facility] on 06/27/2025 for routine postpartum follow-up, and evaluation of low hemoglobin.           PAST MEDICAL HISTORY:  Past Medical History:   Diagnosis Date    No pertinent past medical history        PAST SURGICAL HISTORY:  No past surgical history on file.        CURRENT MEDICATIONS:    acetaminophen (TYLENOL) 325 MG tablet  ferrous sulfate (FEROSUL) 325 (65 Fe) MG tablet  ibuprofen (ADVIL/MOTRIN) 800 MG tablet  Prenatal Vit-Fe Fumarate-FA (PRENATAL VITAMIN) 27-0.8 MG TABS        ALLERGIES:  No Known Allergies    FAMILY HISTORY:  Family History   Problem Relation Age of Onset    No Known Problems Mother     No Known Problems Father     No Known Problems Brother     No Known Problems Brother     No Known Problems Brother     No Known Problems Brother     No Known Problems Brother     No Known Problems Brother     No Known Problems Brother     No Known Problems Brother     No Known Problems Brother     No Known Problems Maternal Grandmother     No Known Problems Maternal Grandfather     No Known Problems Paternal Grandmother     No Known Problems Paternal Grandfather     No Known Problems Sister        SOCIAL HISTORY:   Social History     Socioeconomic History    Marital status:      Spouse name: Mariusz    Number of children: 2   Tobacco Use    Smoking status: Never     Passive exposure: Never    Smokeless tobacco: Never   Substance and Sexual Activity    Alcohol use: Never    Drug use: Never    Sexual activity: Yes     " Partners: Male   Social History Narrative    ** Merged History Encounter **          Social Drivers of Health     Interpersonal Safety: Low Risk  (3/6/2025)    Interpersonal Safety     Do you feel physically and emotionally safe where you currently live?: Yes     Within the past 12 months, have you been hit, slapped, kicked or otherwise physically hurt by someone?: No     Within the past 12 months, have you been humiliated or emotionally abused in other ways by your partner or ex-partner?: No       VITALS:  /62   Pulse 75   Temp 98.8  F (37.1  C) (Oral)   Resp 18   Wt 59.9 kg (132 lb)   LMP 08/26/2024 (Exact Date)   SpO2 100%   Breastfeeding No   BMI 24.14 kg/m      PHYSICAL EXAM    Constitutional: Well developed, Well nourished, NAD  HENT: Normocephalic, Atraumatic, Bilateral external ears normal, Oropharynx normal, mucous membranes moist, Nose normal.   Neck- Normal range of motion, No tenderness, Supple, No stridor.  Eyes: PERRL, EOMI, Conjunctiva normal, No discharge.   Respiratory: Normal breath sounds, No respiratory distress  Cardiovascular: Normal heart rate, Regular rhythm  GI: Bowel sounds normal, Soft, No tenderness,   Musculoskeletal: No edema. Good range of motion in all major joints. No tenderness to palpation or major deformities noted.   Integument: Warm, Dry, No erythema, No rash  Neurologic: Alert & oriented x 3, Normal motor function, Normal sensory function, No focal deficits noted. Normal gait.   Psychiatric: Affect normal, Judgment normal, Mood normal.      LAB:  All pertinent labs reviewed and interpreted.  Results for orders placed or performed during the hospital encounter of 07/22/25   Chest XR,  PA & LAT    Impression    IMPRESSION: Negative chest.   Co2 whole blood   Result Value Ref Range    Carbon Dioxide Whole Blood 23 22 - 29 mmol/L   D dimer quantitative   Result Value Ref Range    D-Dimer Quantitative <0.27 0.00 - 0.50 ug/mL FEU   Result Value Ref Range    Troponin T,  High Sensitivity 15 (H) <=14 ng/L   UA with Microscopic reflex to Culture    Specimen: Urine, Clean Catch   Result Value Ref Range    Color Urine Light Yellow Colorless, Straw, Light Yellow, Yellow    Appearance Urine Clear Clear    Glucose Urine Negative Negative mg/dL    Bilirubin Urine Negative Negative    Ketones Urine Negative Negative mg/dL    Specific Gravity Urine 1.026 1.001 - 1.030    Blood Urine Negative Negative    pH Urine 6.0 5.0 - 7.0    Protein Albumin Urine Negative Negative mg/dL    Urobilinogen Urine Normal Normal mg/dL    Nitrite Urine Negative Negative    Leukocyte Esterase Urine Negative Negative    Mucus Urine Present (A) None Seen /LPF    RBC Urine 1 <=2 /HPF    WBC Urine 1 <=5 /HPF    Squamous Epithelials Urine <1 <=1 /HPF   Result Value Ref Range    Magnesium 1.9 1.7 - 2.3 mg/dL   CBC with platelets and differential   Result Value Ref Range    WBC Count 6.5 4.0 - 11.0 10e3/uL    RBC Count 3.80 3.80 - 5.20 10e6/uL    Hemoglobin 11.3 (L) 11.7 - 15.7 g/dL    Hematocrit 33.5 (L) 35.0 - 47.0 %    MCV 88 78 - 100 fL    MCH 29.7 26.5 - 33.0 pg    MCHC 33.7 31.5 - 36.5 g/dL    RDW 12.8 10.0 - 15.0 %    Platelet Count 236 150 - 450 10e3/uL    % Neutrophils 56 %    % Lymphocytes 34 %    % Monocytes 8 %    % Eosinophils 3 %    % Basophils 1 %    % Immature Granulocytes 0 %    NRBCs per 100 WBC 0 <1 /100    Absolute Neutrophils 3.6 1.6 - 8.3 10e3/uL    Absolute Lymphocytes 2.2 0.8 - 5.3 10e3/uL    Absolute Monocytes 0.5 0.0 - 1.3 10e3/uL    Absolute Eosinophils 0.2 0.0 - 0.7 10e3/uL    Absolute Basophils 0.0 0.0 - 0.2 10e3/uL    Absolute Immature Granulocytes 0.0 <=0.4 10e3/uL    Absolute NRBCs 0.0 10e3/uL   Result Value Ref Range    Troponin T, High Sensitivity 8 <=14 ng/L   ECG 12-LEAD WITH MUSE (LHE)   Result Value Ref Range    Systolic Blood Pressure 98 mmHg    Diastolic Blood Pressure 54 mmHg    Ventricular Rate 76 BPM    Atrial Rate 76 BPM    KY Interval 114 ms    QRS Duration 74 ms     ms     QTc 429 ms    P Axis 42 degrees    R AXIS 35 degrees    T Axis 19 degrees    Interpretation ECG       Sinus rhythm  Normal ECG  No previous ECGs available  Confirmed by SEE ED PROVIDER NOTE FOR, ECG INTERPRETATION ([id number]),  CHELLE MCCOY ([id number]) on 7/22/2025 9:13:39 PM         RADIOLOGY:  Reviewed all pertinent imaging. Please see official radiology report.  Chest XR,  PA & LAT   Final Result   IMPRESSION: Negative chest.          EKG:    Performed at: 22-Jul-2025 20:47    Impression: Sinus rhythm. Normal ECG     Rate: 76 BPM  Rhythm: Sinus rhythm    LA Interval: 114 ms  QRS Interval: 74 ms   QTc Interval: 429 ms  ST Changes: No acute ischemia   Comparison: No previous ECGs available    I have independently reviewed and interpreted the EKG(s) documented above.          I, Chantell Mckenzie, am serving as a scribe to document services personally performed by Krys Edwards MD, based on my observation and the provider's statements to me. I, Krys Edwards MD, attest that Chantell Mckenzie is acting in a scribe capacity, has observed my performance of the services and has documented them in accordance with my direction.    Krys Edwards MD  Emergency Medicine  [facility] EMERGENCY DEPARTMENT  [facility] [facility] [id number] [phone]       Krys Edwards MD  07/23/25 0041